# Patient Record
Sex: FEMALE | Race: WHITE | Employment: FULL TIME | URBAN - METROPOLITAN AREA
[De-identification: names, ages, dates, MRNs, and addresses within clinical notes are randomized per-mention and may not be internally consistent; named-entity substitution may affect disease eponyms.]

---

## 2022-03-11 ENCOUNTER — TRANSCRIBE ORDER (OUTPATIENT)
Dept: REGISTRATION | Age: 52
End: 2022-03-11

## 2022-03-11 ENCOUNTER — HOSPITAL ENCOUNTER (OUTPATIENT)
Dept: PREADMISSION TESTING | Age: 52
Discharge: HOME | End: 2022-03-11
Payer: COMMERCIAL

## 2022-03-11 ENCOUNTER — HOSPITAL ENCOUNTER (OUTPATIENT)
Dept: GENERAL RADIOLOGY | Age: 52
Discharge: HOME | End: 2022-03-11
Payer: COMMERCIAL

## 2022-03-11 DIAGNOSIS — Z01.812 BLOOD TESTS PRIOR TO TREATMENT OR PROCEDURE: Primary | ICD-10-CM

## 2022-03-11 DIAGNOSIS — Z01.812 BLOOD TESTS PRIOR TO TREATMENT OR PROCEDURE: ICD-10-CM

## 2022-03-11 DIAGNOSIS — M16.11 PRIMARY OSTEOARTHRITIS OF RIGHT HIP: ICD-10-CM

## 2022-03-11 DIAGNOSIS — M16.11 PRIMARY OSTEOARTHRITIS OF RIGHT HIP: Primary | ICD-10-CM

## 2022-03-11 LAB
ANION GAP SERPL CALC-SCNC: 4 MMOL/L (ref 3–18)
APPEARANCE UR: CLEAR
APTT PPP: 26.9 SEC (ref 23–36.4)
ATRIAL RATE: 63 BPM
BASOPHILS # BLD: 0.1 K/UL (ref 0–0.1)
BASOPHILS NFR BLD: 1 % (ref 0–2)
BILIRUB UR QL: NEGATIVE
BUN SERPL-MCNC: 16 MG/DL (ref 7–18)
BUN/CREAT SERPL: 21 (ref 12–20)
CALCIUM SERPL-MCNC: 9.4 MG/DL (ref 8.5–10.1)
CALCULATED P AXIS, ECG09: 78 DEGREES
CALCULATED R AXIS, ECG10: 61 DEGREES
CALCULATED T AXIS, ECG11: 60 DEGREES
CHLORIDE SERPL-SCNC: 105 MMOL/L (ref 100–111)
CO2 SERPL-SCNC: 30 MMOL/L (ref 21–32)
COLOR UR: YELLOW
CREAT SERPL-MCNC: 0.75 MG/DL (ref 0.6–1.3)
DIAGNOSIS, 93000: NORMAL
DIFFERENTIAL METHOD BLD: ABNORMAL
EOSINOPHIL # BLD: 0.3 K/UL (ref 0–0.4)
EOSINOPHIL NFR BLD: 3 % (ref 0–5)
ERYTHROCYTE [DISTWIDTH] IN BLOOD BY AUTOMATED COUNT: 12.7 % (ref 11.6–14.5)
GLUCOSE SERPL-MCNC: 94 MG/DL (ref 74–99)
GLUCOSE UR STRIP.AUTO-MCNC: NEGATIVE MG/DL
HBA1C MFR BLD: 6 % (ref 4.2–5.6)
HCT VFR BLD AUTO: 43.6 % (ref 35–45)
HGB BLD-MCNC: 14.5 G/DL (ref 12–16)
HGB UR QL STRIP: NEGATIVE
IMM GRANULOCYTES # BLD AUTO: 0 K/UL (ref 0–0.04)
IMM GRANULOCYTES NFR BLD AUTO: 0 % (ref 0–0.5)
INR PPP: 1.1 (ref 0.8–1.2)
KETONES UR QL STRIP.AUTO: NEGATIVE MG/DL
LEUKOCYTE ESTERASE UR QL STRIP.AUTO: NEGATIVE
LYMPHOCYTES # BLD: 3.2 K/UL (ref 0.9–3.6)
LYMPHOCYTES NFR BLD: 32 % (ref 21–52)
MCH RBC QN AUTO: 30 PG (ref 24–34)
MCHC RBC AUTO-ENTMCNC: 33.3 G/DL (ref 31–37)
MCV RBC AUTO: 90.1 FL (ref 78–100)
MONOCYTES # BLD: 0.7 K/UL (ref 0.05–1.2)
MONOCYTES NFR BLD: 7 % (ref 3–10)
NEUTS SEG # BLD: 5.8 K/UL (ref 1.8–8)
NEUTS SEG NFR BLD: 58 % (ref 40–73)
NITRITE UR QL STRIP.AUTO: NEGATIVE
NRBC # BLD: 0 K/UL (ref 0–0.01)
NRBC BLD-RTO: 0 PER 100 WBC
P-R INTERVAL, ECG05: 168 MS
PH UR STRIP: 5.5 [PH] (ref 5–8)
PLATELET # BLD AUTO: 341 K/UL (ref 135–420)
PMV BLD AUTO: 8.5 FL (ref 9.2–11.8)
POTASSIUM SERPL-SCNC: 4.3 MMOL/L (ref 3.5–5.5)
PROT UR STRIP-MCNC: NEGATIVE MG/DL
PROTHROMBIN TIME: 13.2 SEC (ref 11.5–15.2)
Q-T INTERVAL, ECG07: 368 MS
QRS DURATION, ECG06: 84 MS
QTC CALCULATION (BEZET), ECG08: 376 MS
RBC # BLD AUTO: 4.84 M/UL (ref 4.2–5.3)
SODIUM SERPL-SCNC: 139 MMOL/L (ref 136–145)
SP GR UR REFRACTOMETRY: 1.01 (ref 1–1.03)
UROBILINOGEN UR QL STRIP.AUTO: 0.2 EU/DL (ref 0.2–1)
VENTRICULAR RATE, ECG03: 63 BPM
WBC # BLD AUTO: 10 K/UL (ref 4.6–13.2)

## 2022-03-11 PROCEDURE — 87086 URINE CULTURE/COLONY COUNT: CPT

## 2022-03-11 PROCEDURE — 93005 ELECTROCARDIOGRAM TRACING: CPT

## 2022-03-11 PROCEDURE — 85025 COMPLETE CBC W/AUTO DIFF WBC: CPT

## 2022-03-11 PROCEDURE — 80048 BASIC METABOLIC PNL TOTAL CA: CPT

## 2022-03-11 PROCEDURE — 85730 THROMBOPLASTIN TIME PARTIAL: CPT

## 2022-03-11 PROCEDURE — 85610 PROTHROMBIN TIME: CPT

## 2022-03-11 PROCEDURE — 81003 URINALYSIS AUTO W/O SCOPE: CPT

## 2022-03-11 PROCEDURE — 83036 HEMOGLOBIN GLYCOSYLATED A1C: CPT

## 2022-03-11 PROCEDURE — 71046 X-RAY EXAM CHEST 2 VIEWS: CPT

## 2022-03-11 PROCEDURE — 36415 COLL VENOUS BLD VENIPUNCTURE: CPT

## 2022-03-12 LAB
BACTERIA SPEC CULT: NORMAL
CC UR VC: NORMAL
SERVICE CMNT-IMP: NORMAL
SERVICE CMNT-IMP: NORMAL

## 2022-03-18 ENCOUNTER — HOSPITAL ENCOUNTER (OUTPATIENT)
Dept: PREADMISSION TESTING | Age: 52
Discharge: HOME | End: 2022-03-18

## 2022-03-18 VITALS — HEIGHT: 64 IN | WEIGHT: 240 LBS | BODY MASS INDEX: 40.97 KG/M2

## 2022-03-18 RX ORDER — CEFAZOLIN SODIUM/WATER 2 G/20 ML
2 SYRINGE (ML) INTRAVENOUS ONCE
Status: CANCELLED | OUTPATIENT
Start: 2022-03-18 | End: 2022-03-18

## 2022-03-18 RX ORDER — OXYCODONE HYDROCHLORIDE 10 MG/1
10 TABLET ORAL
COMMUNITY
End: 2022-03-31

## 2022-03-18 RX ORDER — SODIUM CHLORIDE, SODIUM LACTATE, POTASSIUM CHLORIDE, CALCIUM CHLORIDE 600; 310; 30; 20 MG/100ML; MG/100ML; MG/100ML; MG/100ML
125 INJECTION, SOLUTION INTRAVENOUS CONTINUOUS
Status: CANCELLED | OUTPATIENT
Start: 2022-03-18

## 2022-03-18 RX ORDER — DOCUSATE SODIUM 100 MG/1
100 CAPSULE, LIQUID FILLED ORAL 2 TIMES DAILY
COMMUNITY
End: 2022-03-31

## 2022-03-18 RX ORDER — CHOLECALCIFEROL TAB 125 MCG (5000 UNIT) 125 MCG
5000 TAB ORAL DAILY
COMMUNITY

## 2022-03-18 RX ORDER — TRANEXAMIC ACID 10 MG/ML
1 INJECTION, SOLUTION INTRAVENOUS ONCE
Status: CANCELLED | OUTPATIENT
Start: 2022-03-18 | End: 2022-03-18

## 2022-03-18 RX ORDER — IBUPROFEN 200 MG
800 TABLET ORAL
COMMUNITY

## 2022-03-18 RX ORDER — METFORMIN HYDROCHLORIDE 500 MG/1
1500 TABLET ORAL 2 TIMES DAILY WITH MEALS
COMMUNITY
End: 2022-03-18

## 2022-03-18 RX ORDER — BUPROPION HYDROCHLORIDE 300 MG/1
300 TABLET ORAL DAILY
COMMUNITY
End: 2022-03-31

## 2022-03-18 RX ORDER — ROSUVASTATIN CALCIUM 20 MG/1
20 TABLET, COATED ORAL
COMMUNITY

## 2022-03-18 NOTE — PERIOP NOTES
PAT - SURGICAL PRE-ADMISSION INSTRUCTIONS    NAME:  Shannon Marcelo DATE:  3/18/2022    SURGERY DATE:  3/30/2022                                  SURGERY ARRIVAL TIME:   TBA    1. Do NOT eat or drink anything, including candy or gum, after MIDNIGHT on 3/30/2022 , unless you have specific instructions from your Surgeon or Anesthesia Provider to do so. 2. No smoking 24 hours before surgery. 3. No alcohol 24 hours prior to the day of surgery. 4. No recreational drugs for one week prior to the day of surgery. 5. Leave all valuables, including money/purse, at home. 6. Remove all jewelry, nail polish, makeup (including mascara); no lotions, powders, deodorant, or perfume/cologne/after shave. 7. Glasses/Contact lenses and Dentures may be worn to the hospital.  They will be removed prior to surgery. 8. Call your doctor if symptoms of a cold or illness develop within 24 ours prior to surgery. 9. AN ADULT MUST DRIVE YOU HOME AFTER OUTPATIENT SURGERY. 10. If you are having an OUTPATIENT procedure, please make arrangements for a responsible adult to be with you for 24 hours after your surgery. 11. If you are admitted to the hospital, you will be assigned to a bed after surgery is complete. Normally a family member will not be able to see you until you are in your assigned bed. 12. Visitation Restrictions Explained. Special Instructions:  Covid Test not needed  Vaccination card on media, Quarantine requirements discussed  No Advanced Directive or DNR. Patient to come to obtain xray right hip prior to surgery and bring CD to preop appointment  NONE.     Patient Prep:    use skin prep cloths with CHG     These surgical instructions were reviewed with patient during the PAT phone call

## 2022-03-22 ENCOUNTER — TRANSCRIBE ORDER (OUTPATIENT)
Dept: REGISTRATION | Age: 52
End: 2022-03-22

## 2022-03-22 ENCOUNTER — HOSPITAL ENCOUNTER (OUTPATIENT)
Dept: GENERAL RADIOLOGY | Age: 52
Discharge: HOME | End: 2022-03-22
Payer: COMMERCIAL

## 2022-03-22 DIAGNOSIS — Z01.818 OTHER SPECIFIED PRE-OPERATIVE EXAMINATION: Primary | ICD-10-CM

## 2022-03-22 DIAGNOSIS — Z01.818 OTHER SPECIFIED PRE-OPERATIVE EXAMINATION: ICD-10-CM

## 2022-03-22 PROCEDURE — 73502 X-RAY EXAM HIP UNI 2-3 VIEWS: CPT

## 2022-03-28 ENCOUNTER — HOSPITAL ENCOUNTER (OUTPATIENT)
Dept: PHYSICAL THERAPY | Age: 52
Discharge: HOME | End: 2022-03-28
Payer: COMMERCIAL

## 2022-03-28 PROCEDURE — 97530 THERAPEUTIC ACTIVITIES: CPT

## 2022-03-28 PROCEDURE — 97162 PT EVAL MOD COMPLEX 30 MIN: CPT

## 2022-03-28 PROCEDURE — 97116 GAIT TRAINING THERAPY: CPT

## 2022-03-28 NOTE — PROGRESS NOTES
[facility]  [address] [place], [address]  Ph [phone]  Fx [phone]    Plan of Care/ Statement of Necessity for Physical Therapy Services    Patient name: Teddy Arce Start of Care: 3/28/2022   Referral source: Toya Tafoya MD : 1970    Medical Diagnosis: Right hip pain [M25.551]   Onset Date:chronic, progressive    Treatment Diagnosis: right hip pain                                              ICD-10: M25.551   Prior Hospitalization: see medical history Provider#: [id number]   Medications: Verified on Patient summary List    Comorbidities: right knee meniscus debridement; depression, arthritis, hysterectomy x3 with last being complete hysterectomy    Prior Level of Function: functionally independent, uses [facility] for just shy of a year, active lifestyle      The Plan of Care and following information is based on the information from the initial evaluation. Assessment/ key information: 55 yo female who [facility] to [facility] with c/o right hip pain. Patient presents with right hip OA with no specific mechanism of injury - she reports chronic, progressive onset of symptoms. Patient reports  4/10 hip pain at best with laying on left side with bolster pillow between legs and right LE bent and 8/10 hip pain at worst with prolonged standing (<5mins), prolonged walking (<50yards). Pt reports they are unable to sleep through the night secondary to hip pain.  Patient demonstrates decreased ROM, decreased strength, impaired posture, impaired gait mechancis, pain and decreased functional mobility tolerance.   Patient will continue to benefit from skilled PT services to modify and progress therapeutic interventions, address functional mobility deficits, address ROM deficits, address strength deficits, analyze and address soft tissue restrictions, analyze and cue movement patterns, analyze and modify body mechanics/ergonomics, assess and modify postural abnormalities, address imbalance/dizziness and instruct in home and community integration to attain remaining goals. Evaluation Complexity History MEDIUM  Complexity : 1-2 comorbidities / personal factors will impact the outcome/ POC ; Examination MEDIUM Complexity : 3 Standardized tests and measures addressing body structure, function, activity limitation and / or participation in recreation  ;Presentation MEDIUM Complexity : Evolving with changing characteristics  ; Clinical Decision Making MEDIUM Complexity : FOTO score of 26-74 : FOTO score = an established functional score where 100 = no disability  Overall Complexity Rating: MEDIUM    Problem List: pain affecting function, decrease ROM, decrease strength, edema affecting function, impaired gait/ balance, decrease ADL/ functional abilitiies, decrease activity tolerance, decrease flexibility/ joint mobility and decrease transfer abilities   Treatment Plan may include any combination of the following: Therapeutic exercise, Therapeutic activities, Neuromuscular re-education, Physical agent/modality, Gait/balance training, Manual therapy, Patient education, Self Care training, Functional mobility training, Home safety training and Stair training  Vasopnuematic compression justification:  Per bilateral girth measures taken and listed above the edema is considered significant and having an impact on the patient's strength, balance, gait, transfers, self care and ADLs  Patient / Family readiness to learn indicated by: asking questions, trying to perform skills and interest  Persons(s) to be included in education: patient (P)  Barriers to Learning/Limitations: None  Measures taken if barriers to learning: NA  Patient Goal (s): I would like to be able to play tennis in my neighborhood and be able to get into and out of the bathtub without pain  Patient Self Reported Health Status: good  Rehabilitation Potential: good    Short Term Goals:  To be accomplished in 2 weeks: 1. Patient will be independent and compliant with HEP to progress toward goals and restore functional mobility. Eval Status: will issue HEP on first visit post op     2. Patient will improve FOTO score by 15 points to improve functional tolerance for exercise. Eval Status: FOTO 26  FOTO score = an established functional score where 100 = no disability     3. Patient will improve pain in right hip to 5/10 at worst to improve standing and ambulation tolerance and restore prior level of function. Eval Status: 8/10 at worst     4. Pt will have painfree right hip AROM WFL to aid in functional mechanics for ambulation/ADLs. Eval Status:   Hip Left Right   Flexion 90 deg 63 deg P! Extension 0 deg 0 deg   ABD 25 deg 5 deg P! ER 20 deg 5 deg P! IR 15 deg 0 deg P!         Long Term Goals: To be accomplished in 6 weeks:  1. Patient will improve FOTO score by 25 points to improve functional tolerance for walking for exercise and return to playing tennis. Eval Status: FOTO 26  FOTO score = an established functional score where 100 = no disability     2. Pt will ambulate with no AD, step through gait pattern with equal stance time and stride length bilaterally and no evidence of trendelenberg in order to establish normal gait mechanics for walking and eventual return to participation in tennis. Eval Status: step-to gait with significant reliance on UE support with AD, right trendelenberg with decreased stephanie, decreased stance time on right LE, stride length about 1/2 on right as compared to left LE; uses SPC in left UE primarily; amb     3. Pt will have 5/5 bilateral LE strength to return to goals of playing tennis and getting into/out of backtub without difficulty or pain.   Eval Status:   Hip Left (1-5) Right (1-5)   Hip Flexion 5 4   Hip Extension 5 4   Hip ABD 5 3+   Hip ADD 5 4   Hip ER 4+ 3+   Hip IR 4+ 3+      Knee Left (1-5) Right (1-5)   Knee Flexion 4+ 4   Knee Extension 4+ 4   Ankle PF 4+ 4+ Ankle DF 4+ 4+   Other             4. Patient will improve pain in right hip to 0/10 at worst to improve prolonged standing and ambulation tolerance and restore prior level of function. Eval Status: 8/10 at worst        Frequency / Duration: Patient to be seen 2 times per week for 6 weeks. Patient/ Caregiver education and instruction: Diagnosis, prognosis, self care, activity modification, brace/ splint application and exercises   [x]  Plan of care has been reviewed with PTA    Certification Period: THU Martinez, PT 3/28/2022 4:03 PM    ________________________________________________________________________    I certify that the above Therapy Services are being furnished while the patient is under my care. I agree with the treatment plan and certify that this therapy is necessary.     Physician's Signature:_____________________Date:____________TIME:________                                      Christina De La Cruz MD      ** Signature, Date and Time must be completed for valid certification **  Please sign and return to [facility]  [address], [address]  Ph [phone]  Fx [phone]

## 2022-03-28 NOTE — PROGRESS NOTES
PT DAILY TREATMENT NOTE/Hip/Knee/Ankle EVAL 10-18    Patient Name: Todd Calvert  Date:3/28/2022  : 1970  [x]  Patient  Verified  Payor: Gonzalo Lilly / Plan: Holden Leahy / Product Type: HMO /    In time:1445  Out time:1530  Total Treatment Time (min): 45  Visit #: 1 of 12    Medicare/BCBS Only   Total Timed Codes (min):  30 1:1 Treatment Time:  45       Treatment Area: Right hip pain [M25.551]      SUBJECTIVE  Pain Level (0-10 scale): 8/10  [x]constant []intermittent []improving []worsening []no change since onset    Any medication changes, allergies to medications, adverse drug reactions, diagnosis change, or new procedure performed?: [x] No    [] Yes (see summary sheet for update)  Subjective functional status/changes:     PLOF: functionally independent, uses [facility] for just shy of a year, active lifestyle  Limitations to PLOF: pain  Mechanism of Injury: no mechanism of injury - chronic progressive onset of arthritic changes  Current symptoms/Complaints: 4/10 at best with laying on left side with bolster pillow between legs and right LE bent; 8/10 at worst with prolonged standing (<5mins), prolonged walking (<50yards); pt reports they are unable to sleep through the night secondary to hip pain  Previous Treatment/Compliance: taking percocet, uses hot tub/heat occasionally, doing minimal stretching  PMHx/Surgical Hx: right knee meniscus debridement; depression, arthritis, hysterectomy x3 with last being complete hysterectomy   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  Living Situation: one story home, 3 KARI with HR - step to gait  Pt Goals: \"I would like to be able to play tennis in my neighborhood and be able to get into and out of the bathtub without pain\"  Barriers: [x]pain []financial []time []transportation []other  Motivation: good  Substance use: []Alcohol []Tobacco []other:   Cognition: A & O x 3        OBJECTIVE    15 min [x]Eval                  []Re-Eval     15 min Therapeutic Activity:  []  See flow sheet :   Rationale: increase ROM and increase strength  to improve the patients ability to complete transfers and ADLs without external assist      15 min Gait Trainin feet with SPC and with RW on level surfaces with supervision   Rationale:  Gait training with RW and fitting with RW and SPC          With   [x] TE   [x] TA   [] neuro   [] other: Patient Education: [] Review HEP    [] Progressed/Changed HEP based on:   [] positioning   [x] body mechanics   [x] transfers   [] heat/ice application    [x] other: gait       General Evaluation    Gait: step-to gait with significant reliance on UE support with AD, right trendelenberg with decreased stephanie, decreased stance time on right LE, stride length about 1/2 on right as compared to left LE; uses SPC in left UE primarily; amb  Stairs: step-to pattern with left LE leading with ascending stairs and right LE leading with descending - pt relies heavily on UE support on handrails  Posture: anterior pelvic tilt, no abnormal pelvic rotation  Palpation/Sensation: tenderness over right hip flexor/anterior hip    ROM:                                         AROM                            Hip Left Right   Flexion 90 deg 63 deg P! Extension 0 deg 0 deg   ABD 25 deg 5 deg P! ER 20 deg 5 deg P! IR 15 deg 0 deg P! Strength (MMT):                                            Hip Left (1-5) Right (1-5)   Hip Flexion 5 4   Hip Extension 5 4   Hip ABD 5 3+   Hip ADD 5 4   Hip ER 4+ 3+   Hip IR 4+ 3+     Knee Left (1-5) Right (1-5)   Knee Flexion 4+ 4   Knee Extension 4+ 4   Ankle PF 4+ 4+   Ankle DF 4+ 4+   Other       Special Tests:    Hip Left Right   Zhao Test + +   Obers + +   Scours - +       Other Tests / Comments:  Significant crepitus on right hip and in left knee with PROM       Pain Level (0-10 scale) post treatment: 8/10    ASSESSMENT/Changes in Function: 55 yo female who [facility] to [facility] with c/o right hip pain.  Patient presents with right hip OA with no specific mechanism of injury - she reports chronic, progressive onset of symptoms. Patient reports  4/10 hip pain at best with laying on left side with bolster pillow between legs and right LE bent and 8/10 hip pain at worst with prolonged standing (<5mins), prolonged walking (<50yards). Pt reports they are unable to sleep through the night secondary to hip pain. Patient demonstrates decreased ROM, decreased strength, impaired posture, impaired gait mechancis, pain and decreased functional mobility tolerance. Patient will continue to benefit from skilled PT services to modify and progress therapeutic interventions, address functional mobility deficits, address ROM deficits, address strength deficits, analyze and address soft tissue restrictions, analyze and cue movement patterns, analyze and modify body mechanics/ergonomics, assess and modify postural abnormalities, address imbalance/dizziness and instruct in home and community integration to attain remaining goals. [x]  See Plan of Care  []  See progress note/recertification  []  See Discharge Summary         Progress towards goals / Updated goals:  Short Term Goals: To be accomplished in 2 weeks:  1. Patient will be independent and compliant with HEP to progress toward goals and restore functional mobility. Eval Status: will issue HEP on first visit post op    2. Patient will improve FOTO score by 15 points to improve functional tolerance for exercise. Eval Status: FOTO 26  FOTO score = an established functional score where 100 = no disability    3. Patient will improve pain in right hip to 5/10 at worst to improve standing and ambulation tolerance and restore prior level of function. Eval Status: 8/10 at worst    4. Pt will have painfree right hip AROM WFL to aid in functional mechanics for ambulation/ADLs. Eval Status:   Hip Left Right   Flexion 90 deg 63 deg P! Extension 0 deg 0 deg   ABD 25 deg 5 deg P!    ER 20 deg 5 deg P! IR 15 deg 0 deg P! Long Term Goals: To be accomplished in 6 weeks:  1. Patient will improve FOTO score by 25 points to improve functional tolerance for walking for exercise and return to playing tennis. Eval Status: FOTO 26  FOTO score = an established functional score where 100 = no disability    2. Pt will ambulate with no AD, step through gait pattern with equal stance time and stride length bilaterally and no evidence of trendelenberg in order to establish normal gait mechanics for walking and eventual return to participation in tennis. Eval Status: step-to gait with significant reliance on UE support with AD, right trendelenberg with decreased stephanie, decreased stance time on right LE, stride length about 1/2 on right as compared to left LE; uses SPC in left UE primarily; amb    3. Pt will have 5/5 bilateral LE strength to return to goals of playing tennis and getting into/out of backtub without difficulty or pain. Eval Status:   Hip Left (1-5) Right (1-5)   Hip Flexion 5 4   Hip Extension 5 4   Hip ABD 5 3+   Hip ADD 5 4   Hip ER 4+ 3+   Hip IR 4+ 3+     Knee Left (1-5) Right (1-5)   Knee Flexion 4+ 4   Knee Extension 4+ 4   Ankle PF 4+ 4+   Ankle DF 4+ 4+   Other         4. Patient will improve pain in right hip to 0/10 at worst to improve prolonged standing and ambulation tolerance and restore prior level of function.   Eval Status: 8/10 at worst    PLAN  [x]  Upgrade activities as tolerated     [x]  Continue plan of care  [x]  Update interventions per flow sheet       []  Discharge due to:_  []  Other:_      Judi Skinner, PT 3/28/2022  2:55 PM

## 2022-03-29 ENCOUNTER — ANESTHESIA EVENT (OUTPATIENT)
Dept: SURGERY | Age: 52
End: 2022-03-29
Payer: COMMERCIAL

## 2022-03-30 ENCOUNTER — APPOINTMENT (OUTPATIENT)
Dept: GENERAL RADIOLOGY | Age: 52
End: 2022-03-30
Attending: ORTHOPAEDIC SURGERY
Payer: COMMERCIAL

## 2022-03-30 ENCOUNTER — HOSPITAL ENCOUNTER (OUTPATIENT)
Age: 52
Setting detail: OBSERVATION
Discharge: HOME | End: 2022-03-31
Attending: ORTHOPAEDIC SURGERY | Admitting: ORTHOPAEDIC SURGERY
Payer: COMMERCIAL

## 2022-03-30 ENCOUNTER — ANESTHESIA (OUTPATIENT)
Dept: SURGERY | Age: 52
End: 2022-03-30
Payer: COMMERCIAL

## 2022-03-30 PROBLEM — M16.11 UNILATERAL PRIMARY OSTEOARTHRITIS, RIGHT HIP: Status: ACTIVE | Noted: 2022-03-30

## 2022-03-30 LAB
ABO + RH BLD: NORMAL
ALBUMIN SERPL-MCNC: 3.2 G/DL (ref 3.4–5)
ALBUMIN/GLOB SERPL: 1 {RATIO} (ref 0.8–1.7)
ALP SERPL-CCNC: 43 U/L (ref 45–117)
ALT SERPL-CCNC: 27 U/L (ref 13–56)
ANION GAP SERPL CALC-SCNC: 3 MMOL/L (ref 3–18)
AST SERPL-CCNC: 23 U/L (ref 10–38)
BILIRUB SERPL-MCNC: 0.4 MG/DL (ref 0.2–1)
BLOOD GROUP ANTIBODIES SERPL: NORMAL
BUN SERPL-MCNC: 13 MG/DL (ref 7–18)
BUN/CREAT SERPL: 19 (ref 12–20)
CALCIUM SERPL-MCNC: 8.9 MG/DL (ref 8.5–10.1)
CHLORIDE SERPL-SCNC: 108 MMOL/L (ref 100–111)
CO2 SERPL-SCNC: 28 MMOL/L (ref 21–32)
CREAT SERPL-MCNC: 0.69 MG/DL (ref 0.6–1.3)
GLOBULIN SER CALC-MCNC: 3.2 G/DL (ref 2–4)
GLUCOSE BLD STRIP.AUTO-MCNC: 103 MG/DL (ref 70–110)
GLUCOSE BLD STRIP.AUTO-MCNC: 157 MG/DL (ref 70–110)
GLUCOSE SERPL-MCNC: 167 MG/DL (ref 74–99)
HCT VFR BLD AUTO: 37.9 % (ref 35–45)
HGB BLD-MCNC: 12.4 G/DL (ref 12–16)
POTASSIUM SERPL-SCNC: 4.1 MMOL/L (ref 3.5–5.5)
PROT SERPL-MCNC: 6.4 G/DL (ref 6.4–8.2)
SODIUM SERPL-SCNC: 139 MMOL/L (ref 136–145)
SPECIMEN EXP DATE BLD: NORMAL

## 2022-03-30 PROCEDURE — 77030013079 HC BLNKT BAIR HGGR 3M -A: Performed by: ANESTHESIOLOGY

## 2022-03-30 PROCEDURE — 77030020782 HC GWN BAIR PAWS FLX 3M -B: Performed by: ORTHOPAEDIC SURGERY

## 2022-03-30 PROCEDURE — 77030040241 HC ABD PLLW HIP MDII -B: Performed by: ORTHOPAEDIC SURGERY

## 2022-03-30 PROCEDURE — 77030038010: Performed by: ORTHOPAEDIC SURGERY

## 2022-03-30 PROCEDURE — 76010000131 HC OR TIME 2 TO 2.5 HR: Performed by: ORTHOPAEDIC SURGERY

## 2022-03-30 PROCEDURE — 74011250637 HC RX REV CODE- 250/637: Performed by: ORTHOPAEDIC SURGERY

## 2022-03-30 PROCEDURE — 74011000250 HC RX REV CODE- 250

## 2022-03-30 PROCEDURE — 74011250636 HC RX REV CODE- 250/636: Performed by: ORTHOPAEDIC SURGERY

## 2022-03-30 PROCEDURE — 76060000035 HC ANESTHESIA 2 TO 2.5 HR: Performed by: ORTHOPAEDIC SURGERY

## 2022-03-30 PROCEDURE — 74011250636 HC RX REV CODE- 250/636: Performed by: ANESTHESIOLOGY

## 2022-03-30 PROCEDURE — 77030018673: Performed by: ORTHOPAEDIC SURGERY

## 2022-03-30 PROCEDURE — 2709999900 HC NON-CHARGEABLE SUPPLY: Performed by: ORTHOPAEDIC SURGERY

## 2022-03-30 PROCEDURE — 73501 X-RAY EXAM HIP UNI 1 VIEW: CPT

## 2022-03-30 PROCEDURE — G0378 HOSPITAL OBSERVATION PER HR: HCPCS

## 2022-03-30 PROCEDURE — 74011250636 HC RX REV CODE- 250/636: Performed by: NURSE ANESTHETIST, CERTIFIED REGISTERED

## 2022-03-30 PROCEDURE — 77030027138 HC INCENT SPIROMETER -A: Performed by: ORTHOPAEDIC SURGERY

## 2022-03-30 PROCEDURE — 77030008477 HC STYL SATN SLP COVD -A: Performed by: ANESTHESIOLOGY

## 2022-03-30 PROCEDURE — 74011250637 HC RX REV CODE- 250/637: Performed by: ANESTHESIOLOGY

## 2022-03-30 PROCEDURE — 77030003029 HC SUT VCRL J&J -B: Performed by: ORTHOPAEDIC SURGERY

## 2022-03-30 PROCEDURE — C1776 JOINT DEVICE (IMPLANTABLE): HCPCS | Performed by: ORTHOPAEDIC SURGERY

## 2022-03-30 PROCEDURE — 76210000016 HC OR PH I REC 1 TO 1.5 HR: Performed by: ORTHOPAEDIC SURGERY

## 2022-03-30 PROCEDURE — 74011250636 HC RX REV CODE- 250/636

## 2022-03-30 PROCEDURE — 74011000250 HC RX REV CODE- 250: Performed by: ANESTHESIOLOGY

## 2022-03-30 PROCEDURE — 77030008683 HC TU ET CUF COVD -A: Performed by: ANESTHESIOLOGY

## 2022-03-30 PROCEDURE — 77030011264 HC ELECTRD BLD EXT COVD -A: Performed by: ORTHOPAEDIC SURGERY

## 2022-03-30 PROCEDURE — 77030031139 HC SUT VCRL2 J&J -A: Performed by: ORTHOPAEDIC SURGERY

## 2022-03-30 PROCEDURE — 77030013708 HC HNDPC SUC IRR PULS STRY –B: Performed by: ORTHOPAEDIC SURGERY

## 2022-03-30 PROCEDURE — 36415 COLL VENOUS BLD VENIPUNCTURE: CPT

## 2022-03-30 PROCEDURE — 86900 BLOOD TYPING SEROLOGIC ABO: CPT

## 2022-03-30 PROCEDURE — 77030018842 HC SOL IRR SOD CL 9% BAXT -A: Performed by: ORTHOPAEDIC SURGERY

## 2022-03-30 PROCEDURE — 82962 GLUCOSE BLOOD TEST: CPT

## 2022-03-30 PROCEDURE — 77030006643: Performed by: ANESTHESIOLOGY

## 2022-03-30 PROCEDURE — 74011000250 HC RX REV CODE- 250: Performed by: ORTHOPAEDIC SURGERY

## 2022-03-30 PROCEDURE — 85018 HEMOGLOBIN: CPT

## 2022-03-30 PROCEDURE — 77030003020 HC SUT TICRN COVD -A: Performed by: ORTHOPAEDIC SURGERY

## 2022-03-30 PROCEDURE — 80053 COMPREHEN METABOLIC PANEL: CPT

## 2022-03-30 PROCEDURE — 77030018074 HC RTVR SUT ARTH4 S&N -B: Performed by: ORTHOPAEDIC SURGERY

## 2022-03-30 PROCEDURE — 77030040361 HC SLV COMPR DVT MDII -B: Performed by: ORTHOPAEDIC SURGERY

## 2022-03-30 PROCEDURE — 77030020407 HC IV BLD WRMR ST 3M -A: Performed by: ANESTHESIOLOGY

## 2022-03-30 PROCEDURE — 77030008462 HC STPLR SKN PROX J&J -A: Performed by: ORTHOPAEDIC SURGERY

## 2022-03-30 PROCEDURE — 74011000250 HC RX REV CODE- 250: Performed by: NURSE ANESTHETIST, CERTIFIED REGISTERED

## 2022-03-30 DEVICE — 132 DEGREE NECK ANGLE HIP STEM
Type: IMPLANTABLE DEVICE | Site: HIP | Status: FUNCTIONAL
Brand: ACCOLADE

## 2022-03-30 DEVICE — LINER- CEMENTLESS
Type: IMPLANTABLE DEVICE | Site: HIP | Status: FUNCTIONAL
Brand: MDM

## 2022-03-30 DEVICE — TRIDENT II TRITANIUM CLUSTER 50D
Type: IMPLANTABLE DEVICE | Site: HIP | Status: FUNCTIONAL
Brand: TRIDENT II

## 2022-03-30 DEVICE — IMPLANTABLE DEVICE: Type: IMPLANTABLE DEVICE | Site: HIP | Status: FUNCTIONAL

## 2022-03-30 DEVICE — 6.5MM LOW PROFILE HEX SCREW 25MM
Type: IMPLANTABLE DEVICE | Site: HIP | Status: FUNCTIONAL
Brand: TRIDENT II

## 2022-03-30 DEVICE — X3 INSERT FOR MDM LINER
Type: IMPLANTABLE DEVICE | Site: HIP | Status: FUNCTIONAL
Brand: X3

## 2022-03-30 DEVICE — LFIT V40 FEMORAL HEAD
Type: IMPLANTABLE DEVICE | Site: HIP | Status: FUNCTIONAL
Brand: V40 HEAD

## 2022-03-30 RX ORDER — SODIUM CHLORIDE 0.9 % (FLUSH) 0.9 %
5-40 SYRINGE (ML) INJECTION AS NEEDED
Status: DISCONTINUED | OUTPATIENT
Start: 2022-03-30 | End: 2022-03-31 | Stop reason: HOSPADM

## 2022-03-30 RX ORDER — DOCUSATE SODIUM 100 MG/1
100 CAPSULE, LIQUID FILLED ORAL 2 TIMES DAILY
Status: DISCONTINUED | OUTPATIENT
Start: 2022-03-30 | End: 2022-03-31 | Stop reason: HOSPADM

## 2022-03-30 RX ORDER — SODIUM CHLORIDE, SODIUM LACTATE, POTASSIUM CHLORIDE, CALCIUM CHLORIDE 600; 310; 30; 20 MG/100ML; MG/100ML; MG/100ML; MG/100ML
1000 INJECTION, SOLUTION INTRAVENOUS CONTINUOUS
Status: DISCONTINUED | OUTPATIENT
Start: 2022-03-30 | End: 2022-03-30 | Stop reason: HOSPADM

## 2022-03-30 RX ORDER — SCOLOPAMINE TRANSDERMAL SYSTEM 1 MG/1
1 PATCH, EXTENDED RELEASE TRANSDERMAL ONCE
Status: DISCONTINUED | OUTPATIENT
Start: 2022-03-30 | End: 2022-03-31 | Stop reason: HOSPADM

## 2022-03-30 RX ORDER — PHENYLEPHRINE HCL IN 0.9% NACL 1 MG/10 ML
SYRINGE (ML) INTRAVENOUS AS NEEDED
Status: DISCONTINUED | OUTPATIENT
Start: 2022-03-30 | End: 2022-03-30 | Stop reason: HOSPADM

## 2022-03-30 RX ORDER — MAGNESIUM SULFATE 100 %
4 CRYSTALS MISCELLANEOUS AS NEEDED
Status: DISCONTINUED | OUTPATIENT
Start: 2022-03-30 | End: 2022-03-30 | Stop reason: HOSPADM

## 2022-03-30 RX ORDER — TRANEXAMIC ACID 10 MG/ML
INJECTION, SOLUTION INTRAVENOUS AS NEEDED
Status: DISCONTINUED | OUTPATIENT
Start: 2022-03-30 | End: 2022-03-30 | Stop reason: HOSPADM

## 2022-03-30 RX ORDER — SODIUM CHLORIDE 0.9 % (FLUSH) 0.9 %
5-40 SYRINGE (ML) INJECTION EVERY 8 HOURS
Status: DISCONTINUED | OUTPATIENT
Start: 2022-03-30 | End: 2022-03-30 | Stop reason: HOSPADM

## 2022-03-30 RX ORDER — ACETAMINOPHEN 500 MG
1000 TABLET ORAL ONCE
Status: COMPLETED | OUTPATIENT
Start: 2022-03-30 | End: 2022-03-30

## 2022-03-30 RX ORDER — INSULIN LISPRO 100 [IU]/ML
INJECTION, SOLUTION INTRAVENOUS; SUBCUTANEOUS ONCE
Status: DISCONTINUED | OUTPATIENT
Start: 2022-03-30 | End: 2022-03-30 | Stop reason: HOSPADM

## 2022-03-30 RX ORDER — FENTANYL CITRATE 50 UG/ML
INJECTION, SOLUTION INTRAMUSCULAR; INTRAVENOUS AS NEEDED
Status: DISCONTINUED | OUTPATIENT
Start: 2022-03-30 | End: 2022-03-30 | Stop reason: HOSPADM

## 2022-03-30 RX ORDER — OXYCODONE AND ACETAMINOPHEN 5; 325 MG/1; MG/1
2 TABLET ORAL
Status: DISCONTINUED | OUTPATIENT
Start: 2022-03-30 | End: 2022-03-31 | Stop reason: HOSPADM

## 2022-03-30 RX ORDER — DEXAMETHASONE SODIUM PHOSPHATE 4 MG/ML
INJECTION, SOLUTION INTRA-ARTICULAR; INTRALESIONAL; INTRAMUSCULAR; INTRAVENOUS; SOFT TISSUE AS NEEDED
Status: DISCONTINUED | OUTPATIENT
Start: 2022-03-30 | End: 2022-03-30 | Stop reason: HOSPADM

## 2022-03-30 RX ORDER — DEXTROSE MONOHYDRATE 100 MG/ML
0-250 INJECTION, SOLUTION INTRAVENOUS AS NEEDED
Status: DISCONTINUED | OUTPATIENT
Start: 2022-03-30 | End: 2022-03-30 | Stop reason: HOSPADM

## 2022-03-30 RX ORDER — BUPROPION HYDROCHLORIDE 150 MG/1
300 TABLET ORAL DAILY
Status: DISCONTINUED | OUTPATIENT
Start: 2022-03-31 | End: 2022-03-31 | Stop reason: HOSPADM

## 2022-03-30 RX ORDER — DIPHENHYDRAMINE HCL 25 MG
25 CAPSULE ORAL
Status: DISCONTINUED | OUTPATIENT
Start: 2022-03-30 | End: 2022-03-31 | Stop reason: HOSPADM

## 2022-03-30 RX ORDER — ROSUVASTATIN CALCIUM 10 MG/1
20 TABLET, COATED ORAL
Status: DISCONTINUED | OUTPATIENT
Start: 2022-03-30 | End: 2022-03-31 | Stop reason: HOSPADM

## 2022-03-30 RX ORDER — HYDROMORPHONE HYDROCHLORIDE 1 MG/ML
0.5 INJECTION, SOLUTION INTRAMUSCULAR; INTRAVENOUS; SUBCUTANEOUS
Status: DISCONTINUED | OUTPATIENT
Start: 2022-03-30 | End: 2022-03-30 | Stop reason: HOSPADM

## 2022-03-30 RX ORDER — GLYCOPYRROLATE 0.2 MG/ML
INJECTION INTRAMUSCULAR; INTRAVENOUS AS NEEDED
Status: DISCONTINUED | OUTPATIENT
Start: 2022-03-30 | End: 2022-03-30 | Stop reason: HOSPADM

## 2022-03-30 RX ORDER — OXYCODONE AND ACETAMINOPHEN 10; 325 MG/1; MG/1
2 TABLET ORAL
Status: DISCONTINUED | OUTPATIENT
Start: 2022-03-30 | End: 2022-03-31 | Stop reason: HOSPADM

## 2022-03-30 RX ORDER — CEFAZOLIN SODIUM/WATER 2 G/20 ML
2 SYRINGE (ML) INTRAVENOUS EVERY 8 HOURS
Status: COMPLETED | OUTPATIENT
Start: 2022-03-30 | End: 2022-03-31

## 2022-03-30 RX ORDER — HYDROMORPHONE HYDROCHLORIDE 1 MG/ML
1 INJECTION, SOLUTION INTRAMUSCULAR; INTRAVENOUS; SUBCUTANEOUS
Status: DISCONTINUED | OUTPATIENT
Start: 2022-03-30 | End: 2022-03-31 | Stop reason: HOSPADM

## 2022-03-30 RX ORDER — FENTANYL CITRATE 50 UG/ML
25 INJECTION, SOLUTION INTRAMUSCULAR; INTRAVENOUS AS NEEDED
Status: DISCONTINUED | OUTPATIENT
Start: 2022-03-30 | End: 2022-03-30 | Stop reason: HOSPADM

## 2022-03-30 RX ORDER — ONDANSETRON 2 MG/ML
INJECTION INTRAMUSCULAR; INTRAVENOUS AS NEEDED
Status: DISCONTINUED | OUTPATIENT
Start: 2022-03-30 | End: 2022-03-30 | Stop reason: HOSPADM

## 2022-03-30 RX ORDER — ASPIRIN 325 MG
325 TABLET, DELAYED RELEASE (ENTERIC COATED) ORAL 2 TIMES DAILY
Status: DISCONTINUED | OUTPATIENT
Start: 2022-03-30 | End: 2022-03-31 | Stop reason: HOSPADM

## 2022-03-30 RX ORDER — CEFAZOLIN SODIUM/WATER 2 G/20 ML
2 SYRINGE (ML) INTRAVENOUS ONCE
Status: COMPLETED | OUTPATIENT
Start: 2022-03-30 | End: 2022-03-30

## 2022-03-30 RX ORDER — NALOXONE HYDROCHLORIDE 0.4 MG/ML
0.4 INJECTION, SOLUTION INTRAMUSCULAR; INTRAVENOUS; SUBCUTANEOUS AS NEEDED
Status: DISCONTINUED | OUTPATIENT
Start: 2022-03-30 | End: 2022-03-31 | Stop reason: HOSPADM

## 2022-03-30 RX ORDER — HYDROMORPHONE HYDROCHLORIDE 2 MG/ML
INJECTION, SOLUTION INTRAMUSCULAR; INTRAVENOUS; SUBCUTANEOUS AS NEEDED
Status: DISCONTINUED | OUTPATIENT
Start: 2022-03-30 | End: 2022-03-30 | Stop reason: HOSPADM

## 2022-03-30 RX ORDER — INSULIN LISPRO 100 [IU]/ML
INJECTION, SOLUTION INTRAVENOUS; SUBCUTANEOUS
Status: DISCONTINUED | OUTPATIENT
Start: 2022-03-31 | End: 2022-03-31 | Stop reason: HOSPADM

## 2022-03-30 RX ORDER — ACETAMINOPHEN 325 MG/1
650 TABLET ORAL
Status: DISCONTINUED | OUTPATIENT
Start: 2022-03-30 | End: 2022-03-31 | Stop reason: HOSPADM

## 2022-03-30 RX ORDER — SODIUM CHLORIDE 0.9 % (FLUSH) 0.9 %
5-40 SYRINGE (ML) INJECTION EVERY 8 HOURS
Status: DISCONTINUED | OUTPATIENT
Start: 2022-03-30 | End: 2022-03-31 | Stop reason: HOSPADM

## 2022-03-30 RX ORDER — KETAMINE HYDROCHLORIDE 10 MG/ML
INJECTION, SOLUTION INTRAMUSCULAR; INTRAVENOUS AS NEEDED
Status: DISCONTINUED | OUTPATIENT
Start: 2022-03-30 | End: 2022-03-30 | Stop reason: HOSPADM

## 2022-03-30 RX ORDER — SODIUM CHLORIDE 0.9 % (FLUSH) 0.9 %
5-40 SYRINGE (ML) INJECTION AS NEEDED
Status: DISCONTINUED | OUTPATIENT
Start: 2022-03-30 | End: 2022-03-30 | Stop reason: HOSPADM

## 2022-03-30 RX ORDER — FLUMAZENIL 0.1 MG/ML
0.2 INJECTION INTRAVENOUS
Status: DISCONTINUED | OUTPATIENT
Start: 2022-03-30 | End: 2022-03-30 | Stop reason: HOSPADM

## 2022-03-30 RX ORDER — EPHEDRINE SULFATE/0.9% NACL/PF 50 MG/5 ML
SYRINGE (ML) INTRAVENOUS AS NEEDED
Status: DISCONTINUED | OUTPATIENT
Start: 2022-03-30 | End: 2022-03-30 | Stop reason: HOSPADM

## 2022-03-30 RX ORDER — CHOLECALCIFEROL TAB 125 MCG (5000 UNIT) 125 MCG
5000 TAB ORAL DAILY
Status: DISCONTINUED | OUTPATIENT
Start: 2022-03-31 | End: 2022-03-31 | Stop reason: HOSPADM

## 2022-03-30 RX ORDER — SODIUM CHLORIDE, SODIUM LACTATE, POTASSIUM CHLORIDE, CALCIUM CHLORIDE 600; 310; 30; 20 MG/100ML; MG/100ML; MG/100ML; MG/100ML
125 INJECTION, SOLUTION INTRAVENOUS CONTINUOUS
Status: DISCONTINUED | OUTPATIENT
Start: 2022-03-30 | End: 2022-03-31 | Stop reason: HOSPADM

## 2022-03-30 RX ORDER — PROPOFOL 10 MG/ML
INJECTION, EMULSION INTRAVENOUS AS NEEDED
Status: DISCONTINUED | OUTPATIENT
Start: 2022-03-30 | End: 2022-03-30 | Stop reason: HOSPADM

## 2022-03-30 RX ORDER — ONDANSETRON 2 MG/ML
4 INJECTION INTRAMUSCULAR; INTRAVENOUS
Status: DISCONTINUED | OUTPATIENT
Start: 2022-03-30 | End: 2022-03-31 | Stop reason: HOSPADM

## 2022-03-30 RX ORDER — LIDOCAINE HYDROCHLORIDE 20 MG/ML
INJECTION, SOLUTION EPIDURAL; INFILTRATION; INTRACAUDAL; PERINEURAL AS NEEDED
Status: DISCONTINUED | OUTPATIENT
Start: 2022-03-30 | End: 2022-03-30 | Stop reason: HOSPADM

## 2022-03-30 RX ORDER — MIDAZOLAM HYDROCHLORIDE 1 MG/ML
INJECTION, SOLUTION INTRAMUSCULAR; INTRAVENOUS AS NEEDED
Status: DISCONTINUED | OUTPATIENT
Start: 2022-03-30 | End: 2022-03-30 | Stop reason: HOSPADM

## 2022-03-30 RX ORDER — NALOXONE HYDROCHLORIDE 0.4 MG/ML
0.1 INJECTION, SOLUTION INTRAMUSCULAR; INTRAVENOUS; SUBCUTANEOUS AS NEEDED
Status: DISCONTINUED | OUTPATIENT
Start: 2022-03-30 | End: 2022-03-30 | Stop reason: HOSPADM

## 2022-03-30 RX ORDER — ROCURONIUM BROMIDE 10 MG/ML
INJECTION, SOLUTION INTRAVENOUS AS NEEDED
Status: DISCONTINUED | OUTPATIENT
Start: 2022-03-30 | End: 2022-03-30 | Stop reason: HOSPADM

## 2022-03-30 RX ORDER — VANCOMYCIN HYDROCHLORIDE 1 G/20ML
INJECTION, POWDER, LYOPHILIZED, FOR SOLUTION INTRAVENOUS AS NEEDED
Status: DISCONTINUED | OUTPATIENT
Start: 2022-03-30 | End: 2022-03-30 | Stop reason: HOSPADM

## 2022-03-30 RX ADMIN — OXYCODONE AND ACETAMINOPHEN 2 TABLET: 10; 325 TABLET ORAL at 20:32

## 2022-03-30 RX ADMIN — FENTANYL CITRATE 25 MCG: 50 INJECTION, SOLUTION INTRAMUSCULAR; INTRAVENOUS at 17:56

## 2022-03-30 RX ADMIN — Medication 2 G: at 15:22

## 2022-03-30 RX ADMIN — SUGAMMADEX 200 MG: 100 INJECTION, SOLUTION INTRAVENOUS at 17:25

## 2022-03-30 RX ADMIN — SODIUM CHLORIDE, SODIUM LACTATE, POTASSIUM CHLORIDE, AND CALCIUM CHLORIDE: 600; 310; 30; 20 INJECTION, SOLUTION INTRAVENOUS at 15:45

## 2022-03-30 RX ADMIN — MIDAZOLAM 2 MG: 1 INJECTION INTRAMUSCULAR; INTRAVENOUS at 15:07

## 2022-03-30 RX ADMIN — ROCURONIUM BROMIDE 10 MG: 10 INJECTION, SOLUTION INTRAVENOUS at 15:14

## 2022-03-30 RX ADMIN — SODIUM CHLORIDE, SODIUM LACTATE, POTASSIUM CHLORIDE, AND CALCIUM CHLORIDE 1000 ML: 600; 310; 30; 20 INJECTION, SOLUTION INTRAVENOUS at 11:10

## 2022-03-30 RX ADMIN — ROCURONIUM BROMIDE 20 MG: 10 INJECTION, SOLUTION INTRAVENOUS at 16:01

## 2022-03-30 RX ADMIN — Medication 10 MG: at 16:36

## 2022-03-30 RX ADMIN — ONDANSETRON 4 MG: 2 INJECTION INTRAMUSCULAR; INTRAVENOUS at 20:31

## 2022-03-30 RX ADMIN — Medication 100 MCG: at 15:31

## 2022-03-30 RX ADMIN — HYDROMORPHONE HYDROCHLORIDE 0.5 MG: 1 INJECTION, SOLUTION INTRAMUSCULAR; INTRAVENOUS; SUBCUTANEOUS at 18:25

## 2022-03-30 RX ADMIN — KETAMINE HYDROCHLORIDE 30 MG: 10 INJECTION, SOLUTION INTRAMUSCULAR; INTRAVENOUS at 15:36

## 2022-03-30 RX ADMIN — CEFAZOLIN 2 G: 10 INJECTION, POWDER, FOR SOLUTION INTRAVENOUS at 22:34

## 2022-03-30 RX ADMIN — HYDROMORPHONE HYDROCHLORIDE 1 MG: 1 INJECTION, SOLUTION INTRAMUSCULAR; INTRAVENOUS; SUBCUTANEOUS at 21:41

## 2022-03-30 RX ADMIN — ROCURONIUM BROMIDE 40 MG: 10 INJECTION, SOLUTION INTRAVENOUS at 15:15

## 2022-03-30 RX ADMIN — Medication 100 MCG: at 17:08

## 2022-03-30 RX ADMIN — SODIUM CHLORIDE, SODIUM LACTATE, POTASSIUM CHLORIDE, AND CALCIUM CHLORIDE 125 ML/HR: 600; 310; 30; 20 INJECTION, SOLUTION INTRAVENOUS at 18:28

## 2022-03-30 RX ADMIN — FENTANYL CITRATE 100 MCG: 50 INJECTION, SOLUTION INTRAMUSCULAR; INTRAVENOUS at 15:12

## 2022-03-30 RX ADMIN — ROSUVASTATIN CALCIUM 20 MG: 10 TABLET, COATED ORAL at 21:41

## 2022-03-30 RX ADMIN — HYDROMORPHONE HYDROCHLORIDE 0.5 MG: 1 INJECTION, SOLUTION INTRAMUSCULAR; INTRAVENOUS; SUBCUTANEOUS at 18:34

## 2022-03-30 RX ADMIN — DEXAMETHASONE SODIUM PHOSPHATE 8 MG: 4 INJECTION, SOLUTION INTRAMUSCULAR; INTRAVENOUS at 15:44

## 2022-03-30 RX ADMIN — ONDANSETRON HYDROCHLORIDE 4 MG: 2 INJECTION INTRAMUSCULAR; INTRAVENOUS at 17:18

## 2022-03-30 RX ADMIN — ACETAMINOPHEN 1000 MG: 500 TABLET ORAL at 15:04

## 2022-03-30 RX ADMIN — GLYCOPYRROLATE 0.2 MG: 0.2 INJECTION INTRAMUSCULAR; INTRAVENOUS at 15:07

## 2022-03-30 RX ADMIN — KETAMINE HYDROCHLORIDE 20 MG: 10 INJECTION, SOLUTION INTRAMUSCULAR; INTRAVENOUS at 15:14

## 2022-03-30 RX ADMIN — FENTANYL CITRATE 25 MCG: 50 INJECTION, SOLUTION INTRAMUSCULAR; INTRAVENOUS at 18:19

## 2022-03-30 RX ADMIN — SODIUM CHLORIDE, PRESERVATIVE FREE 10 ML: 5 INJECTION INTRAVENOUS at 21:41

## 2022-03-30 RX ADMIN — PROPOFOL 50 MG: 10 INJECTION, EMULSION INTRAVENOUS at 17:23

## 2022-03-30 RX ADMIN — Medication 10 MG: at 16:07

## 2022-03-30 RX ADMIN — SODIUM CHLORIDE, SODIUM LACTATE, POTASSIUM CHLORIDE, AND CALCIUM CHLORIDE 125 ML/HR: 600; 310; 30; 20 INJECTION, SOLUTION INTRAVENOUS at 11:10

## 2022-03-30 RX ADMIN — PROPOFOL 200 MG: 10 INJECTION, EMULSION INTRAVENOUS at 15:15

## 2022-03-30 RX ADMIN — LIDOCAINE HYDROCHLORIDE 80 MG: 20 INJECTION, SOLUTION INTRAVENOUS at 15:15

## 2022-03-30 RX ADMIN — FENTANYL CITRATE 25 MCG: 50 INJECTION, SOLUTION INTRAMUSCULAR; INTRAVENOUS at 18:08

## 2022-03-30 RX ADMIN — DOCUSATE SODIUM 100 MG: 100 CAPSULE ORAL at 20:31

## 2022-03-30 RX ADMIN — HYDROMORPHONE HYDROCHLORIDE 1 MG: 2 INJECTION, SOLUTION INTRAMUSCULAR; INTRAVENOUS; SUBCUTANEOUS at 15:39

## 2022-03-30 RX ADMIN — ASPIRIN 325 MG: 325 TABLET, COATED ORAL at 20:31

## 2022-03-30 RX ADMIN — TRANEXAMIC ACID 1 G: 10 INJECTION, SOLUTION INTRAVENOUS at 15:27

## 2022-03-30 RX ADMIN — HYDROMORPHONE HYDROCHLORIDE 0.5 MG: 1 INJECTION, SOLUTION INTRAMUSCULAR; INTRAVENOUS; SUBCUTANEOUS at 18:45

## 2022-03-30 NOTE — ROUTINE PROCESS
TRANSFER - IN REPORT:    Verbal report received from Erick Favre RN (name) on Liz Montiel  being received from PACU (unit) for routine post - op      Report consisted of patients Situation, Background, Assessment and   Recommendations(SBAR). Information from the following report(s) SBAR, Kardex, OR Summary, Procedure Summary, Intake/Output, MAR, Recent Results and Quality Measures was reviewed with the receiving nurse. Opportunity for questions and clarification was provided. Assessment completed upon patients arrival to unit and care assumed.

## 2022-03-30 NOTE — OP NOTES
Operative Report    Patient: Angelica Craig  MRN: [id number]  SSN: @WCY@    YOB: 1970   Age: 54 y.o. Sex: female        Indications: Severe osteoarthritis right hip with symptoms limiting function. Date of Surgery: 3/30/2022      Preoperative Diagnosis:  SEVERE DEBILITATING RIGHT HIP PAIN    Postoperative Diagnosis: SEVERE DEBILITATING RIGHT HIP PAIN    Surgeon(s) and Role:     Felisa Parkinson MD - Primary      Anesthesia:  General    Procedures: Procedure(s):  RIGHT TOTAL HIP ARTHROPLASTY W/C-ARM AND PORTABLE X-RAY MACHINE    Procedure in Detail:  The patient was brought to the operating room and placed on the operating table in a supine position. Following the successful induction of anesthesia the patient was placed in the right lateral decubitus position. The right hip was scrubbed, prepped, and draped in the usual sterile fashion. A standard posterolateral style hip incision was carried down to the deep fascia. The short rotators were elevated with the capsule in a single sleeve and the hip was totally dislocated. Findings included severe end-stage osteoarthritic changes to the femoral head, acetabulum with fraying of the labrum. The femoral neck was osteotimized 1 cm above the trochanter in an oblique fashion, and marginal osteophytes on the femoral neck were removed with the aid of a rongeur and osteotome. Attention was then turned to the acetabulum, which was peripherally freed of soft tissue and then reamed up to 50 mm diameter. No cystic changes were noted within the cleaned acetabular subchondral bone bed. A 50 mm MDM cup was then introduced in 40 degrees of abduction and 15 degrees of anteversion. This was firmly impacted and the 38 mm MDM liner was inserted. Attention was then turned to the femur. The femur was opened with an awl, and then broached distally to a size 3 Accolade 2 stem.   Trial reduction of the 22.2 mm inner/ 38 mm outer  head with a +0 neck length was undertaken. The hip was stable in all planes and leg lengths were equal.  The patient's components were in good position by fluoroscopy. With this stability, the trial components were removed and the size 3 implantable stem was impacted into position. A 38 mm Restoration MDM X3 insert over a 22.2 mm metal inner head was impacted onto the trunion. The hip was reduced and taken through a range of motion and found to be stable. The wound was copiously irrigated, closed in layers. The posterior capsule was repaired with #2 Fiberwire through drill holes in the proximal femur. The fascia socrates was closed with #1 vicryl figure of eight sutures and the dermal layer with 2-0 vicryl buried interrupted sutures. The skin was reapposed with staples. A compression dressing was applied. Instrument, sponge, and needle counts were correct prior to wound closure and at the conclusion of the case. Findings: severe right hip degenerative joint disease    Estimated Blood Loss:  100 cc    Specimens: None    Implant:   Implant Name Type Inv.  Item Serial No.  Lot No. LRB No. Used Action   SHELL ACET CLUS H 50D TRTANIUM -- TRIDENT II - [id number]  SHELL ACET CLUS H 50D TRTANIUM -- TRIDENT II  [employer] [id number] Right 1 Implanted   SCREW ACET HEX LOW PROFILE 6.5X25MM TRIDENT II - [id number]  SCREW ACET HEX LOW PROFILE 6.5X25MM TRIDENT II  [employer] [facility]_ [id number] Right 1 Implanted   LINER MDM COCR 38MM D --  - [id number]  LINER MDM COCR 38MM D --   [employer]_ [id number] Right 1 Implanted   STEM FEM SZ 3 132D [id number] -- ACCOLADE II V40 - [id number]  STEM FEM SZ 3 132D [id number] -- ACCOLADE II V40  [employer] [id number] Right 1 Implanted   INSERT MDM X3 22.2MM 38D -- RESTORATION - [id number]  INSERT MDM X3 22.2MM 38D -- RESTORATION  [employer] [id number] Right 1 Implanted   HEAD FEM LFIT V40 22.2MM STD --  - [id number]  HEAD FEM LFIT V40 22.2MM STD -- [employer] [facility]_ [id number] Right 1 Implanted         Closure: Primary    Complications: None

## 2022-03-30 NOTE — ANESTHESIA PREPROCEDURE EVALUATION
Relevant Problems   No relevant active problems       Anesthetic History     PONV          Review of Systems / Medical History  Patient summary reviewed, nursing notes reviewed and pertinent labs reviewed    Pulmonary        Sleep apnea: CPAP           Neuro/Psych         Psychiatric history     Cardiovascular                  Exercise tolerance: >4 METS     GI/Hepatic/Renal  Within defined limits           Pertinent negatives: No GERD, liver disease and renal disease   Endo/Other    Diabetes    Arthritis     Other Findings            Physical Exam    Airway  Mallampati: III  TM Distance: < 4 cm  Neck ROM: normal range of motion   Mouth opening: Normal     Cardiovascular               Dental  No notable dental hx       Pulmonary                 Abdominal  GI exam deferred       Other Findings            Anesthetic Plan    ASA: 3  Anesthesia type: general          Induction: Intravenous  Anesthetic plan and risks discussed with: Patient

## 2022-03-30 NOTE — ANESTHESIA POSTPROCEDURE EVALUATION
Procedure(s):  RIGHT HIP ARTHROPLASTY W/C-ARM. general    Anesthesia Post Evaluation      Multimodal analgesia: multimodal analgesia used between 6 hours prior to anesthesia start to PACU discharge  Patient location during evaluation: PACU  Patient participation: complete - patient participated  Level of consciousness: awake and alert  Pain score: 4  Airway patency: patent  Anesthetic complications: no  Cardiovascular status: acceptable  Respiratory status: acceptable  Hydration status: acceptable  Post anesthesia nausea and vomiting:  none  Final Post Anesthesia Temperature Assessment:  Normothermia (36.0-37.5 degrees C)      INITIAL Post-op Vital signs:   Vitals Value Taken Time   /61 03/30/22 1900   Temp 36.6 °C (97.8 °F) 03/30/22 1805   Pulse 72 03/30/22 1903   Resp 17 03/30/22 1903   SpO2 98 % 03/30/22 1903   Vitals shown include unvalidated device data.

## 2022-03-30 NOTE — H&P
Orthopedic Generic Pre-Op History and Physical    Subjective:     Patient is a 54 y.o.  female presented with a history of right hip AVN. Onset of symptoms was several years ago with gradually worsening course since that time. She is being admitted for R BRITTAYN. . The indications for the procedure include severe pain, mobility impairment, and decreased quality of life. There are no problems to display for this patient. Past Medical History:   Diagnosis Date    Arthritis     Chronic pain     Diabetes ([address]     Nausea & vomiting     Psychiatric disorder     Sleep apnea     cpap      Past Surgical History:   Procedure Laterality Date    HX HYSTERECTOMY  2008    HX ORTHOPAEDIC  2003    right knee torm menicus      Prior to Admission medications    Medication Sig Start Date End Date Taking? Authorizing Provider   buPROPion XL (Wellbutrin XL) 300 mg XL tablet Take 300 mg by mouth daily. Yes Provider, Historical   rosuvastatin (Crestor) 20 mg tablet Take 20 mg by mouth nightly. Yes Provider, Historical   docusate sodium (Colace) 100 mg capsule Take 100 mg by mouth two (2) times a day. Yes Provider, Historical   oxyCODONE IR (ROXICODONE) 10 mg tab immediate release tablet Take 10 mg by mouth. Yes Provider, Historical   cholecalciferol (VITAMIN D3) (5000 Units/125 mcg) tab tablet Take 5,000 Units by mouth daily. Provider, Historical   ibuprofen (Motrin IB) 200 mg tablet Take 800 mg by mouth every eight (8) hours as needed for Pain. Provider, Historical     Allergies   Allergen Reactions    [place] Itching    Metformin Nausea and Vomiting    Sulfa (Sulfonamide Antibiotics) Rash      Social History     Tobacco Use    Smoking status: Never Smoker    Smokeless tobacco: Never Used   Substance Use Topics    Alcohol use: Yes     Comment: social      History reviewed. No pertinent family history.       Review of Systems    Objective:     Patient Vitals for the past 8 hrs:   BP Temp Pulse Resp SpO2 Height Weight   03/30/22 1022 (!) 145/91 98.3 °F (36.8 °C) 84 18 98 % 5' 4\" (1.626 m) 107.1 kg (236 lb 1.6 oz)       Physical Exam    Imaging Review  Hip xrays were reviewed at patient's preop appt. Assessment:     Active Problems:    * No active hospital problems. *      Plan:     The various methods of treatment have been discussed with the patient and family. After consideration of risks, benefits and other options for treatment, the patient has consented to surgical interventions. Questions were answered and Pre-op teaching was done by preop nurse.

## 2022-03-31 VITALS
RESPIRATION RATE: 16 BRPM | HEART RATE: 84 BPM | HEIGHT: 64 IN | SYSTOLIC BLOOD PRESSURE: 119 MMHG | WEIGHT: 236.1 LBS | OXYGEN SATURATION: 96 % | TEMPERATURE: 98.8 F | DIASTOLIC BLOOD PRESSURE: 55 MMHG | BODY MASS INDEX: 40.31 KG/M2

## 2022-03-31 PROBLEM — M16.11 UNILATERAL PRIMARY OSTEOARTHRITIS, RIGHT HIP: Status: RESOLVED | Noted: 2022-03-30 | Resolved: 2022-03-31

## 2022-03-31 LAB — GLUCOSE BLD STRIP.AUTO-MCNC: 153 MG/DL (ref 70–110)

## 2022-03-31 PROCEDURE — G0378 HOSPITAL OBSERVATION PER HR: HCPCS

## 2022-03-31 PROCEDURE — 97530 THERAPEUTIC ACTIVITIES: CPT

## 2022-03-31 PROCEDURE — 97535 SELF CARE MNGMENT TRAINING: CPT

## 2022-03-31 PROCEDURE — 74011250636 HC RX REV CODE- 250/636: Performed by: ORTHOPAEDIC SURGERY

## 2022-03-31 PROCEDURE — 97116 GAIT TRAINING THERAPY: CPT

## 2022-03-31 PROCEDURE — 74011000250 HC RX REV CODE- 250: Performed by: ORTHOPAEDIC SURGERY

## 2022-03-31 PROCEDURE — 97166 OT EVAL MOD COMPLEX 45 MIN: CPT

## 2022-03-31 PROCEDURE — 74011636637 HC RX REV CODE- 636/637: Performed by: ORTHOPAEDIC SURGERY

## 2022-03-31 PROCEDURE — 74011250637 HC RX REV CODE- 250/637: Performed by: ORTHOPAEDIC SURGERY

## 2022-03-31 PROCEDURE — 97161 PT EVAL LOW COMPLEX 20 MIN: CPT

## 2022-03-31 PROCEDURE — 77030027138 HC INCENT SPIROMETER -A

## 2022-03-31 PROCEDURE — 82962 GLUCOSE BLOOD TEST: CPT

## 2022-03-31 RX ORDER — BUPROPION HYDROCHLORIDE 300 MG/1
300 TABLET ORAL DAILY
Qty: 30 TABLET | Refills: 0 | Status: SHIPPED | OUTPATIENT
Start: 2022-04-01

## 2022-03-31 RX ORDER — DOCUSATE SODIUM 100 MG/1
100 CAPSULE, LIQUID FILLED ORAL 2 TIMES DAILY
Qty: 60 CAPSULE | Refills: 2 | Status: SHIPPED | OUTPATIENT
Start: 2022-03-31 | End: 2022-06-29

## 2022-03-31 RX ADMIN — CEFAZOLIN 2 G: 10 INJECTION, POWDER, FOR SOLUTION INTRAVENOUS at 06:15

## 2022-03-31 RX ADMIN — ONDANSETRON 4 MG: 2 INJECTION INTRAMUSCULAR; INTRAVENOUS at 08:49

## 2022-03-31 RX ADMIN — Medication 2 UNITS: at 12:06

## 2022-03-31 RX ADMIN — ASPIRIN 325 MG: 325 TABLET, COATED ORAL at 08:54

## 2022-03-31 RX ADMIN — ONDANSETRON 4 MG: 2 INJECTION INTRAMUSCULAR; INTRAVENOUS at 04:16

## 2022-03-31 RX ADMIN — DOCUSATE SODIUM 100 MG: 100 CAPSULE ORAL at 08:53

## 2022-03-31 RX ADMIN — BUPROPION HYDROCHLORIDE 300 MG: 150 TABLET, EXTENDED RELEASE ORAL at 08:54

## 2022-03-31 RX ADMIN — HYDROMORPHONE HYDROCHLORIDE 1 MG: 1 INJECTION, SOLUTION INTRAMUSCULAR; INTRAVENOUS; SUBCUTANEOUS at 04:29

## 2022-03-31 RX ADMIN — ONDANSETRON 4 MG: 2 INJECTION INTRAMUSCULAR; INTRAVENOUS at 00:21

## 2022-03-31 NOTE — ROUTINE PROCESS
1420-This writer has reviewed discharge instructions with patient at this time. Patient has verbalized understanding. Patient was provided with care notes to include side effects of RX's. Arm bands removed and shredded. AVS reviewed with Harriet Pearson.

## 2022-03-31 NOTE — PROGRESS NOTES
Transition of Care (SHARYN) Plan:          Pt admitted for an elective Right  Hip surgical procedure. Pt is independent and has family support, pt does have a home rolling walker, pt prearranged with [facility] outpatient services. Please encourage ambulation. No transition of care needs identified at this time. Anticipate pt will be medically stable for discharge within the next 24-48 hours with physician follow up. CM available to assist as needed. SHARYN Transportation:   How is patient being transported at discharge? Family/Friend      When? Once cleared by physician     Is transport scheduled? N/A      Follow-up appointment and transportation:   PCP/Specialist?  See AVS for Appointment         Who is transporting to the follow-up appointment? Self/Family/Friend      Is transport for follow up appointment scheduled? N/A    Communication plan (with patient/family): Who is being called? Patient or Next of Kin? Responsible party? Patient      What number(s) is to be used? See Facesheet      What service provider is calling for [facility] services? When are they calling? Readmission Risk? (Green/Low; Yellow/Moderate; Red/High):  Barber[employer]Pltrish here to complete [employer] including selection of the Healthcare Decision Maker Relationship (ie \"Primary\")  Care Management Interventions  PCP Verified by CM: Yes  Palliative Care Criteria Met (RRAT>21 & CHF Dx)?: No  Mode of Transport at Discharge: Other (see comment) (mother)  Transition of Care Consult (CM Consult):  Other (outpatient physical therapy)  Physical Therapy Consult: Yes  Occupational Therapy Consult: Yes  Support Systems: Parent(s)  Confirm Follow Up Transport: Family  The Plan for Transition of Care is Related to the Following Treatment Goals : return home with outpatient PT  Discharge Location  Patient Expects to be Discharged to[de-identified] Home with outpatient services

## 2022-03-31 NOTE — PROGRESS NOTES
Problem: Mobility Impaired (Adult and Pediatric)  Goal: *Acute Goals and Plan of Care (Insert Text)  Description: Goals to be addressed in 1-3 days:  1. Supine to sit and sit to supine SBA with HR for meals. 2. Sit to stand and stand to sit SBA/CGA with RW in prep for ambulation. 3. Ambulate 100ft SBA/CGA with RW, WBAT, for home/community mobility. 4. Ascend/descend a 3 stair steps CGA/Ramesh with HR for home entry. Outcome: Progressing Towards Goal  Note: [x]  Patient has met MD cl mitchell for d/c home  [x]  Recommend HH with 24 hour adult care   []  Benefit from additional acute PT session to address:      PHYSICAL THERAPY EVALUATION    Patient: Spenser Torres (9 y.o. female)  Date: 3/31/2022  Primary Diagnosis: Unilateral primary osteoarthritis, right hip [M16.11]  Procedure(s) (LRB):  RIGHT HIP ARTHROPLASTY W/C-ARM (Right) 1 Day Post-Op   Precautions:  Fall,WBAT (posterior hip precautions)    ASSESSMENT :  Based on the objective data described below, the patient presents with lower extremity weakness, decreased gait quality and endurance, impaired bed mobility and transfers, and overall limitations in functional mobility s/p R THR. Pt performed supine to sit with CGA and extra time, sit to stand with CGA. Patient ambulated 100 feet with RW, GB applied, CGA. Pt negotiated 3 stairs with handrails and CGA. Pt educated on icing, elevation, positioning, HEP, home safety, posterior hip precautions, weight bearing as tolerated and activity recommendations. Home health physical therapy is recommended upon discharge from hospital.    Patient will benefit from skilled intervention to address the above impairments.   Patient's rehabilitation potential is considered to be Good  Factors which may influence rehabilitation potential include:   []         None noted  []         Mental ability/status  []         Medical condition  []         Home/family situation and support systems  []         Safety awareness  []         Pain tolerance/management  []         Other:      PLAN :  Recommendations and Planned Interventions:   [x]           Bed Mobility Training             []    Neuromuscular Re-Education  [x]           Transfer Training                   []    Orthotic/Prosthetic Training  [x]           Gait Training                          [x]    Modalities  [x]           Therapeutic Exercises           [x]    Edema Management/Control  [x]           Therapeutic Activities            [x]    Family Training/Education  [x]           Patient Education  []           Other (comment):    Frequency/Duration: Patient will be followed by physical therapy twice daily until patient is discharged from hospital.  Discharge Recommendations: Outpatient  Further Equipment Recommendations for Discharge: N/A     SUBJECTIVE:   Patient stated I am doing ok.     OBJECTIVE DATA SUMMARY:     Past Medical History:   Diagnosis Date    Arthritis     Chronic pain     Di[address] [address]     Nausea & vomiting     Psychiatric disorder     Sleep apnea     cpap     Past Surgical History:   Procedure Laterality Date    HX HYSTERECTOMY  2008    HX ORTHOPAEDIC  2003    right knee torm menicus     Barriers to Learning/Limitations: None  Compensate with: Visual Cues, Verbal Cues, and Tactile Cues  PLOF: Independent amb without AD  Home Situation:  Home Situation  Home Environment: Private residence  # Steps to Enter: 3  Rails to Enter: Yes  Hand Rails : Bilateral  One/Two Story Residence: One story  Living Alone: No  Support Systems: Parent(s),Child(criss)  Patient Expects to be Discharged to[de-identified] Home  Current DME Used/Available at Home: Walker, rolling  Tub or Shower Type: Tub/Shower combination  Critical Behavior:  Neurologic State: Alert  Orientation Level: Oriented X4  Cognition: Follows commands; Appropriate for age attention/concentration  Safety/Judgement: Awareness of environment; Insight into deficits  Psychosocial  Patient Behaviors: Calm;Cooperative  Purposeful Interaction: Yes  Pt Identified Daily Priority: Clinical issues (comment)  Skin Condition/Temp: Dry;Warm   Skin Integrity: Incision (comment) ( right hip)  Skin Integumentary  Skin Color: Appropriate for ethnicity  Skin Condition/Temp: Dry;Warm  Skin Integrity: Incision (comment) ( right hip)  Turgor: Non-tenting   Strength:    Strength: Generally decreased, functional  Tone & Sensation:   Tone: Normal  Sensation: Intact  Range Of Motion:  AROM: Generally decreased, functional  Functional Mobility:  Bed Mobility:   Supine to Sit: Contact guard assistance; Additional time   Scooting: Stand-by assistance  Transfers:  Sit to Stand: Contact guard assistance  Stand to Sit: Contact guard assistance  Balance:   Sitting: Intact  Standing: Intact; With support  Ambulation/Gait Training:  Distance (ft): 100 Feet (ft)  Assistive Device: Gait belt;Walker, rolling  Ambulation - Level of Assistance: Contact guard assistance;Stand-by assistance  Gait Description (WDL): Exceptions to WDL  Gait Abnormalities: Decreased step clearance; Antalgic; Step to gait  Right Side Weight Bearing: As tolerated   Base of Support: Shift to left  Stance: Right decreased  Speed/Sheri: Slow  Step Length: Right shortened;Left shortened  Stairs:  Number of Stairs Trained: 5  Stairs - Level of Assistance: Contact guard assistance  Rail Use: Both  Pain:  Pain level pre-treatment: 8/10   Pain level post-treatment: 8/10   Pain Intervention(s) : Medication (see MAR); Rest, Ice, Repositioning  Response to intervention: Nurse notified, See doc flow    Activity Tolerance:   Good  Please refer to the flowsheet for vital signs taken during this treatment.   After treatment:   [x]         Patient left in no apparent distress sitting up in chair  []         Patient left in no apparent distress in bed  [x]         Call bell left within reach  [x]         Nursing notified  []         Caregiver present  []         Bed alarm activated  [] SCDs applied    COMMUNICATION/EDUCATION:   [x]         Role of Physical Therapy in the acute care setting. [x]         Fall prevention education was provided and the patient/caregiver indicated understanding. [x]         Patient/family have participated as able in goal setting and plan of care. []         Patient/family agree to work toward stated goals and plan of care. []         Patient understands intent and goals of therapy, but is neutral about his/her participation. []         Patient is unable to participate in goal setting/plan of care: ongoing with therapy staff.  []         Other:     Thank you for this referral.  Rakesh Crawford   Time Calculation: 43 mins      Eval Complexity: History: MEDIUM  Complexity : 1-2 comorbidities / personal factors will impact the outcome/ POC Exam:LOW Complexity : 1-2 Standardized tests and measures addressing body structure, function, activity limitation and / or participation in recreation  Presentation: LOW Complexity : Stable, uncomplicated  Clinical Decision Making:Low Complexity    Overall Complexity:LOW

## 2022-03-31 NOTE — NURSE NAVIGATOR
Enriqueta Sunshine rounded on posterior hip replacement. Discussed the following during rounding: Activity:  OOB for all meals. Promoting Circulation  Ankle pumps 10 times an hour at hospital & home. Follow the hip precautions as instructed by Physical therapy and look at the education book to be reminded of hip precautions. Pain Control:  Pain medications side effects discussed. Use ice, distraction, moving, & change position to help with pain. Rest between activity. Reminded narcotics and anesthesia cause constipation so need to take stool softener/mild laxative daily while on narcotics. Reviewed how to safely elevate the leg after exercises for 30 minutes and before bed to decrease swelling while keeping your toes pointed to the ceiling and not crossing your legs. Incentive Spirometry:    Use of incentive spirometer 10 x/hr  Diet:   Eat for healing. Drink enough fluids so urine is lemonade in color. .   Reminded medication can cause nausea if taken on an empty stomach so need to eat before taking them. Patient Safety:   Call light & belongings in reach. Call for help when want to walk or get OOB. OOB to the chair with standby assist.  Patient ordering breakfast.      Wilfrido More verbalized understand. Given the opportunity for asking questions.    Orthopedic Navigator

## 2022-03-31 NOTE — PROGRESS NOTES
Problem: Self Care Deficits Care Plan (Adult)  Goal: *Acute Goals and Plan of Care (Insert Text)  Description: Initial Occupational Therapy Goals (3/31/2022) Within 7 day(s):    1. Patient will perform grooming standing sinkside with supervision for increased independence with ADLs. 2. Patient will perform LB dressing with supervision & A/E PRN for increased independence with ADLs. 3. Patient will perform toilet transfer with supervision for increased independence with ADLs. 4. Patient will perform all aspects of toileting with supervision for increased independence with ADLs. 5. Patient will independently apply energy conservation techniques with 1 verbal cue(s)for increased independence with ADLs. 6. Patient will perform bathroom mobility with supervision for increased independence/safety with ADLs. Outcome: Progressing Towards Goal   OCCUPATIONAL THERAPY EVALUATION    Patient: Rich Bourne (78 y.o. female)  Date: 3/31/2022  Primary Diagnosis: Unilateral primary osteoarthritis, right hip [M16.11]  Procedure(s) (LRB):  RIGHT HIP ARTHROPLASTY W/C-ARM (Right) 1 Day Post-Op   Precautions: Fall,WBAT,Total hip (posterior hip precautions)  PLOF: pt mod I for ADLs/functional mobility    ASSESSMENT AND RECOMMENDATIONS:  Based on the objective data described below, the patient presents with RLE decreased ROM and strength affecting LE ADLs. Pt found supine in bed, reporting pain 5/10, agreeable to therapy. Pt seen with PT for additional set of skilled hands. Educated pt on posterior hip precautions with pt verbalizing understanding. Pt able to transition to seated EOB with CGA/additional time. Educated pt on adaptive strategies for lower body ADLs and clothing modifications for increased independence. Provided/educated on use of hip kit for increased independence. Pt supervision for upper body dressing.  Pt was able to thread B feet through underwear/pants with min A, and CGA when standing to pull up to waist. Pt able to don socks/shoes with SBA using A/E. Pt CGA/SBA for STS/bathroom mobility with vc for safe use of RW, pt able to maintain hip precautions during functional transfers and ADLs. Pt left ambulating in hallway with PT, provided opportunity for pt to voice questions on ADL performance when home, pt has no further concerns. Patient will benefit from skilled Occupational Therapy intervention to maximize safety/independence with ADLs at d/c.    Education: Reviewed home safety, body mechanics, importance of moving every hour to prevent joint stiffness, role of ice for edema/pain control, Rolling Walker management/safety, and adaptive dressing techniques with patient verbalizing  understanding at this time     Patient will benefit from skilled intervention to address the above impairments. Patient's rehabilitation potential is considered to be Good  Factors which may influence rehabilitation potential include:   []             None noted  []             Mental ability/status  []             Medical condition  []             Home/family situation and support systems  []             Safety awareness  [x]             Pain tolerance/management  []             Other:        PLAN :  Recommendations and Planned Interventions:   [x]               Self Care Training                  [x]      Therapeutic Activities  [x]               Functional Mobility Training   []      Cognitive Retraining  []               Therapeutic Exercises           [x]      Endurance Activities  [x]               Balance Training                    []      Neuromuscular Re-Education  []               Visual/Perceptual Training     [x]      Home Safety Training  [x]               Patient Education                   [x]      Family Training/Education  []               Other (comment):    Frequency/Duration: Patient will be followed by Occupational Therapy 1-2 times per day/4-7 days per week to address goals.   Discharge Recommendations: Outpatient per MD protocol  Further Equipment Recommendations for Discharge: N/A     SUBJECTIVE:   Patient stated Liyah Buck had a bad night.     OBJECTIVE DATA SUMMARY:     Past Medical History:   Diagnosis Date    Arthritis     Chronic pain     Diabetes ([address]     Nausea & vomiting     Psychiatric disorder     Sleep apnea     cpap     Past Surgical History:   Procedure Laterality Date    HX HYSTERECTOMY  2008    HX ORTHOPAEDIC  2003    right knee torm menicus     Barriers to Learning/Limitations: yes;  physical  Compensate with: visual, verbal, tactile, kinesthetic cues/model    Home Situation/Prior Level of Function:   Home Situation  Home Environment: Private residence  # Steps to Enter: 3  One/Two Story Residence: One story  Living Alone: No  Support Systems: Parent(s)  Patient Expects to be Discharged to[de-identified] Home with outpatient services  Current DME Used/Available at Home: Walker, rolling,Shower chair,Commode, bedside  Tub or Shower Type: Tub/Shower combination  []  Right hand dominant   []  Left hand dominant    Cognitive/Behavioral Status:  Neurologic State: Alert  Orientation Level: Oriented X4  Cognition: Follows commands; Appropriate for age attention/concentration  Safety/Judgement: Awareness of environment; Insight into deficits    Skin: R hip incision w/ Mepilex   Edema: compression hose in place & applied ice     Coordination: BUE  Coordination: Within functional limits  Fine Motor Skills-Upper: Left Intact; Right Intact    Gross Motor Skills-Upper: Left Intact; Right Intact    Balance:  Sitting: Intact  Standing: Intact; With support    Strength: BUE  Strength: Generally decreased, functional    Tone & Sensation:BUE  Tone: Normal  Sensation: Intact    Range of Motion: BUE  AROM: Generally decreased, functional    Functional Mobility and Transfers for ADLs:  Bed Mobility:  Supine to Sit: Contact guard assistance; Additional time (vc)  Scooting: Stand-by assistance  Transfers:  Sit to Stand: Contact guard assistance;Stand-by assistance (vc)    ADL Assessment:  Feeding: Independent  Oral Facial Hygiene/Grooming: Stand-by assistance  Bathing: Minimum assistance; Adaptive equipment  Upper Body Dressing: Supervision  Lower Body Dressing: Contact guard assistance; Adaptive equipment  Toileting: Stand by assistance    ADL Intervention:  Upper Body Dressing Assistance  Dressing Assistance: Supervision  Pullover Shirt: Supervision    Lower Body Dressing Assistance  Dressing Assistance: Contact guard assistance  Underpants: Contact guard assistance  Pants With Button/Zipper: Contact guard assistance  Socks: Stand-by assistance  Slip on Shoes with Back: Stand-by assistance  Leg Crossed Method Used: No  Position Performed: Seated edge of bed  Cues: Verbal cues provided;Visual cues provided  Adaptive Equipment Used: Long handled shoe horn;Reacher;Sock aid    Cognitive Retraining  Safety/Judgement: Awareness of environment; Insight into deficits    Pain:  Pain level pre-treatment: 5/10  Pain level post-treatment: 5/10  Pain Intervention(s): Medication administer by Nursing (see MAR); Rest, Ice, Repositioning   Response to intervention: Nurse notified, see doc flow     Activity Tolerance:   Fair. Patient able to stand ~5 minute(s). Patient able to complete ADLs with intermittent rest breaks. Patient limited by pain, strength, ROM. Patient unsteady. Please refer to the flowsheet for vital signs taken during this treatment. After treatment:   []  Patient left in no apparent distress sitting up in chair  []  Patient sitting on EOB  []  Patient left in no apparent distress in bed  [x]  Call bell left within reach  [x]  Nursing notified  []  Caregiver present  [x]  Ice applied  []  SCD's on while back in bed  [] Bed alarm activated    COMMUNICATION/EDUCATION:   Communication/Collaboration:  [x]       Role of Occupational Therapy in the acute care setting. [x]      Home safety education was provided and the patient/caregiver indicated understanding.   [x] Patient/family have participated as able in goal setting and plan of care. [x]      Patient/family agree to work toward stated goals and plan of care. []      Patient understands intent and goals of therapy, but is neutral about his/her participation. []      Patient is unable to participate in plan of care at this time. Thank you for this referral.  Celia Law, OTR/L  Time Calculation: 30 mins    Eval Complexity: History: MEDIUM Complexity : Expanded review of history including physical, cognitive and psychosocial  history ; Examination: MEDIUM Complexity : 3-5 performance deficits relating to physical, cognitive , or psychosocial skils that result in activity limitations and / or participation restrictions; Decision Making:MEDIUM Complexity : Patient may present with comorbidities that affect occupational performnce.  Miniml to moderate modification of tasks or assistance (eg, physical or verbal ) with assesment(s) is necessary to enable patient to complete evaluation

## 2022-03-31 NOTE — PROGRESS NOTES
2000 Admission assessment completed at this time. Patient is alert and oriented x 4. Lungs clear on 2 L nasal cannula. Last bowel movement on 3/29. TEDs and SCDs applied to bilateral lower legs. Patient has an ABD pad and tape dressing. Denies numbness and tingling. Patient currently rating pain 10/10. Patient has an 18 gauge IV to the left forearm infusing lactated ringers at 125 mL/hr. Ice packs applied. Abduction pillow applied. Call light within reach. 2031 PRN Zofran administered for nausea. 2032 PRN 2 tabs Percocet 10 administered for severe pain. 2141 PRN Dilaudid 1 mg IV administered for break through pain. 2250 Attempted to ambulate patent to bedside commode. Patient sat up and immediately vomited 200 mL of clear emesis. 0021 PRN Zofran administered for nausea. 0416 PRN 2 tabs Percocet 10 administered for severe pain. PRN Zofran administered for nausea. Patient upset she is getting PO medication and not IV. Explained to patient we need to try the pills first and then IV for break through pain. Patient upset because she has not eaten anything. This nurse has tried to get patient to eat crackers, ginger ale and her dinner tray all night due to previous episodes of nausea and vomiting. Patient has been refusing even though educated it will help with nausea/vomiting from pain medications and anesthesia. 8707 Patient refusing to take PO meds. In bed crying and eating crackers. PRN IV Dilaudid 1 mg administered for severe pain. Informed patient her doctor only wants her to have two doses of IV Dilaudid and that she needs to tolerate PO meds for discharge home. Patient educated on ordering breakfast and provided with a menu. 0569 Patient refusing blood sugar check. Bedside and verbal shift change report given to Jose Daniel Green (oncoming nurse) by Sheldon Ryan RN (offgoing nurse). Report included the following information: SBAR, Kardex, MAR, and recent results.

## 2022-03-31 NOTE — DISCHARGE INSTRUCTIONS
Patient Education        Hip Replacement Surgery (Posterior): What to Expect at Home  Your Recovery  Hip replacement surgery replaces the worn parts of your hip joint. When you leave the hospital, you will probably be walking with crutches or a walker. You may be able to climb a few stairs and get in and out of bed and chairs. But you will need someone to help you at home until you have more energy and can move around better. You will go home with a bandage and stitches, staples, skin glue, or tape strips. You can remove the bandage when your doctor tells you to. If you have stitches or staples, your doctor will remove them about 2 weeks after your surgery. Glue or tape strips will fall off on their own over time. You may still have some mild pain, and the area may be swollen for 3 to 4 months after surgery. Your doctor may give you medicine for the pain. You will continue the rehabilitation program (rehab) you started in the hospital. The better you do with your rehab exercises, the sooner you will get your strength and movement back. Most people are able to return to work 4 weeks to 4 months after surgery. This care sheet gives you a general idea about how long it will take for you to recover. But each person recovers at a different pace. Follow the steps below to get better as quickly as possible. How can you care for yourself at home? Activity    · Your doctor may not want your affected leg to cross the center of your body toward the other leg. If so, your therapist may suggest these ideas:  ? Do not cross your legs. ? Be very careful as you get in or out of bed or a car so your leg does not cross the imaginary line in the middle of your body.     · Go slowly when you climb stairs. Make sure the lights are on. Have someone watch you, if you can. When you climb stairs:  ? Step up first with your unaffected leg. Then bring the affected leg up to the same step. Bring your crutches or cane up.   ? To go down stairs, reverse the order. First, put your crutches or cane on the lower step. Then bring the affected leg down to that step. Finally, step down with the unaffected leg.     · You can ride in a car, but stop at least once every hour to get out and walk around.     · You may want to sleep on your back. Don't reach down too far to pull up blankets when you lie in bed.     · If your doctor recommends exercises, do them as directed. You can cut back on your exercises if your muscles start to ache, but don't stop doing them.     · Rest when you feel tired. You may take a nap, but don't stay in bed all day.     · Work with your physical therapist to learn the best way to exercise. You will probably have to use a walker, crutches, or a cane for at least 4 to 6 weeks.     · Your doctor may advise you to stay away from activities that put stress on the joint. This includes sports such as tennis, football, and jogging.     · Try not to sit for too long at one time. You will feel less stiff if you take a short walk about every hour. When you sit, use chairs with arms, and don't sit in low chairs.     · Do not bend over more than 90 degrees (like the angle in a letter \"L\").     · Sleep on your back with your legs slightly apart or on your side with a pillow between your knees for about 6 weeks or as your doctor tells you. Do not sleep on your stomach or affected leg.     · Ask your doctor when you can drive again.     · Most people are able to return to work 4 weeks to 4 months after surgery.     · Ask your doctor when it is okay for you to have sex. Diet    · By the time you leave the hospital, you will probably be eating your normal diet. Your doctor may recommend that you take iron and vitamin supplements.     · Drink plenty of fluids (unless your doctor tells you not to).   · Eat healthy foods, and watch your portion sizes. Try to stay at your ideal weight.  Too much weight puts more stress on your new hip joint.     · You may notice that your bowel movements are not regular right after your surgery. This is common. Try to avoid constipation and straining with bowel movements. You may want to take a fiber supplement every day. If you have not had a bowel movement after a couple of days, ask your doctor about taking a mild laxative. Medicines    · Your doctor will tell you if and when you can restart your medicines. You will also get instructions about taking any new medicines.     · If you take aspirin or some other blood thinner, ask your doctor if and when to start taking it again. Make sure that you understand exactly what your doctor wants you to do.     · Your doctor may give you a blood-thinning medicine to prevent blood clots. If you take a blood thinner, be sure you get instructions about how to take your medicine safely. Blood thinners can cause serious bleeding problems. This medicine could be in pill form or as a shot (injection). If a shot is necessary, your doctor will tell you how to do this.     · Be safe with medicines. Take pain medicines exactly as directed. ? If the doctor gave you a prescription medicine for pain, take it as prescribed. ? If you are not taking a prescription pain medicine, ask your doctor if you can take an over-the-counter medicine.     · If you think your pain medicine is making you sick to your stomach:  ? Take your medicine after meals (unless your doctor has told you not to). ? Ask your doctor for a different pain medicine.     · If your doctor prescribed antibiotics, take them as directed. Do not stop taking them just because you feel better. You need to take the full course of antibiotics. Incision care    · If your doctor told you how to care for your cut (incision), follow your doctor's instructions. You will have a dressing over the cut. A dressing helps the incision heal and protects it.  Your doctor will tell you how to take care of this.     · If you did not get instructions, follow this general advice:  ? If you have strips of tape on the cut the doctor made, leave the tape on for a week or until it falls off.  ? If you have stitches or staples, your doctor will tell you when to come back to have them removed. ? If you have skin glue on the cut, leave it on until it falls off. Skin glue is also called skin adhesive or liquid stitches. ? Change the bandage every day. ? Wash the area daily with warm water, and pat it dry. Don't use hydrogen peroxide or alcohol. They can slow healing. ? You may cover the area with a gauze bandage if it oozes fluid or rubs against clothing. ? You may shower 24 to 48 hours after surgery. Pat the incision dry. Don't swim or take a bath for the first 2 weeks, or until your doctor tells you it is okay. Exercise    · Your physical therapist will teach you exercises to do at home. Always do them as your therapist tells you.     · Avoid activities where you might fall. Ice and elevation    · For pain, put ice or a cold pack on the area for 10 to 20 minutes at a time. Put a thin cloth between the ice and your skin. If your doctor recommended cold therapy using a portable machine, follow the instructions that came with the machine.     · Your ankle may swell for about 3 months. Prop up your ankle when you ice it or anytime you sit or lie down. Try to keep it above the level of your heart. This will help reduce swelling. Other instructions    · Wear compression stockings if your doctor told you to. These may help to prevent blood clots. Your doctor will tell you how long you need to keep wearing the compression stockings.     · Try to prevent falls. To avoid falling:  ? Arrange furniture so that you will not trip on it. ? Get rid of throw rugs, and move electrical cords out of the way. ? Walk only in areas with plenty of light. ? Put grab bars in showers and bathtubs. ? Try to avoid icy or snowy sidewalks.  Choose shoes with good traction, or consider using traction devices that attach to your shoes. ? Wear shoes with sturdy, flat soles. Follow-up care is a key part of your treatment and safety. Be sure to make and go to all appointments, and call your doctor if you are having problems. It's also a good idea to know your test results and keep a list of the medicines you take. When should you call for help? Call 911 anytime you think you may need emergency care. For example, call if:    · You passed out (lost consciousness).     · You have severe trouble breathing.     · You have sudden chest pain and shortness of breath, or you cough up blood. Call your doctor now or seek immediate medical care if:    · You have signs that your hip may be dislocated, including:  ? Severe pain and not being able to stand. ? A crooked leg that looks like your hip is out of position. ? Not being able to bend or straighten your leg.     · Your leg or foot is cool or pale or changes color.     · You cannot feel or move your leg.     · You have signs of a blood clot, such as:  ? Pain in your calf, back of the knee, thigh, or groin. ? Redness and swelling in your leg or groin.     · Your incision comes open and begins to bleed, or the bleeding increases.     · You feel like your heart is racing or beating irregularly.     · You have signs of infection, such as:  ? Increased pain, swelling, warmth, or redness. ? Red streaks leading from the incision. ? Pus draining from the incision. ? A fever. Watch closely for changes in your health, and be sure to contact your doctor if:    · You do not have a bowel movement after taking a laxative.     · You do not get better as expected. Where can you learn more? Go to http://www.gray.com/  Enter Q746 in the search box to learn more about \"Hip Replacement Surgery (Posterior): What to Expect at Home. \"  Current as of: July 1, 2021               Content Version: 13.2  © 4263-7868 [employer]. Care instructions adapted under license by [employer] (which disclaims liability or warranty for this information). If you have questions about a medical condition or this instruction, always ask your healthcare professional. [address] any warranty or liability for your use of this information.

## 2022-03-31 NOTE — PROGRESS NOTES
Progress Note  Date:3/31/2022       Room:[facility]  Patient Joan Hall     YOB: 1970     Age:51 y.o. Subjective    Subjective:  Symptoms:  Stable. Diet:  Poor intake. Activity level: Returning to normal.    Pain:  She complains of pain that is moderate. Review of Systems   Constitutional: Positive for fatigue. Respiratory: Negative. Cardiovascular: Negative. Objective         Vitals Last 24 Hours:  TEMPERATURE:  Temp  Av.1 °F (36.7 °C)  Min: 97.7 °F (36.5 °C)  Max: 98.8 °F (37.1 °C)  RESPIRATIONS RANGE: Resp  Avg: 15.5  Min: 10  Max: 22  PULSE OXIMETRY RANGE: SpO2  Av.2 %  Min: 95 %  Max: 100 %  PULSE RANGE: Pulse  Av.1  Min: 67  Max: 99  BLOOD PRESSURE RANGE: Systolic (16MOR), BZV:957 , Min:117 , CJK:110   ; Diastolic (77LOV), IMX:50, Min:55, Max:97    I/O (24Hr): Intake/Output Summary (Last 24 hours) at 3/31/2022 1012  Last data filed at 3/31/2022 0436  Gross per 24 hour   Intake 2800 ml   Output 850 ml   Net 1950 ml     Objective:  General Appearance:  Uncomfortable. Vital signs: (most recent): Blood pressure (!) 119/55, pulse 84, temperature 98.8 °F (37.1 °C), resp. rate 16, height 5' 4\" (1.626 m), weight 107.1 kg (236 lb 1.6 oz), SpO2 96 %. Vital signs are normal.    Output: Producing urine. Lungs:  Normal effort and normal respiratory rate. Heart: Normal rate. Extremities: There is local swelling. Pulses: Distal pulses are intact. Neurological: Patient is alert and oriented to person, place and time. Skin:  Warm. Labs/Imaging/Diagnostics    Labs:  CBC:  Recent Labs     22   HGB 12.4   HCT 37.9     CHEMISTRIES:  Recent Labs     22      K 4.1      CO2 28   BUN 13   CA 8.9   PT/INR:No results for input(s): INR, INREXT in the last 72 hours. No lab exists for component: PROTIME  APTT:No results for input(s): APTT in the last 72 hours.   LIVER PROFILE:  Recent Labs     03/30/22 2105   AST 23   ALT 27     Lab Results   Component Value Date/Time    ALT (SGPT) 27 03/30/2022 09:05 PM    AST (SGOT) 23 03/30/2022 09:05 PM    Alk. phosphatase 43 (L) 03/30/2022 09:05 PM    Bilirubin, total 0.4 03/30/2022 09:05 PM       Imaging Last 24 Hours:  NC XR TECHNOLOGIST SERVICE    Result Date: 3/31/2022  See impression. Fluoroscopy was provided for this procedure under the supervision and/or direction of the attending provider. ? For further information regarding this procedure, see patient medical record. XR HIP RT W OR WO PELV  1 VW    Result Date: 3/31/2022  Right hip History: Postop evaluation, osteoarthritis with pain Comparison: Right hip 3/22/2022 Single view of the hip demonstrates patient has undergone total hip arthroplasty with both femoral and acetabular components. Interlocking screw is seen through the superior acetabulum. Expected early postoperative soft tissue swelling and subcutaneous gas are present. Very mild amount of residual osseous spurring is present superiorly and laterally along the acetabulum. Alignment is unremarkable. Status post total hip arthroplasty with unremarkable alignment. Assessment//Plan   Active Problems:    Unilateral primary osteoarthritis, right hip (3/30/2022)      Assessment:    Condition: In stable condition. Improving. (S/p R BRITTANY). Plan:   Discharge home. Encourage ambulation. Advance diet as tolerated and regular diet. Administer medications as ordered and resume home regimen.          Electronically signed by Jeffrey Jurado MD on 3/31/2022 at 10:12 AM

## 2022-04-01 ENCOUNTER — HOSPITAL ENCOUNTER (OUTPATIENT)
Dept: PHYSICAL THERAPY | Age: 52
Discharge: HOME | End: 2022-04-01
Payer: COMMERCIAL

## 2022-04-01 ENCOUNTER — TELEPHONE (OUTPATIENT)
Dept: OTHER | Age: 52
End: 2022-04-01

## 2022-04-01 LAB — GLUCOSE BLD STRIP.AUTO-MCNC: 137 MG/DL (ref 70–110)

## 2022-04-01 PROCEDURE — 97110 THERAPEUTIC EXERCISES: CPT

## 2022-04-01 PROCEDURE — 97530 THERAPEUTIC ACTIVITIES: CPT

## 2022-04-01 PROCEDURE — 97116 GAIT TRAINING THERAPY: CPT

## 2022-04-01 NOTE — PROGRESS NOTES
[facility]  [address], [address]  Ph [phone]  Fx [phone]    Continued Plan of Care/ Re-certification for Physical Therapy Services      Patient name: Jose A Georges Start of Care: 3/28/2022   Referral source: Ita Keller MD : 1970                Medical Diagnosis: Right hip pain [M25.551]    Onset Date:chronic, progressive                Treatment Diagnosis: right hip pain                                              ICD-10: M25.551   Prior Hospitalization: see medical history Provider#: [id number]   Medications: Verified on Patient summary List    Comorbidities: right knee meniscus debridement; depression, arthritis, hysterectomy x3 with last being complete hysterectomy    Prior Level of Function: functionally independent, uses SPC for just shy of a year, active lifestyle    Visits from Start of Care: 2    Missed Visits: 0    Reporting Period: 3/28/22 to 22    The Plan of Care and following information is based on the patient's current status:    Key functional changes: Pt is now s/p Right BRITTANY with posterior hip precautions       Problem List: pain affecting function, decrease ROM, decrease strength, edema affecting function, impaired gait/ balance, decrease ADL/ functional abilitiies, decrease activity tolerance, decrease flexibility/ joint mobility and decrease transfer abilities    Treatment Plan: Therapeutic exercise, Therapeutic activities, Neuromuscular re-education, Physical agent/modality, Gait/balance training, Manual therapy, Patient education, Self Care training, Functional mobility training, Home safety training and Stair training     Goals for this certification period to be accomplished in 6  weeks:  Short Term Goals: To be accomplished in 2 weeks:  1. Patient will be independent and compliant with HEP to progress toward goals and restore functional mobility.    Eval Status: will issue HEP on first visit post op  Current: Given today 4/1/22     2. Patient will improve FOTO score by 15 points to improve functional tolerance for exercise. Eval Status: FOTO 26  FOTO score = an established functional score where 100 = no disability     3. Patient will improve pain in right hip to 5/10 at worst to improve standing and ambulation tolerance and restore prior level of function. Eval Status: 8/10 at worst  Current: 9/10 since surgery when nerve block wore off 4/1/22     4. Pt will have painfree right hip AROM WFL to aid in functional mechanics for ambulation/ADLs. Eval Status:   Hip Left Right   Flexion 90 deg 63 deg P! Extension 0 deg 0 deg   ABD 25 deg 5 deg P! ER 20 deg 5 deg P! IR 15 deg 0 deg P!    Current: Current  Hip Left Right   Flexion 90 deg 57 deg P! Extension 0 deg 0 deg   ABD 25 deg 9 deg P! ER 20 deg 5 deg P! IR 15 deg 0 deg P!         Long Term Goals: To be accomplished in 6 weeks:  1. Patient will improve FOTO score by 25 points to improve functional tolerance for walking for exercise and return to playing tennis. Eval Status: FOTO 26  FOTO score = an established functional score where 100 = no disability     2.   Pt will ambulate with no AD, step through gait pattern with equal stance time and stride length bilaterally and no evidence of trendelenberg in order to establish normal gait mechanics for walking and eventual return to participation in tennis.   Eval Status: step-to gait with significant reliance on UE support with AD, right trendelenberg with decreased stephanie, decreased stance time on right LE, stride length about 1/2 on right as compared to left LE; uses SPC in left UE primarily; amb              Reassessment: step-to gait with significant reliance on UE support with AD, right trendelenberg with decreased stephanie, decreased stance time on right LE, stride length about 1/2 on right as compared to left LE; uses RW      3.   Pt will have 5/5 bilateral LE strength to return to goals of playing tennis and getting into/out of backtub without difficulty or pain. Eval Status:   Hip Left (1-5) Right (1-5)   Hip Flexion 5 4   Hip Extension 5 4   Hip ABD 5 3+   Hip ADD 5 4   Hip ER 4+ 3+   Hip IR 4+ 3+      Knee Left (1-5) Right (1-5)   Knee Flexion 4+ 4   Knee Extension 4+ 4   Ankle PF 4+ 4+   Ankle DF 4+ 4+   Other        Current: 4/1/22  Hip Left (1-5) Right (1-5)   Hip Flexion 5 2   Hip Extension 5 4   Hip ABD 5 2   Hip ADD 5 2   Hip ER 4+ 2   Hip IR 4+ 2      Knee Left (1-5) Right (1-5)   Knee Flexion 4+ 2+   Knee Extension 4+ 3   Ankle PF 4+ 4   Ankle DF 4+ 4+   Other             4.   Patient will improve pain in right hip to 0/10 at worst to improve prolonged standing and ambulation tolerance and restore prior level of function. Eval Status: 8/10 at worst  Current: Current: 9/10 since surgery when nerve block wore off 4/1/22     Frequency / Duration: Patient to be seen 2 times per week for 6 weeks:    Assessment / Recommendations:Patient tolerated treatment session well today. Patient had no complaints with addition of heel slides, supine hip abd/add, ankle pumps, Long arc quad, and sit to stands to exercise program to accomplish improved strength. Pt is now s/p Right BRITTANY on 3/30/22 and is on [facility] [employer]. Pt is now now walking with a RW step to gait pattern. Pts walker needed to be adjusted due to being too short for patient. Pts strength and ROM has decreased slightly after surgery on Wednesday. Pt was given HEP today for above exercises and was reminded to walk at least 1x per hour while awake.   Patient continues to make steady progress toward goals and would benefit from continued skilled PT intervention to address remaining deficits outlined in goals below.     Patient will continue to benefit from skilled PT services to modify and progress therapeutic interventions, address functional mobility deficits, address ROM deficits, address strength deficits, analyze and address soft tissue restrictions, analyze and cue movement patterns, analyze and modify body mechanics/ergonomics, assess and modify postural abnormalities, address imbalance/dizziness and instruct in home and community integration to attain remaining goals. Poornima Scheuermann, PT 4/1/2022 8:12 AM    ________________________________________________________________________  I certify that the above Therapy Services are being furnished while the patient is under my care. I agree with the treatment plan and certify that this therapy is necessary. [] I have read the above and request that my patient continue as recommended.   [] I have read the above report and request that my patient continue therapy with the following changes/special instructions: _______________________________________  [] I have read the above report and request that my patient be discharged from therapy    Physician's Signature:____________________ Date:_________ TIME:________                                      Ita Keller MD    ** Signature, Date and Time must be completed for valid certification **  Please sign and return to [facility]  [address]  Ruchi Aggarwal, [address] [place], [place] [address]    Ph [phone]  Fx [phone]

## 2022-04-01 NOTE — TELEPHONE ENCOUNTER
Post Discharge Phone placed after Joint Replacement surgery   Spoke with Be Renteria    Pain management reviewed:     Reviewed pain medications. Patient states swelling is manageable. Patient reminded to elevate leg above heart level and use ice to help with swelling and pain relief. Reviewed walking every hour while awake   Going to therapy at 2 pm.    Appetite is good and not more nausea. .   Reviewed the importance of eating  healthy to heal and have energy. Bowels have not moved but taking a stool softener. Patient states dressing is intact without drainage. Denies any unusual symptoms no fever, shortness of breath.    Follow up appointment is scheduled with surgeon   Opportunity given for patient to ask questions

## 2022-04-01 NOTE — PROGRESS NOTES
PT DAILY TREATMENT NOTE    Patient Name: Jose A Georges  [id number]  : 1970  [x]  Patient  Verified  Payor: PRAVIN / Plan: Satya Carranza 74 / Product Type:  /    In time:205  Out time:245  Total Treatment Time (min): 40  Total Timed Codes (min): 40  1:1 Treatment Time (MC/BCBS only): 40   Visit #: 2 of 16    Treatment Dx: Right hip pain [M25.551]    SUBJECTIVE  Pain Level (0-10 scale): 6-7/10  Any medication changes, allergies to medications, adverse drug reactions, diagnosis change, or new procedure performed?: [x] No    [] Yes (see summary sheet for update)  Subjective functional status/changes:   [] No changes reported  Ptreported that surgeon seems to be happy with results of surgery. Pt is unhappy with pain and soreness along Lateral side of hip. OBJECTIVE    15 min Therapeutic Exercise:  [] See flow sheet :   Rationale: increase ROM, increase strength, improve coordination, improve balance and increase proprioception to improve the patients ability to restore PLOF    10 min Therapeutic Activity:  []  See flow sheet : reasessement   Rationale: increase ROM, increase strength, improve coordination, improve balance and increase proprioception  to improve the patients ability to complete transfers and ADLs without external assist     15 min Gait Training:  2x 30 feet 2x 20 feet with rolling walker step to gait pattern, decreased swing phase left, decreased stance phase right, slight trendelenburg, Stairs 1x4 steps step to pattern heavy reliance on UE. Rationale: To improve ambulation safety and efficiency in order to improve patient's ability to safely ambulate at home for self care.            With   [] TE   [] TA   [] neuro   [] other: Patient Education: [x] Review HEP    [] Progressed/Changed HEP based on:   [] positioning   [] body mechanics   [] transfers   [] heat/ice application    [] other:      Other Objective/Functional Measures: see goals assessment below     Pain Level (0-10 scale) post treatment: 6-7/10    ASSESSMENT/Changes in Function: Patient tolerated treatment session well today. Patient had no complaints with addition of heel slides, supine hip abd/add, ankle pumps, Long arc quad, and sit to stands to exercise program to accomplish improved strength. Pt is now s/p Right BRITTANY on 3/30/22 and is on [employer]. Pt is now now walking with a RW step to gait pattern. Pts walker needed to be adjusted due to being too short for patient. Pts strength and ROM has decreased slightly after surgery on Wednesday. Pt was given HEP today for above exercises and was reminded to walk at least 1x per hour while awake. Patient continues to make steady progress toward goals and would benefit from continued skilled PT intervention to address remaining deficits outlined in goals below. Patient will continue to benefit from skilled PT services to modify and progress therapeutic interventions, address functional mobility deficits, address ROM deficits, address strength deficits, analyze and address soft tissue restrictions, analyze and cue movement patterns, analyze and modify body mechanics/ergonomics, assess and modify postural abnormalities, address imbalance/dizziness and instruct in home and community integration to attain remaining goals. [x]  See Plan of Care  []  See progress note/recertification  []  See Discharge Summary         Progress towards goals / Updated goals:  Short Term Goals: To be accomplished in 2 weeks:  1. Patient will be independent and compliant with HEP to progress toward goals and restore functional mobility. Eval Status: will issue HEP on first visit post op  Current: Given today 4/1/22     2. Patient will improve FOTO score by 15 points to improve functional tolerance for exercise. Eval Status: FOTO 26  FOTO score = an established functional score where 100 = no disability     3.  Patient will improve pain in right hip to 5/10 at worst to improve standing and ambulation tolerance and restore prior level of function. Eval Status: 8/10 at worst  Current: 9/10 since surgery when nerve block wore off 4/1/22     4. Pt will have painfree right hip AROM WFL to aid in functional mechanics for ambulation/ADLs. Eval Status:   Hip Left Right   Flexion 90 deg 63 deg P! Extension 0 deg 0 deg   ABD 25 deg 5 deg P! ER 20 deg 5 deg P! IR 15 deg 0 deg P!    Current: Current  Hip Left Right   Flexion 90 deg 57 deg P! Extension 0 deg 0 deg   ABD 25 deg 9 deg P! ER 20 deg 5 deg P! IR 15 deg 0 deg P!        Long Term Goals: To be accomplished in 6 weeks:  1. Patient will improve FOTO score by 25 points to improve functional tolerance for walking for exercise and return to playing tennis. Eval Status: FOTO 26  FOTO score = an established functional score where 100 = no disability     2.   Pt will ambulate with no AD, step through gait pattern with equal stance time and stride length bilaterally and no evidence of trendelenberg in order to establish normal gait mechanics for walking and eventual return to participation in tennis. Eval Status: step-to gait with significant reliance on UE support with AD, right trendelenberg with decreased stephanie, decreased stance time on right LE, stride length about 1/2 on right as compared to left LE; uses SPC in left UE primarily; amb   Reassessment: step-to gait with significant reliance on UE support with AD, right trendelenberg with decreased stephanie, decreased stance time on right LE, stride length about 1/2 on right as compared to left LE; uses RW      3.   Pt will have 5/5 bilateral LE strength to return to goals of playing tennis and getting into/out of backtub without difficulty or pain.   Eval Status:   Hip Left (1-5) Right (1-5)   Hip Flexion 5 4   Hip Extension 5 4   Hip ABD 5 3+   Hip ADD 5 4   Hip ER 4+ 3+   Hip IR 4+ 3+      Knee Left (1-5) Right (1-5)   Knee Flexion 4+ 4   Knee Extension 4+ 4   Ankle PF 4+ 4+   Ankle DF 4+ 4+   Other        Current: 4/1/22  Hip Left (1-5) Right (1-5)   Hip Flexion 5 2   Hip Extension 5 4   Hip ABD 5 2   Hip ADD 5 2   Hip ER 4+ 2   Hip IR 4+ 2      Knee Left (1-5) Right (1-5)   Knee Flexion 4+ 2+   Knee Extension 4+ 3   Ankle PF 4+ 4   Ankle DF 4+ 4+   Other            4.   Patient will improve pain in right hip to 0/10 at worst to improve prolonged standing and ambulation tolerance and restore prior level of function.   Eval Status: 8/10 at worst  Current: Current: 9/10 since surgery when nerve block wore off 4/1/22     PLAN  []  Upgrade activities as tolerated     [x]  Continue plan of care  []  Update interventions per flow sheet       []  Discharge due to:_  []  Other:_      Glynis Scheuermann, PT 4/1/2022  8:12 AM    Future Appointments   Date Time Provider Ethan Aburto   4/1/2022  2:00 PM Mee Krishna [facility]   4/4/2022  2:00 PM Mee Krishna [facility]   4/6/2022 10:15 AM Mee Krishna [facility]   4/11/2022 11:00 AM Isamar Martinez, [facility]   4/13/2022 11:00 AM Adsit, Cheryle Hesselbach, [facility]

## 2022-04-04 ENCOUNTER — HOSPITAL ENCOUNTER (OUTPATIENT)
Dept: PHYSICAL THERAPY | Age: 52
Discharge: HOME | End: 2022-04-04
Payer: COMMERCIAL

## 2022-04-04 PROCEDURE — 97112 NEUROMUSCULAR REEDUCATION: CPT

## 2022-04-04 PROCEDURE — 97530 THERAPEUTIC ACTIVITIES: CPT

## 2022-04-04 PROCEDURE — 97110 THERAPEUTIC EXERCISES: CPT

## 2022-04-04 NOTE — PROGRESS NOTES
PT DAILY TREATMENT NOTE    Patient Name: Gregoria Soulier  [id number]  : 1970  [x]  Patient  Verified  Payor: PRAVIN / Plan: Satya Carranza 74 / Product Type:  /    In time:155  Out time:255  Total Treatment Time (min): 60  Total Timed Codes (min): 60  1:1 Treatment Time (MC/BCBS only): 60   Visit #: 3 of 12    Treatment Dx: Right hip pain [M25.551]    SUBJECTIVE  Pain Level (0-10 scale): 5/10  Any medication changes, allergies to medications, adverse drug reactions, diagnosis change, or new procedure performed?: [x] No    [] Yes (see summary sheet for update)  Subjective functional status/changes:   [] No changes reported  Pt reports that she was able to get herself dressed this AM.     OBJECTIVE    30 min Therapeutic Exercise:  [x] See flow sheet :   Rationale: increase ROM, increase strength, improve coordination, improve balance and increase proprioception to improve the patients ability to restore PLOF    15 min Therapeutic Activity:  [x]  See flow sheet :   Rationale: increase ROM, increase strength, improve coordination, improve balance and increase proprioception  to improve the patients ability to complete transfers and ADLs without external assist     15 min Neuromuscular Re-education:  [x]  See flow sheet :   Rationale: increase ROM, increase strength, improve coordination, improve balance and increase proprioception  to improve the patients ability to activate ankle and hip stabilizers without compensation    With   [] TE   [] TA   [] neuro   [] other: Patient Education: [x] Review HEP    [] Progressed/Changed HEP based on:   [] positioning   [] body mechanics   [] transfers   [] heat/ice application    [] other:        Pain Level (0-10 scale) post treatment: 6/10    ASSESSMENT/Changes in Function: Patient tolerated treatment session well today.  Patient had no complaints with addition of HR/TR, 3 way hip, marches,  to exercise program to accomplish improved gait pattern and transfers. Pt progressed to standing exercises today and progressed to step through gait pattern using RW. Patient continues to make steady progress toward goals and would benefit from continued skilled PT intervention to address remaining deficits outlined in goals below. Patient will continue to benefit from skilled PT services to modify and progress therapeutic interventions, address functional mobility deficits, address ROM deficits, address strength deficits, analyze and address soft tissue restrictions, analyze and cue movement patterns, analyze and modify body mechanics/ergonomics, assess and modify postural abnormalities, address imbalance/dizziness and instruct in home and community integration to attain remaining goals. [x]  See Plan of Care  []  See progress note/recertification  []  See Discharge Summary         Progress towards goals / Updated goals:  Short Term Goals: To be accomplished in 2 weeks:  1. Patient will be independent and compliant with HEP to progress toward goals and restore functional mobility. Eval Status: will issue HEP on first visit post op  Current: updated today 4/4/22     2. Patient will improve FOTO score by 15 points to improve functional tolerance for exercise. Eval Status: FOTO 26  FOTO score = an established functional score where 100 = no disability     3. Patient will improve pain in right hip to 5/10 at worst to improve standing and ambulation tolerance and restore prior level of function. Eval Status: 8/10 at worst  Current: 9/10 since surgery when nerve block wore off 4/1/22     4. Pt will have painfree right hip AROM WFL to aid in functional mechanics for ambulation/ADLs. Eval Status:   Hip Left Right   Flexion 90 deg 63 deg P! Extension 0 deg 0 deg   ABD 25 deg 5 deg P! ER 20 deg 5 deg P! IR 15 deg 0 deg P!    Current: Current  Hip Left Right   Flexion 90 deg 57 deg P! Extension 0 deg 0 deg   ABD 25 deg 9 deg P! ER 20 deg 5 deg P!    IR 15 deg 0 deg P!         Long Term Goals: To be accomplished in 6 weeks:  1. Patient will improve FOTO score by 25 points to improve functional tolerance for walking for exercise and return to playing tennis. Eval Status: FOTO 26  FOTO score = an established functional score where 100 = no disability     2.   Pt will ambulate with no AD, step through gait pattern with equal stance time and stride length bilaterally and no evidence of trendelenberg in order to establish normal gait mechanics for walking and eventual return to participation in tennis. Eval Status: step-to gait with significant reliance on UE support with AD, right trendelenberg with decreased stephanie, decreased stance time on right LE, stride length about 1/2 on right as compared to left LE; uses SPC in left UE primarily; amb              Reassessment: step-to gait with significant reliance on UE support with AD, right trendelenberg with decreased stephanie, decreased stance time on right LE, stride length about 1/2 on right as compared to left LE; uses RW      3.   Pt will have 5/5 bilateral LE strength to return to goals of playing tennis and getting into/out of backtub without difficulty or pain. Eval Status:   Hip Left (1-5) Right (1-5)   Hip Flexion 5 4   Hip Extension 5 4   Hip ABD 5 3+   Hip ADD 5 4   Hip ER 4+ 3+   Hip IR 4+ 3+      Knee Left (1-5) Right (1-5)   Knee Flexion 4+ 4   Knee Extension 4+ 4   Ankle PF 4+ 4+   Ankle DF 4+ 4+   Other        Current: 4/1/22  Hip Left (1-5) Right (1-5)   Hip Flexion 5 2   Hip Extension 5 4   Hip ABD 5 2   Hip ADD 5 2   Hip ER 4+ 2   Hip IR 4+ 2      Knee Left (1-5) Right (1-5)   Knee Flexion 4+ 2+   Knee Extension 4+ 3   Ankle PF 4+ 4   Ankle DF 4+ 4+   Other             4.   Patient will improve pain in right hip to 0/10 at worst to improve prolonged standing and ambulation tolerance and restore prior level of function.   Eval Status: 8/10 at worst  Current: Current: 9/10 since surgery when nerve block wore off 4/1/22    PLAN  [x]  Upgrade activities as tolerated     [x]  Continue plan of care  [x]  Update interventions per flow sheet       []  Discharge due to:_  []  Other:_      Ethan Hess, PT 4/4/2022  8:10 AM    Future Appointments   Date Time Provider Ethan Aburto   4/4/2022  2:00 PM Rich Matos, [facility]   4/6/2022 10:15 AM Rich Matos, [facility]   4/11/2022 11:00 AM Alireza Martinez, [facility]   [facility]/13[facility]2022 [facility]   4/20/2022 11:45 AM [facility], [facility]   4/22/2022  9:30 AM Madeleine Martinez [facility]   4/27/2022  9:30 AM AdsitLisa, PT [facility]   4/29/2022  9:30 AM Adsit, Memory Glory, PT [facility]

## 2022-04-06 ENCOUNTER — HOSPITAL ENCOUNTER (OUTPATIENT)
Dept: PHYSICAL THERAPY | Age: 52
Discharge: HOME | End: 2022-04-06
Payer: COMMERCIAL

## 2022-04-06 PROCEDURE — 97110 THERAPEUTIC EXERCISES: CPT

## 2022-04-06 PROCEDURE — 97530 THERAPEUTIC ACTIVITIES: CPT

## 2022-04-06 PROCEDURE — 97116 GAIT TRAINING THERAPY: CPT

## 2022-04-06 NOTE — PROGRESS NOTES
PT DAILY TREATMENT NOTE    Patient Name: Poncho Porter  Date:2022  : 1970  [x]  Patient  Verified  Payor:  / Plan: Satya Carranza 74 / Product Type:  /    In time:1003  Out time:1047  Total Treatment Time (min): 44  Total Timed Codes (min): 44  1:1 Treatment Time (MC/BCBS only): 44   Visit #: 4 of 12    Treatment Dx: Right hip pain [M25.551]    SUBJECTIVE  Pain Level (0-10 scale): 5/10  Any medication changes, allergies to medications, adverse drug reactions, diagnosis change, or new procedure performed?: [x] No    [] Yes (see summary sheet for update)  Subjective functional status/changes:   [] No changes reported  Pt reported that she feels todd the same as last visit    OBJECTIVE    15 min Therapeutic Exercise:  [x] See flow sheet :   Rationale: increase ROM, increase strength, improve coordination, improve balance and increase proprioception to improve the patients ability to restore PLOF    15 min Therapeutic Activity:  [x]  See flow sheet :   Rationale: increase ROM, increase strength, improve coordination, improve balance and increase proprioception  to improve the patients ability to complete transfers and ADLs without external assist     14 min Gait training:  [x]  See flow sheet : 240 feet with RW with improved step length on Left side crossing in front of right foot with each step. Pt continues to have decreased heel strike on left. Rationale: increase ROM, increase strength, improve coordination, improve balance and increase proprioception  to improve the patients ability to walk household distances         With   [x] TE   [x] TA   [] neuro   [] other: Patient Education: [x] Review HEP    [] Progressed/Changed HEP based on:   [] positioning   [] body mechanics   [] transfers   [] heat/ice application    [] other:      Pain Level (0-10 scale) post treatment: 5/10    ASSESSMENT/Changes in Function: Patient tolerated treatment session well today.  Patient had no complaints with addition of Min[place]qu[place] to exercise program to accomplish improved strength for transfers and gait. Pt is showing . Patient continues to make steady progress toward goals and would benefit from continued skilled PT intervention to address remaining deficits outlined in goals below. Patient will continue to benefit from skilled PT services to modify and progress therapeutic interventions, address functional mobility deficits, address ROM deficits, address strength deficits, analyze and address soft tissue restrictions, analyze and cue movement patterns, analyze and modify body mechanics/ergonomics, assess and modify postural abnormalities and instruct in home and community integration to attain remaining goals. [x]  See Plan of Care  []  See progress note/recertification  []  See Discharge Summary         Progress towards goals / Updated goals:  Short Term Goals: To be accomplished in 2 weeks:  1. Patient will be independent and compliant with HEP to progress toward goals and restore functional mobility. Eval Status: will issue HEP on first visit post op  Current: updated today 4/4/22     2. Patient will improve FOTO score by 15 points to improve functional tolerance for exercise. Eval Status: FOTO 26  FOTO score = an established functional score where 100 = no disability     3. Patient will improve pain in right hip to 5/10 at worst to improve standing and ambulation tolerance and restore prior level of function. Eval Status: 8/10 at worst  Current: 7/10 at worst 4/6/22     4. Pt will have painfree right hip AROM WFL to aid in functional mechanics for ambulation/ADLs. Eval Status:   Hip Left Right   Flexion 90 deg 63 deg P! Extension 0 deg 0 deg   ABD 25 deg 5 deg P! ER 20 deg 5 deg P! IR 15 deg 0 deg P!    Current: Current  Hip Left Right   Flexion 90 deg 57 deg P! Extension 0 deg 0 deg   ABD 25 deg 9 deg P! ER 20 deg 5 deg P!    IR 15 deg 0 deg P!         Long Term Goals: To be accomplished in 6 weeks:  1. Patient will improve FOTO score by 25 points to improve functional tolerance for walking for exercise and return to playing tennis. Eval Status: FOTO 26  FOTO score = an established functional score where 100 = no disability     2.   Pt will ambulate with no AD, step through gait pattern with equal stance time and stride length bilaterally and no evidence of trendelenberg in order to establish normal gait mechanics for walking and eventual return to participation in tennis. Eval Status: step-to gait with significant reliance on UE support with AD, right trendelenberg with decreased stephanie, decreased stance time on right LE, stride length about 1/2 on right as compared to left LE; uses SPC in left UE primarily; amb              Reassessment: step-to gait with significant reliance on UE support with AD, right trendelenberg with decreased stephanie, decreased stance time on right LE, stride length about 1/2 on right as compared to left LE; uses RW      3.   Pt will have 5/5 bilateral LE strength to return to goals of playing tennis and getting into/out of backtub without difficulty or pain. Eval Status:   Hip Left (1-5) Right (1-5)   Hip Flexion 5 4   Hip Extension 5 4   Hip ABD 5 3+   Hip ADD 5 4   Hip ER 4+ 3+   Hip IR 4+ 3+      Knee Left (1-5) Right (1-5)   Knee Flexion 4+ 4   Knee Extension 4+ 4   Ankle PF 4+ 4+   Ankle DF 4+ 4+   Other        Current: 4/1/22  Hip Left (1-5) Right (1-5)   Hip Flexion 5 2   Hip Extension 5 4   Hip ABD 5 2   Hip ADD 5 2   Hip ER 4+ 2   Hip IR 4+ 2      Knee Left (1-5) Right (1-5)   Knee Flexion 4+ 2+   Knee Extension 4+ 3   Ankle PF 4+ 4   Ankle DF 4+ 4+   Other             4.   Patient will improve pain in right hip to 0/10 at worst to improve prolonged standing and ambulation tolerance and restore prior level of function.   Eval Status: 8/10 at worst  Current: Current: 9/10 since surgery when nerve block wore off 4/1/22     PLAN  [x]  Upgrade activities as tolerated     [x]  Continue plan of care  [x]  Update interventions per flow sheet       []  Discharge due to:_  []  Other:_      Liz Milner, PT 4/6/2022  10:06 AM    Future Appointments   Date Time Provider Ethan Aburto   4/6/2022 10:15 AM Ruth Bhagat, [facility]   4/11/2022 11:00 AM Vivien Martinez, [facility]   4/13/2022 11:00 AM Car Alvarez [facility]   4/20/2022 11:45 AM Car Alvarez [facility]   4/22/2022  9:30 AM Princess Bala Martinez [facility]   4/27/2022  9:30 AM Jennifer Martinez, [facility]   4/29/2022  9:30 AM Jennifer Martinez, [facility]

## 2022-04-13 ENCOUNTER — HOSPITAL ENCOUNTER (OUTPATIENT)
Dept: PHYSICAL THERAPY | Age: 52
Discharge: HOME | End: 2022-04-13
Payer: COMMERCIAL

## 2022-04-13 PROCEDURE — 97110 THERAPEUTIC EXERCISES: CPT

## 2022-04-13 PROCEDURE — 97530 THERAPEUTIC ACTIVITIES: CPT

## 2022-04-13 PROCEDURE — 97112 NEUROMUSCULAR REEDUCATION: CPT

## 2022-04-13 NOTE — PROGRESS NOTES
PT DAILY TREATMENT NOTE    Patient Name: Lor Ribeiro  Date: 2022  : 1970  [x]  Patient  Verified  Payor:  / Plan: Satya Carranza 76 / Product Type:  /    In Time: 11:00  Out Time: 11:48  Total Treatment Time (min): 48  Total Timed Codes (min): 48  1:1 Treatment Time ( [address] only): 39   Visit #:     Treatment Dx: Right hip pain [M25.551]    SUBJECTIVE  Pre-Treatment Pain Level (0-10 scale): 4    Any medication changes, allergies to medications, adverse drug reactions, diagnosis change, or new procedure performed?: [x] No    [] Yes (see summary sheet for update)    Subjective Functional Status/Changes:  [] No changes reported  Patient states she's been walking from one side of her home to the other side for about 5 minutes every hour during the day time.     OBJECTIVE    25 min Therapeutic Exercise:  [x] See flow sheet   Rationale: increase ROM, increase strength, and decrease pain to assist in improving patient's ability to perform functional activities and ADLs    12 min Therapeutic Activity:  [x] See flow sheet   Rationale: increase ROM, increase strength, and improve coordination to assist in improving patient's ability to perform functional activities and ADLs    8 min Neuromuscular Re-Education:  [x] See flow sheet   Rationale: improve coordination, improve balance/proprioception, improve posture, and improve core stabilization to assist in improving patient's ability to perform functional activities and ADLs as well as to improve core stabilization during static/dynamic movements      With   [x] TE   [] TA   [] neuro   [] other: Patient Education: [x] Review HEP    [] Progressed/Changed HEP based on:   [] positioning   [] body mechanics   [] transfers   [] heat/ice application    [] other:      Other Objective/Functional Measures: N/A     Pain Level (0-10 scale) Post Treatment: 4    ASSESSMENT/Changes in Function:  Patient responded well to today's treatment without any adverse reactions. Patient did well with the advancement of performing standing 3 way hip bilaterally today, without reports of increased pain and she did so with good stability. Patient appears to be improving with core/LE strength as shown by improved form and ability during minisquats, sit <> stands, and also advancing to 2# ankle weights during LAQs today. Recommend patient start to further increase her walking times to 10' each walk, 3-5x/day to further assist in improving LE strength as well as gait and endurance. Continue per POC. Patient will continue to benefit from skilled PT services to further modify and progress therapeutic interventions, address functional mobility deficits, address ROM deficits, address strength deficits, analyze and address soft tissue restrictions, analyze and cue movement patterns, analyze and modify body mechanics/ergonomics, assess and modify postural abnormalities, and instruct in home and community integration to attain remaining goals. [x]  See Plan of Care  []  See Progress Note/Recertification  []  See Discharge Summary         Progress Towards Goals/Updated Goals:    Short Term Goals: To be accomplished in 2 weeks:  1. Patient will be independent and compliant with HEP to progress toward goals and restore functional mobility. Eval Status: will issue HEP on first visit post op  Current: Patient reports daily compliance with her HEP.    4/11/22, met     2. Patient will improve FOTO score by 15 points to improve functional tolerance for exercise. Eval Status: FOTO 26  Current:   50      4/13/22, in progress  FOTO score = an established functional score where 100 = no disability     3. Patient will improve pain in right hip to 5/10 at worst to improve standing and ambulation tolerance and restore prior level of function. Eval Status: 8/10 at worst  Current: 8/10 at worst during the last week     4/13/22, in progress     4.  Pt will have painfree right hip AROM WFL to aid in functional mechanics for ambulation/ADLs. Eval Status:   Hip Left Right   Flexion 90 deg 63 deg P! Extension 0 deg 0 deg   ABD 25 deg 5 deg P! ER 20 deg 5 deg P! IR 15 deg 0 deg P!    Current: Current  Hip Left Right   Flexion 90 deg 57 deg P! Extension 0 deg 0 deg   ABD 25 deg 9 deg P! ER 20 deg 5 deg P! IR 15 deg 0 deg P!         Long Term Goals: To be accomplished in 6 weeks:  1. Patient will improve FOTO score by 25 points to improve functional tolerance for walking for exercise and return to playing tennis. Eval Status: FOTO 26  Current:   50      4/13/22, in progress  FOTO score = an established functional score where 100 = no disability     2.   Pt will ambulate with no AD, step through gait pattern with equal stance time and stride length bilaterally and no evidence of trendelenberg in order to establish normal gait mechanics for walking and eventual return to participation in tennis. Eval Status: step-to gait with significant reliance on UE support with AD, right trendelenberg with decreased stephanie, decreased stance time on right LE, stride length about 1/2 on right as compared to left LE; uses SPC in left UE primarily; amb              Reassessment: step-to gait with significant reliance on UE support with AD, right trendelenberg with decreased stephanie, decreased stance time on right LE, stride length about 1/2 on right as compared to left LE; uses RW      3.   Pt will have 5/5 bilateral LE strength to return to goals of playing tennis and getting into/out of backtub without difficulty or pain.   Eval Status:   Hip Left (1-5) Right (1-5)   Hip Flexion 5 4   Hip Extension 5 4   Hip ABD 5 3+   Hip ADD 5 4   Hip ER 4+ 3+   Hip IR 4+ 3+      Knee Left (1-5) Right (1-5)   Knee Flexion 4+ 4   Knee Extension 4+ 4   Ankle PF 4+ 4+   Ankle DF 4+ 4+   Other        Current: 4/1/22  Hip Left (1-5) Right (1-5)   Hip Flexion 5 2   Hip Extension 5 4   Hip ABD 5 2   Hip ADD 5 2   Hip ER 4+ 2 Hip IR 4+ 2      Knee Left (1-5) Right (1-5)   Knee Flexion 4+ 2+   Knee Extension 4+ 3   Ankle PF 4+ 4   Ankle DF 4+ 4+   Other             4.   Patient will improve pain in right hip to 0/10 at worst to improve prolonged standing and ambulation tolerance and restore prior level of function. Eval Status: 8/10 at worst    PLAN  []  Upgrade Activities as Tolerated     [x]  Continue Plan of Care  []  Update Interventions per Flow Sheet       []  Discharge Due To:_  []  Other:_      Raoul Martinez, PT, DPT, ATR  4/13/2022 9:39 AM    Future Appointments   Date Time Provider Ethan Criselda   4/13/2022 11:00 AM Sheron Wheatley [facility]   4/20/2022 11:45 AM Debra Cerda PT [facility]   4/22/2022  9:30 AM Corry Martinez [facility]   4/27/2022  9:30 AM Tresa Martinez, ARNOLDO [facility]   4/29/2022  9:30 AM Tresa Martinez, ARNOLDO [facility]

## 2022-04-20 ENCOUNTER — HOSPITAL ENCOUNTER (OUTPATIENT)
Dept: PHYSICAL THERAPY | Age: 52
Discharge: HOME | End: 2022-04-20
Payer: COMMERCIAL

## 2022-04-20 PROCEDURE — 97112 NEUROMUSCULAR REEDUCATION: CPT

## 2022-04-20 PROCEDURE — 97110 THERAPEUTIC EXERCISES: CPT

## 2022-04-20 PROCEDURE — 97530 THERAPEUTIC ACTIVITIES: CPT

## 2022-04-20 NOTE — PROGRESS NOTES
PT DAILY TREATMENT NOTE    Patient Name: Oralia Yu  Date:2022  : 1970  [x]  Patient  Verified  Payor: PRAVIN / Plan: Satya Carranza 76 / Product Type: Jemal Hoffman /    In time:1145  Out time:1230  Total Treatment Time (min): 45  Total Timed Codes (min): 45  1:1 Treatment Time ([facility] only): 45   Visit #: 7 of 12    Treatment Dx: Right hip pain [M25.551]    SUBJECTIVE  Pain Level (0-10 scale): 4  Any medication changes, allergies to medications, adverse drug reactions, diagnosis change, or new procedure performed?: [x] No    [] Yes (see summary sheet for update)  Subjective functional status/changes:   [] No changes reported  Pt pleasant, reports she still \"feels it\"    OBJECTIVE    10 min Therapeutic Exercise:  [x] See flow sheet :   Rationale: increase ROM and increase strength to improve the patients ability to restore PLOF     15 min Therapeutic Activity:  [x]  See flow sheet :   Rationale: increase strength and improve coordination  to improve the patients ability to complete transfers and ADLs without external assist      20 min Neuromuscular Re-education:  [x]  See flow sheet :   Rationale: improve coordination, improve balance and increase proprioception  to improve the patients ability to activate hip and ankle stabilizers without compensation           With   [x] TE   [x] TA   [x] neuro   [] other: Patient Education: [x] Review HEP    [] Progressed/Changed HEP based on:   [] positioning   [] body mechanics   [] transfers   [] heat/ice application    [] other:      Other Objective/Functional Measures: NA     Pain Level (0-10 scale) post treatment: 4    ASSESSMENT/Changes in Function: Patient tolerated treatment session well today. Patient had no complaints with addition of resistance to hip abd in exercise program to accomplish improved hip strength. Will add resistance to hip 3 way next visit to improve hip strength as well.   Patient continues to make good progress toward goals and would benefit from continued skilled PT intervention to address remaining deficits outlined in goals below. Patient will continue to benefit from skilled PT services to modify and progress therapeutic interventions, address functional mobility deficits, address ROM deficits, address strength deficits, analyze and address soft tissue restrictions, analyze and cue movement patterns, analyze and modify body mechanics/ergonomics and assess and modify postural abnormalities to attain remaining goals. [x]  See Plan of Care  []  See progress note/recertification  []  See Discharge Summary         Progress towards goals / Updated goals:  Short Term Goals: To be accomplished in 2 weeks:  1. Patient will be independent and compliant with HEP to progress toward goals and restore functional mobility. Eval Status: will issue HEP on first visit post op  Current: Patient reports daily compliance with her HEP.    4/11/22, met     2. Patient will improve FOTO score by 15 points to improve functional tolerance for exercise. Eval Status: FOTO 26  Current:   50      4/13/22, in progress  FOTO score = an established functional score where 100 = no disability     3. Patient will improve pain in right hip to 5/10 at worst to improve standing and ambulation tolerance and restore prior level of function. Eval Status: 8/10 at worst  Current: 6/10 at worst in last week - reports she had a good followup visit with MD and was able to take a shower and return to work which she is more sore and fatigued from 4/20/22, in progress     4. Pt will have painfree right hip AROM WFL to aid in functional mechanics for ambulation/ADLs. Eval Status:   Hip Left Right   Flexion 90 deg 63 deg P! Extension 0 deg 0 deg   ABD 25 deg 5 deg P! ER 20 deg 5 deg P! IR 15 deg 0 deg P!    Current: Current  Hip Left Right   Flexion 90 deg 57 deg P! Extension 0 deg 0 deg   ABD 25 deg 9 deg P! ER 20 deg 5 deg P! IR 15 deg 0 deg P!       Long Term Goals: To be accomplished in 6 weeks:  1. Patient will improve FOTO score by 25 points to improve functional tolerance for walking for exercise and return to playing tennis. Eval Status: FOTO 26  Current:   50      4/13/22, in progress  FOTO score = an established functional score where 100 = no disability     2.   Pt will ambulate with no AD, step through gait pattern with equal stance time and stride length bilaterally and no evidence of trendelenberg in order to establish normal gait mechanics for walking and eventual return to participation in tennis. Eval Status: step-to gait with significant reliance on UE support with AD, right trendelenberg with decreased stephanie, decreased stance time on right LE, stride length about 1/2 on right as compared to left LE; uses SPC in left UE primarily; amb              Reassessment: step-to gait with significant reliance on UE support with AD, right trendelenberg with decreased stephanie, decreased stance time on right LE, stride length about 1/2 on right as compared to left LE; uses RW      3.   Pt will have 5/5 bilateral LE strength to return to goals of playing tennis and getting into/out of backtub without difficulty or pain. Eval Status:   Hip Left (1-5) Right (1-5)   Hip Flexion 5 4   Hip Extension 5 4   Hip ABD 5 3+   Hip ADD 5 4   Hip ER 4+ 3+   Hip IR 4+ 3+      Knee Left (1-5) Right (1-5)   Knee Flexion 4+ 4   Knee Extension 4+ 4   Ankle PF 4+ 4+   Ankle DF 4+ 4+   Other        Current: 4/1/22  Hip Left (1-5) Right (1-5)   Hip Flexion 5 2   Hip Extension 5 4   Hip ABD 5 2   Hip ADD 5 2   Hip ER 4+ 2   Hip IR 4+ 2      Knee Left (1-5) Right (1-5)   Knee Flexion 4+ 2+   Knee Extension 4+ 3   Ankle PF 4+ 4   Ankle DF 4+ 4+   Other             4.   Patient will improve pain in right hip to 0/10 at worst to improve prolonged standing and ambulation tolerance and restore prior level of function.   Eval Status: 8/10 at worst      PLAN  []  Upgrade activities as tolerated     [x]  Continue plan of care  []  Update interventions per flow sheet       []  Discharge due to:_  []  Other:_      Catlaina Retana PT 4/20/2022  11:43 AM    Future Appointments   Date Time Provider Ethan Aburto   4/20/2022 11:45 AM Melquiades Martinez, [facility]   4/22/2022  9:30 AM Melquiades Martinez, [facility]   4/27/2022  9:30 AM Melquiades Martinez, [facility]   4/29/2022  9:30 AM Melquiades Martinez, [facility]   5/4/2022  3:30 PM Yahir Sarkar, [facility]   5/6/2022  8:45 AM Maile Martinez, [facility]   5/11/2022  3:30 PM Yahir Sarkar, [address] [facility]   5/13/2022  3:30 PM Yahir Sarkar, [address] [facility]

## 2022-04-22 ENCOUNTER — HOSPITAL ENCOUNTER (OUTPATIENT)
Dept: PHYSICAL THERAPY | Age: 52
Discharge: HOME | End: 2022-04-22
Payer: COMMERCIAL

## 2022-04-22 PROCEDURE — 97110 THERAPEUTIC EXERCISES: CPT

## 2022-04-22 PROCEDURE — 97116 GAIT TRAINING THERAPY: CPT

## 2022-04-22 PROCEDURE — 97530 THERAPEUTIC ACTIVITIES: CPT

## 2022-04-22 PROCEDURE — 97112 NEUROMUSCULAR REEDUCATION: CPT

## 2022-04-22 NOTE — PROGRESS NOTES
PT DAILY TREATMENT NOTE    Patient Name: Abdifatah Wallis  Date:2022  : 1970  [x]  Patient  Verified  Payor:  / Plan: Satya Carranza 76 / Product Type:  /    In time:935  Out time:1019  Total Treatment Time (min): 44  Total Timed Codes (min): 44  1:1 Treatment Time ([facility] only): 44   Visit #: 8 of 12    Treatment Dx: Right hip pain [M25.551]    SUBJECTIVE  Pain Level (0-10 scale): 4/10  Any medication changes, allergies to medications, adverse drug reactions, diagnosis change, or new procedure performed?: [x] No    [] Yes (see summary sheet for update)  Subjective functional status/changes:   [] No changes reported  Pt reported that she returns to MD at the end of may for reassessment     OBJECTIVE    15 min Therapeutic Exercise:  [x] See flow sheet :   Rationale: increase ROM, increase strength, improve coordination, improve balance and increase proprioception to improve the patients ability to restore PLOF    15 min Therapeutic Activity:  [x]  See flow sheet :   Rationale: increase ROM, increase strength, improve coordination, improve balance and increase proprioception  to improve the patients ability to complete transfers and ADLs without external assist     4 min Neuromuscular Re-education:  [x]  See flow sheet :   Rationale: increase ROM, increase strength, improve coordination, improve balance and increase proprioception  to improve the patients ability to activate ankle and hip stabilizers without compensation    10 min Gait Training: 320 feet with SPC Step through gait using SPC, slight decreased stance time on right LE, good pairing of SPC with right stance phase. Rationale: To improve ambulation safety and efficiency in order to improve patient's ability to safely ambulate at home for self care.      With   [] TE   [] TA   [] neuro   [] other: Patient Education: [x] Review HEP    [] Progressed/Changed HEP based on:   [] positioning   [] body mechanics   [] transfers   [] heat/ice application    [] other:      Other Objective/Functional Measures: see goals assessment below     Pain Level (0-10 scale) post treatment: 4/10    ASSESSMENT/Changes in Function: Patient tolerated treatment session well today. Patient had no complaints with addition of step ups and YTB to 3 way hip to exercise program to accomplish improved LE strength and decreased pain . Pt is progressing well toward all goals. Pt has less pain with hip ROM and strength. Pt is improving with gait pattern with progression to [facility]. Patient continues to make steady progress toward goals and would benefit from continued skilled PT intervention to address remaining deficits outlined in goals below. Patient will continue to benefit from skilled PT services to modify and progress therapeutic interventions, address functional mobility deficits, address ROM deficits, address strength deficits, analyze and address soft tissue restrictions, analyze and cue movement patterns, analyze and modify body mechanics/ergonomics, assess and modify postural abnormalities, address imbalance/dizziness and instruct in home and community integration to attain remaining goals. [x]  See Plan of Care  []  See progress note/recertification  []  See Discharge Summary         Progress towards goals / Updated goals:  Short Term Goals: To be accomplished in 2 weeks:  1. Patient will be independent and compliant with HEP to progress toward goals and restore functional mobility. Eval Status: will issue HEP on first visit post op  Current: Patient reports daily compliance with her HEP.    4/11/22, met     2. Patient will improve FOTO score by 15 points to improve functional tolerance for exercise. Eval Status: [facility] 52  BOJHKPY:   24      8/13/81, in progress  FOTO score = an established functional score where 100 = no disability     3.  Patient will improve pain in right hip to 5/10 at worst to improve standing and ambulation tolerance and restore prior level of function. Eval Status: 8/10 at worst  Progress note: 5/10 at worst 4/22/22     4. Pt will have painfree right hip AROM WFL to aid in functional mechanics for ambulation/ADLs. Eval Status:   Hip Left Right   Flexion 90 deg 63 deg P! Extension 0 deg 0 deg   ABD 25 deg 5 deg P! ER 20 deg 5 deg P! IR 15 deg 0 deg P! Reassessment   Hip Left Right   Flexion 90 deg 57 deg P! Extension 0 deg 0 deg   ABD 25 deg 9 deg P! ER 20 deg 5 deg P! IR 15 deg 0 deg P! Progress note: 4/22/22  Hip Left Right   Flexion 90 deg 90   Extension 0 deg 0 deg   ABD 25 deg 20 deg   ER 20 deg 10 deg P! IR 15 deg 0 deg         Long Term Goals: To be accomplished in 6 weeks:  1. Patient will improve FOTO score by 25 points to improve functional tolerance for walking for exercise and return to playing tennis. Eval Status: FOTO 26  Current:   50      4/13/22, in progress  FOTO score = an established functional score where 100 = no disability     2.   Pt will ambulate with no AD, step through gait pattern with equal stance time and stride length bilaterally and no evidence of trendelenberg in order to establish normal gait mechanics for walking and eventual return to participation in tennis. Eval Status: step-to gait with significant reliance on UE support with AD, right trendelenberg with decreased stephanie, decreased stance time on right LE, stride length about 1/2 on right as compared to left LE; uses SPC in left UE primarily; amb              Reassessment: step-to gait with significant reliance on UE support with AD, right trendelenberg with decreased stephanie, decreased stance time on right LE, stride length about 1/2 on right as compared to left LE; uses RW    Progress note: Step through gait using SPC, slight decreased stance time on right LE, good pairing of SPC with right stance phase.   4/22/22       3.   Pt will have 5/5 bilateral LE strength to return to goals of playing tennis and getting into/out of backtub without difficulty or pain. Eval Status:   Hip Left (1-5) Right (1-5)   Hip Flexion 5 4   Hip Extension 5 4   Hip ABD 5 3+   Hip ADD 5 4   Hip ER 4+ 3+   Hip IR 4+ 3+      Knee Left (1-5) Right (1-5)   Knee Flexion 4+ 4   Knee Extension 4+ 4   Ankle PF 4+ 4+   Ankle DF 4+ 4+   Other       Reassessment: 4/1/22  Hip Left (1-5) Right (1-5)   Hip Flexion 5 2   Hip Extension 5 4   Hip ABD 5 2   Hip ADD 5 2   Hip ER 4+ 2   Hip IR 4+ 2      Knee Left (1-5) Right (1-5)   Knee Flexion 4+ 2+   Knee Extension 4+ 3   Ankle PF 4+ 4   Ankle DF 4+ 4+   Other        Progress note: 4/22/22  Hip Left (1-5) Right (1-5)   Hip Flexion 5 4+   Hip Extension 5 4   Hip ABD 5 3   Hip ADD 5 3   Hip ER 4+ 4   Hip IR 4+ 2      Knee Left (1-5) Right (1-5)   Knee Flexion 4+ 4   Knee Extension 4+ 4+   Ankle PF 4+ 4   Ankle DF 4+ 5   Other             4.   Patient will improve pain in right hip to 0/10 at worst to improve prolonged standing and ambulation tolerance and restore prior level of function.    Eval Status: 8/10 at worst   Progress note: 5/10 at worst 4/22/22    PLAN  [x]  Upgrade activities as tolerated     [x]  Continue plan of care  [x]  Update interventions per flow sheet       []  Discharge due to:_  []  Other:_      Farhana Licona PT 4/22/2022  9:39 AM    Future Appointments   Date Time Provider Ethan Aburto   4/27/2022  9:30 AM Cookie Harrell PT [facility]   4/29/2022  9:30 AM Abraham Martinez [facility]   5/4/2022  3:30 PM Clarita Del Valle, ARNOLDO [facility]   5/6/2022  8:45 AM Joselito Martinez, PT [facility]   5/11/2022  3:30 PM Clarita Del Valle, PT [facility]   5/13/2022  3:30 PM Clarita Del Valle PT [facility]

## 2022-04-22 NOTE — PROGRESS NOTES
[facility]  [address] [place], [address]  Ph [phone]  Fx [phone]    Physical Therapy Progress Note     Patient name: Enriqueta Horta Start of Care: 3/28/2022   Referral source: Lieutenant Rosibel Ascencio MD GLK: [id number]                Medical Diagnosis: Right hip pain [M25.551]    Onset Date:chronic, progressive                Treatment Diagnosis: right hip pain                                              ICD-10: M25.551   Prior Hospitalization: see medical history Provider#: [id number]   Medications: Verified on Patient summary List    Comorbidities: right knee meniscus debridement; depression, arthritis, hysterectomy x3 with last being complete hysterectomy 2008   Prior Level of Function: functionally independent, uses SPC for just shy of a year, active lifestyle     Visits from Start of Care: 8    Missed Visits: 0    Updated Goals/Measure of Progress: To be achieved in 4 weeks:    Short Term Goals: To be accomplished in 2 weeks:  1. Patient will be independent and compliant with HEP to progress toward goals and restore functional mobility. Eval Status: will issue HEP on first visit post op  Current: Patient reports daily compliance with her HEP.    4/11/22, met     2. Patient will improve FOTO score by 15 points to improve functional tolerance for exercise. Eval Status: KKZY 65  HPSNIKD:   66      5/46/11, in progress  FOTO score = an established functional score where 100 = no disability     3. Patient will improve pain in right hip to 5/10 at worst to improve standing and ambulation tolerance and restore prior level of function. Eval Status: 8/10 at worst  Progress note: 5/10 at worst 4/22/22     4. Pt will have painfree right hip AROM WFL to aid in functional mechanics for ambulation/ADLs. Eval Status:   Hip Left Right   Flexion 90 deg 63 deg P! Extension 0 deg 0 deg   ABD 25 deg 5 deg P! ER 20 deg 5 deg P! IR 15 deg 0 deg P!    Reassessment   Hip Left Right Flexion 90 deg 57 deg P! Extension 0 deg 0 deg   ABD 25 deg 9 deg P! ER 20 deg 5 deg P! IR 15 deg 0 deg P! Progress note: 4/22/22  Hip Left Right   Flexion 90 deg 90   Extension 0 deg 0 deg   ABD 25 deg 20 deg   ER 20 deg 10 deg P! IR 15 deg 0 deg         Long Term Goals: To be accomplished in 6 weeks:  1. Patient will improve FOTO score by 25 points to improve functional tolerance for walking for exercise and return to playing tennis. Eval Status: FOTO 26  Current:   50      4/13/22, in progress  FOTO score = an established functional score where 100 = no disability     2.   Pt will ambulate with no AD, step through gait pattern with equal stance time and stride length bilaterally and no evidence of trendelenberg in order to establish normal gait mechanics for walking and eventual return to participation in tennis. Eval Status: step-to gait with significant reliance on UE support with AD, right trendelenberg with decreased stephanie, decreased stance time on right LE, stride length about 1/2 on right as compared to left LE; uses SPC in left UE primarily; amb              Reassessment: step-to gait with significant reliance on UE support with AD, right trendelenberg with decreased stephanie, decreased stance time on right LE, stride length about 1/2 on right as compared to left LE; uses RW               Progress note: Step through gait using SPC, slight decreased stance time on right LE, good pairing of SPC with right stance phase. 4/22/22        3.   Pt will have 5/5 bilateral LE strength to return to goals of playing tennis and getting into/out of backtub without difficulty or pain.   Eval Status:   Hip Left (1-5) Right (1-5)   Hip Flexion 5 4   Hip Extension 5 4   Hip ABD 5 3+   Hip ADD 5 4   Hip ER 4+ 3+   Hip IR 4+ 3+      Knee Left (1-5) Right (1-5)   Knee Flexion 4+ 4   Knee Extension 4+ 4   Ankle PF 4+ 4+   Ankle DF 4+ 4+   Other       Reassessment: 4/1/22  Hip Left (1-5) Right (1-5)   Hip Flexion 5 2   Hip Extension 5 4   Hip ABD 5 2   Hip ADD 5 2   Hip ER 4+ 2   Hip IR 4+ 2      Knee Left (1-5) Right (1-5)   Knee Flexion 4+ 2+   Knee Extension 4+ 3   Ankle PF 4+ 4   Ankle DF 4+ 4+   Other        Progress note: 4/22/22  Hip Left (1-5) Right (1-5)   Hip Flexion 5 4+   Hip Extension 5 4   Hip ABD 5 3   Hip ADD 5 3   Hip ER 4+ 4   Hip IR 4+ 2      Knee Left (1-5) Right (1-5)   Knee Flexion 4+ 4   Knee Extension 4+ 4+   Ankle PF 4+ 4   Ankle DF 4+ 5   Other             4.   Patient will improve pain in right hip to 0/10 at worst to improve prolonged standing and ambulation tolerance and restore prior level of function. Eval Status: 8/10 at worst              Progress note: 5/10 at worst 4/22/22        Summary of Care/ Key Functional Changes:   ASSESSMENT/Changes in Function: Patient tolerated treatment session well today. Patient had no complaints with addition of step ups and YTB to 3 way hip to exercise program to accomplish improved LE strength and decreased pain . Pt is progressing well toward all goals. Pt has less pain with hip ROM and strength. Pt is improving with gait pattern with progression to [facility]. Patient continues to make steady progress toward goals and would benefit from continued skilled PT intervention to address remaining deficits outlined in goals below.     Patient will continue to benefit from skilled PT services to modify and progress therapeutic interventions, address functional mobility deficits, address ROM deficits, address strength deficits, analyze and address soft tissue restrictions, analyze and cue movement patterns, analyze and modify body mechanics/ergonomics, assess and modify postural abnormalities, address imbalance/dizziness and instruct in home and community integration to attain remaining goals.      ASSESSMENT/RECOMMENDATIONS:  [x]Continue therapy per initial plan/protocol at a frequency of  2 x per week for 4 weeks  []Continue therapy with the following recommended changes:_____________________ _____________________________ ________________________________________  []Discontinue therapy progressing towards or have reached established goals  []Discontinue therapy due to lack of appreciable progress towards goals  []Discontinue therapy due to lack of attendance or compliance  []Await Physician's recommendations/decisions regarding therapy  []Other:________________________________________________________________    Thank you for this referral.   Levi Serna, PT 4/22/2022 10:08 AM

## 2022-04-27 ENCOUNTER — HOSPITAL ENCOUNTER (OUTPATIENT)
Dept: PHYSICAL THERAPY | Age: 52
Discharge: HOME | End: 2022-04-27
Payer: COMMERCIAL

## 2022-04-27 PROCEDURE — 97110 THERAPEUTIC EXERCISES: CPT

## 2022-04-27 PROCEDURE — 97112 NEUROMUSCULAR REEDUCATION: CPT

## 2022-04-27 PROCEDURE — 97530 THERAPEUTIC ACTIVITIES: CPT

## 2022-04-27 NOTE — PROGRESS NOTES
PT DAILY TREATMENT NOTE    Patient Name: Sahra Calloway  Date:2022  : 1970  [x]  Patient  Verified  Payor: PRAVIN / Plan: Satya Carranza 74 / Product Type: [place] /    In time:930  Out time:100  Total Treatment Time (min): 38  Total Timed Codes (min): 38  1:1 Treatment Time (MC/BCBS only): 38   Visit #: 9 of 12    Treatment Dx: Right hip pain [M25.551]    SUBJECTIVE  Pain Level (0-10 scale): 3  Any medication changes, allergies to medications, adverse drug reactions, diagnosis change, or new procedure performed?: [x] No    [] Yes (see summary sheet for update)  Subjective functional status/changes:   [] No changes reported  Pt pleasant, reports she felt increased discomfort and soreness with bad weather yesterday. Advised use of tylenol prior. OBJECTIVE    15 min Therapeutic Exercise:  [x] See flow sheet :   Rationale: increase ROM and increase strength to improve the patients ability to restore PLOF    15 min Therapeutic Activity:  [x]  See flow sheet :   Rationale: increase strength and improve coordination  to improve the patients ability to restore PLOF      8 min Neuromuscular Re-education:  [x]  See flow sheet :   Rationale: improve coordination, improve balance and increase proprioception  to improve the patients ability to activate ankle stabilizers without compensation     With   [x] TE   [x] TA   [x] neuro   [] other: Patient Education: [x] Review HEP    [] Progressed/Changed HEP based on:   [] positioning   [] body mechanics   [] transfers   [] heat/ice application    [] other:      Other Objective/Functional Measures: NA     Pain Level (0-10 scale) post treatment: 4    ASSESSMENT/Changes in Function: Patient tolerated treatment session well today. Patient had no complaints with addition of SLS and bridges to exercise program to accomplish improved glute isolation.    Patient continues to make good progress toward goals and would benefit from continued skilled PT intervention to address remaining deficits outlined in goals below. Patient will continue to benefit from skilled PT services to modify and progress therapeutic interventions, address functional mobility deficits, address ROM deficits, address strength deficits, analyze and address soft tissue restrictions, analyze and cue movement patterns, analyze and modify body mechanics/ergonomics and assess and modify postural abnormalities to attain remaining goals. [x]  See Plan of Care  []  See progress note/recertification  []  See Discharge Summary         Progress towards goals / Updated goals:  Short Term Goals: To be accomplished in 2 weeks:  1. Patient will be independent and compliant with HEP to progress toward goals and restore functional mobility. Eval Status: will issue HEP on first visit post op  Current: Patient reports she is getting to HEP daily however she is sitting much more now that she is at work - advised that she get up every opportunity she has in order to decrease pain and stiffness 4/27/22     2. Patient will improve FOTO score by 15 points to improve functional tolerance for exercise. Eval Status: PLBO 84  YNNCOIY:   83      4/10/50, in progress  FOTO score = an established functional score where 100 = no disability     3. Patient will improve pain in right hip to 5/10 at worst to improve standing and ambulation tolerance and restore prior level of function. Eval Status: 8/10 at worst  Progress note: 5/10 at worst 4/22/22     4. Pt will have painfree right hip AROM WFL to aid in functional mechanics for ambulation/ADLs. Eval Status:   Hip Left Right   Flexion 90 deg 63 deg P! Extension 0 deg 0 deg   ABD 25 deg 5 deg P! ER 20 deg 5 deg P! IR 15 deg 0 deg P! Reassessment   Hip Left Right   Flexion 90 deg 57 deg P! Extension 0 deg 0 deg   ABD 25 deg 9 deg P! ER 20 deg 5 deg P! IR 15 deg 0 deg P!    Progress note: 4/22/22  Hip Left Right   Flexion 90 deg 90   Extension 0 deg 0 deg   ABD 25 deg 20 deg   ER 20 deg 10 deg P! IR 15 deg 0 deg         Long Term Goals: To be accomplished in 6 weeks:  1. Patient will improve FOTO score by 25 points to improve functional tolerance for walking for exercise and return to playing tennis. Eval Status: FOTO 26  Current:   50      4/13/22, in progress  FOTO score = an established functional score where 100 = no disability     2.   Pt will ambulate with no AD, step through gait pattern with equal stance time and stride length bilaterally and no evidence of trendelenberg in order to establish normal gait mechanics for walking and eventual return to participation in tennis. Eval Status: step-to gait with significant reliance on UE support with AD, right trendelenberg with decreased stephanie, decreased stance time on right LE, stride length about 1/2 on right as compared to left LE; uses SPC in left UE primarily; amb              Reassessment: step-to gait with significant reliance on UE support with AD, right trendelenberg with decreased stephanie, decreased stance time on right LE, stride length about 1/2 on right as compared to left LE; uses RW               Progress note: Step through gait using SPC, slight decreased stance time on right LE, good pairing of SPC with right stance phase.  4/22/22        3.   Pt will have 5/5 bilateral LE strength to return to goals of playing tennis and getting into/out of backtub without difficulty or pain.   Eval Status:   Hip Left (1-5) Right (1-5)   Hip Flexion 5 4   Hip Extension 5 4   Hip ABD 5 3+   Hip ADD 5 4   Hip ER 4+ 3+   Hip IR 4+ 3+      Knee Left (1-5) Right (1-5)   Knee Flexion 4+ 4   Knee Extension 4+ 4   Ankle PF 4+ 4+   Ankle DF 4+ 4+   Other       Reassessment: 4/1/22  Hip Left (1-5) Right (1-5)   Hip Flexion 5 2   Hip Extension 5 4   Hip ABD 5 2   Hip ADD 5 2   Hip ER 4+ 2   Hip IR 4+ 2      Knee Left (1-5) Right (1-5)   Knee Flexion 4+ 2+   Knee Extension 4+ 3   Ankle PF 4+ 4   Ankle DF 4+ 4+ Other        Progress note: 4/22/22  Hip Left (1-5) Right (1-5)   Hip Flexion 5 4+   Hip Extension 5 4   Hip ABD 5 3   Hip ADD 5 3   Hip ER 4+ 4   Hip IR 4+ 2      Knee Left (1-5) Right (1-5)   Knee Flexion 4+ 4   Knee Extension 4+ 4+   Ankle PF 4+ 4   Ankle DF 4+ 5   Other             4.   Patient will improve pain in right hip to 0/10 at worst to improve prolonged standing and ambulation tolerance and restore prior level of function.              Eval Status: 8/10 at worst              Progress note: 5/10 at worst 4/22/22           PLAN  []  Upgrade activities as tolerated     [x]  Continue plan of care  []  Update interventions per flow sheet       []  Discharge due to:_  []  Other:_      Juli Crawley PT 4/27/2022  9:38 AM    Future Appointments   Date Time Provider Ethan Aburto   4/29/2022  9:30 AM [place], Koreen Koyanagi [facility]   5/4/2022  3:30 PM Carolyn Landaverde, PT [facility]   5/6/2022  8:45 AM Leah Martinez, PT [facility]   5/11/2022  3:30 PM Carolyn Landaverde, PT [facility]   5/13/2022  3:30 PM Carolyn Landaverde, PT [facility]

## 2022-04-29 ENCOUNTER — HOSPITAL ENCOUNTER (OUTPATIENT)
Dept: PHYSICAL THERAPY | Age: 52
Discharge: HOME | End: 2022-04-29
Payer: COMMERCIAL

## 2022-04-29 PROCEDURE — 97112 NEUROMUSCULAR REEDUCATION: CPT

## 2022-04-29 PROCEDURE — 97110 THERAPEUTIC EXERCISES: CPT

## 2022-04-29 PROCEDURE — 97530 THERAPEUTIC ACTIVITIES: CPT

## 2022-04-29 NOTE — PROGRESS NOTES
PT DAILY TREATMENT NOTE    Patient Name: Rich Bourne   Date: 2022  : 1970  [x]  Patient  Verified  Payor:  / Plan: Satya Carranza 74 / Product Type:  /    In Time: 9:34  Out Time: 10:16  Total Treatment Time (min): 42  Total Timed Codes (min): 42  1:1 Treatment Time ( only): 42   Visit #: 10/16    Treatment Dx: Right hip pain [M25.551]    SUBJECTIVE  Pre-Treatment Pain Level (0-10 scale): 3    Any medication changes, allergies to medications, adverse drug reactions, diagnosis change, or new procedure performed?: [x] No    [] Yes (see summary sheet for update)    Subjective Functional Status/Changes:  [] No changes reported  Patient states her hip is \"feeling better and better, but I'm still unsure if I am glad I had the surgery done as of yet. Time will tell, but I am getting better. \"    OBJECTIVE     18 min Therapeutic Exercise:  [x] See flow sheet   Rationale: increase ROM, increase strength, and decrease pain to assist in improving patient's ability to perform functional activities and ADLs    11 min Therapeutic Activity:  [x] See flow sheet   Rationale: increase ROM, increase strength, and improve coordination to assist in improving patient's ability to perform functional activities and ADLs    13 min Neuromuscular Re-Education:  [x] See flow sheet   Rationale: improve coordination, improve balance/proprioception, improve posture, and improve core stabilization to assist in improving patient's ability to perform functional activities and ADLs as well as to improve core stabilization during static/dynamic movements      With   [x] TE   [] TA   [] neuro   [] other: Patient Education: [x] Review HEP    [] Progressed/Changed HEP based on:   [] positioning   [] body mechanics   [] transfers   [] heat/ice application    [] other:      Other Objective/Functional Measures: N/A     Pain Level (0-10 scale) Post Treatment: 0    ASSESSMENT/Changes in Function:  Patient responded well to today's treatment without any adverse reactions. Patient with much improving ability during sit <> stands for both concentric and eccentric control when rising/lowering to 19\" height seat. Minisquats are also improving, albeit she does still have some mild shifting to the left to offset her right hip, but this continues to improve. She was able to advance from 4\" to 6\" step during step ups with ease; will advance to step up and overs using 6\" step next visit. Patient will continue to benefit from skilled PT services to further modify and progress therapeutic interventions, address functional mobility deficits, address ROM deficits, address strength deficits, analyze and address soft tissue restrictions, analyze and cue movement patterns, analyze and modify body mechanics/ergonomics, assess and modify postural abnormalities, and instruct in home and community integration to attain remaining goals. [x]  See Plan of Care  []  See Progress Note/Recertification  []  See Discharge Summary         Progress Towards Goals/Updated Goals:    Short Term Goals: To be accomplished in 2 weeks:  1. Patient will be independent and compliant with HEP to progress toward goals and restore functional mobility. Eval Status: will issue HEP on first visit post op  Current: Patient reports she is getting to HEP daily however she is sitting much more now that she is at work - advised that she get up every opportunity she has in order to decrease pain and stiffness 4/27/22     2. Patient will improve FOTO score by 15 points to improve functional tolerance for exercise. Eval Status: BGOO 79  BCJUDSA:   91      9/52/24, in progress  FOTO score = an established functional score where 100 = no disability     3. Patient will improve pain in right hip to 5/10 at worst to improve standing and ambulation tolerance and restore prior level of function. Eval Status: 8/10 at worst  Progress note: 5/10 at worst 4/22/22     4.  Pt will have painfree right hip AROM WFL to aid in functional mechanics for ambulation/ADLs. Eval Status:   Hip Left Right   Flexion 90 deg 63 deg P! Extension 0 deg 0 deg   ABD 25 deg 5 deg P! ER 20 deg 5 deg P! IR 15 deg 0 deg P! Reassessment   Hip Left Right   Flexion 90 deg 57 deg P! Extension 0 deg 0 deg   ABD 25 deg 9 deg P! ER 20 deg 5 deg P! IR 15 deg 0 deg P! Progress note: 4/22/22  Hip Left Right   Flexion 90 deg 90   Extension 0 deg 0 deg   ABD 25 deg 20 deg   ER 20 deg 10 deg P! IR 15 deg 0 deg         Long Term Goals: To be accomplished in 6 weeks:  1. Patient will improve FOTO score by 25 points to improve functional tolerance for walking for exercise and return to playing tennis. Eval Status: FOTO 26  Current:   50      4/13/22, in progress  FOTO score = an established functional score where 100 = no disability     2.   Pt will ambulate with no AD, step through gait pattern with equal stance time and stride length bilaterally and no evidence of trendelenberg in order to establish normal gait mechanics for walking and eventual return to participation in tennis. Eval Status: step-to gait with significant reliance on UE support with AD, right trendelenberg with decreased stephanie, decreased stance time on right LE, stride length about 1/2 on right as compared to left LE; uses SPC in left UE primarily; amb              Reassessment: step-to gait with significant reliance on UE support with AD, right trendelenberg with decreased stephanie, decreased stance time on right LE, stride length about 1/2 on right as compared to left LE; uses RW               Progress note: Step through gait using SPC, slight decreased stance time on right LE, good pairing of SPC with right stance phase.  4/22/22        3.   Pt will have 5/5 bilateral LE strength to return to goals of playing tennis and getting into/out of backtub without difficulty or pain.   Eval Status:   Hip Left (1-5) Right (1-5)   Hip Flexion 5 4   Hip Extension 5 4   Hip ABD 5 3+   Hip ADD 5 4   Hip ER 4+ 3+   Hip IR 4+ 3+      Knee Left (1-5) Right (1-5)   Knee Flexion 4+ 4   Knee Extension 4+ 4   Ankle PF 4+ 4+   Ankle DF 4+ 4+   Other       Reassessment: 4/1/22  Hip Left (1-5) Right (1-5)   Hip Flexion 5 2   Hip Extension 5 4   Hip ABD 5 2   Hip ADD 5 2   Hip ER 4+ 2   Hip IR 4+ 2      Knee Left (1-5) Right (1-5)   Knee Flexion 4+ 2+   Knee Extension 4+ 3   Ankle PF 4+ 4   Ankle DF 4+ 4+   Other        Progress note: 4/22/22  Hip Left (1-5) Right (1-5)   Hip Flexion 5 4+   Hip Extension 5 4   Hip ABD 5 3   Hip ADD 5 3   Hip ER 4+ 4   Hip IR 4+ 2      Knee Left (1-5) Right (1-5)   Knee Flexion 4+ 4   Knee Extension 4+ 4+   Ankle PF 4+ 4   Ankle DF 4+ 5   Other             4.   Patient will improve pain in right hip to 0/10 at worst to improve prolonged standing and ambulation tolerance and restore prior level of function.              Eval Status: 8/10 at worst              Progress note: 5/10 at worst 4/22/22        PLAN  []  Upgrade Activities as Tolerated     [x]  Continue Plan of Care  []  Update Interventions per Flow Sheet       []  Discharge Due To:_  []  Other:_      Janiya Torres.  ARNOLDO Martinez, DPT, ATR  4/29/2022 8:02 AM    Future Appointments   Date Time Provider Ethan Aburto   4/29/2022  9:30 AM Nestor Martinez [facility]   5/4/2022  3:30 PM [facility]   5/6/2022  8:45 AM Nallely Martinez [facility]   5/11/2022  3:30 PM Andrew Angel PT [facility]   5/13/2022  3:30 PM Andrew Angel PT [facility]   5/16/2022  3:30 PM [facility]   5/18/2022  3:30 PM Andrew Angel, PT [facility]   5/23/2022  2:45 PM Andrew Angel, [address] [place] [address] [facility]   5/25/2022  3:30 PM Andrew Angel PT [facility]

## 2022-05-04 ENCOUNTER — HOSPITAL ENCOUNTER (OUTPATIENT)
Dept: PHYSICAL THERAPY | Age: 52
Discharge: HOME | End: 2022-05-04
Payer: COMMERCIAL

## 2022-05-04 PROCEDURE — 97530 THERAPEUTIC ACTIVITIES: CPT

## 2022-05-04 PROCEDURE — 97112 NEUROMUSCULAR REEDUCATION: CPT

## 2022-05-04 PROCEDURE — 97110 THERAPEUTIC EXERCISES: CPT

## 2022-05-04 NOTE — PROGRESS NOTES
PT DAILY TREATMENT NOTE    Patient Name: Min Jovel  MOLZ:[birth year]  : 1970  [x]  Patient  Verified  Payor:  / Plan: Satya Carranza 74 / Product Type:  /    In time:3:32  Out time:4:28   Total Treatment Time (min): 56  Total Timed Codes (min): 56  1:1 Treatment Time (MC/BCBS only): 56   Visit #: 11 of 16    Treatment Dx: Right hip pain [M25.551]    SUBJECTIVE  Pain Level (0-10 scale): 10  Any medication changes, allergies to medications, adverse drug reactions, diagnosis change, or new procedure performed?: [x] No    [] Yes (see summary sheet for update)  Subjective functional status/changes:   [] No changes reported  \"I just have a little pain right now but, not too bad. \"    OBJECTIVE      31 min Therapeutic Exercise:  [x] See flow sheet :   Rationale: increase ROM, increase strength and improve coordination to improve the patients ability to return to prior level of physical activity. 15 min Therapeutic Activity:  [x]  See flow sheet :   Rationale: increase ROM, increase strength and improve coordination  to improve the patients ability to return to prior level of physical activity. 10 min Neuromuscular Re-education:  [x]  See flow sheet :   Rationale: increase ROM, increase strength and improve coordination  to improve the patients ability to return to prior level of physical activity. With   [] TE   [] TA   [] neuro   [] other: Patient Education: [x] Review HEP    [] Progressed/Changed HEP based on:   [] positioning   [] body mechanics   [] transfers   [] heat/ice application    [] other:      Other Objective/Functional Measures:      Pain Level (0-10 scale) post treatment: 4/10    ASSESSMENT/Changes in Function: Pt tolerated session moderately well. Pt demonstrated difficulty with standing therex but, not above tolerance. Pt did repot increased pain with S/L clams with Right LE but, was able to perform single set.  Pt unable to lie on affected side due to sensitivity. Pt reported slight increased pain post tx. Patient will continue to benefit from skilled PT services to modify and progress therapeutic interventions, address functional mobility deficits, address ROM deficits, address strength deficits, analyze and address soft tissue restrictions, analyze and cue movement patterns, analyze and modify body mechanics/ergonomics and assess and modify postural abnormalities to attain remaining goals. [x]  See Plan of Care  []  See progress note/recertification  []  See Discharge Summary         Progress towards goals / Updated goals:  Short Term Goals: To be accomplished in 2 weeks:  1. Patient will be independent and compliant with HEP to progress toward goals and restore functional mobility. Eval Status: will issue HEP on first visit post op  Current: Patient reports she is getting to HEP daily however she is sitting much more now that she is at work - advised that she get up every opportunity she has in order to decrease pain and stiffness 4/27/22     2. Patient will improve FOTO score by 15 points to improve functional tolerance for exercise. Eval Status: FBNY 29  MQFYGDE:   14      1/28/48, in progress  FOTO score = an established functional score where 100 = no disability     3. Patient will improve pain in right hip to 5/10 at worst to improve standing and ambulation tolerance and restore prior level of function. Eval Status: 8/10 at worst  Progress note: 5/10 at worst 4/22/22     4. Pt will have painfree right hip AROM WFL to aid in functional mechanics for ambulation/ADLs. Eval Status:   Hip Left Right   Flexion 90 deg 63 deg P! Extension 0 deg 0 deg   ABD 25 deg 5 deg P! ER 20 deg 5 deg P! IR 15 deg 0 deg P! Reassessment   Hip Left Right   Flexion 90 deg 57 deg P! Extension 0 deg 0 deg   ABD 25 deg 9 deg P! ER 20 deg 5 deg P! IR 15 deg 0 deg P!    Progress note: 4/22/22  Hip Left Right   Flexion 90 deg 90   Extension 0 deg 0 deg ABD 25 deg 20 deg   ER 20 deg 10 deg P! IR 15 deg 0 deg         Long Term Goals: To be accomplished in 6 weeks:  1. Patient will improve FOTO score by 25 points to improve functional tolerance for walking for exercise and return to playing tennis. Eval Status: FOTO 26  Last PN:   50      4/13/22, in progress  Current: will complete on visit 12+ due to therapist error 5/4/22  FOTO score = an established functional score where 100 = no disability     2.   Pt will ambulate with no AD, step through gait pattern with equal stance time and stride length bilaterally and no evidence of trendelenberg in order to establish normal gait mechanics for walking and eventual return to participation in tennis. Eval Status: step-to gait with significant reliance on UE support with AD, right trendelenberg with decreased stephanie, decreased stance time on right LE, stride length about 1/2 on right as compared to left LE; uses SPC in left UE primarily; amb              Reassessment: step-to gait with significant reliance on UE support with AD, right trendelenberg with decreased stephanie, decreased stance time on right LE, stride length about 1/2 on right as compared to left LE; uses RW               Progress note: Step through gait using SPC, slight decreased stance time on right LE, good pairing of SPC with right stance phase.  4/22/22        3.   Pt will have 5/5 bilateral LE strength to return to goals of playing tennis and getting into/out of backtub without difficulty or pain.   Eval Status:   Hip Left (1-5) Right (1-5)   Hip Flexion 5 4   Hip Extension 5 4   Hip ABD 5 3+   Hip ADD 5 4   Hip ER 4+ 3+   Hip IR 4+ 3+      Knee Left (1-5) Right (1-5)   Knee Flexion 4+ 4   Knee Extension 4+ 4   Ankle PF 4+ 4+   Ankle DF 4+ 4+   Other       Reassessment: 4/1/22  Hip Left (1-5) Right (1-5)   Hip Flexion 5 2   Hip Extension 5 4   Hip ABD 5 2   Hip ADD 5 2   Hip ER 4+ 2   Hip IR 4+ 2      Knee Left (1-5) Right (1-5)   Knee Flexion 4+ 2+ Knee Extension 4+ 3   Ankle PF 4+ 4   Ankle DF 4+ 4+   Other        Progress note: 4/22/22  Hip Left (1-5) Right (1-5)   Hip Flexion 5 4+   Hip Extension 5 4   Hip ABD 5 3   Hip ADD 5 3   Hip ER 4+ 4   Hip IR 4+ 2      Knee Left (1-5) Right (1-5)   Knee Flexion 4+ 4   Knee Extension 4+ 4+   Ankle PF 4+ 4   Ankle DF 4+ 5   Other             4.   Patient will improve pain in right hip to 0/10 at worst to improve prolonged standing and ambulation tolerance and restore prior level of function.              Eval Status: 8/10 at worst              Progress note: 5/10 at worst 4/22/22        PLAN  [x]  Upgrade activities as tolerated     [x]  Continue plan of care  []  Update interventions per flow sheet       []  Discharge due to:_  []  Other:_      Jacki Emlore, FAYE 5/4/2022  3:41 PM    Future Appointments   Date Time Provider Ethan Aburto   5/6/2022  8:45 AM Shae Martinez [facility]   5/11/2022  3:30 PM Cassie Poole PT [facility]   5/13/2022  3:30 PM Cassie Poole [facility]   5/16/2022  3:30 PM [facility]   5/18/2022  3:30 PM Cassie Poole PT [facility]   5/23/2022  5:00 PM Amanda Martinez [facility]   5/25/2022  3:30 PM Cassie Poole [facility]

## 2022-05-06 ENCOUNTER — HOSPITAL ENCOUNTER (OUTPATIENT)
Dept: PHYSICAL THERAPY | Age: 52
Discharge: HOME | End: 2022-05-06
Payer: COMMERCIAL

## 2022-05-06 PROCEDURE — 97110 THERAPEUTIC EXERCISES: CPT

## 2022-05-06 PROCEDURE — 97112 NEUROMUSCULAR REEDUCATION: CPT

## 2022-05-06 PROCEDURE — 97530 THERAPEUTIC ACTIVITIES: CPT

## 2022-05-06 NOTE — PROGRESS NOTES
PT DAILY TREATMENT NOTE    Patient Name: Jose A Georges  Date: 2022  : 1970  [x]  Patient  Verified  Payor:  / Plan: Satya Carranza 76 / Product Type:  /    In Time: 8:42  Out Time: 9:38  Total Treatment Time (min): 56  Total Timed Codes (min): 56  1:1 Treatment Time ( [address] only): 64   Visit #:     Treatment Dx: Right hip pain [M25.551]    SUBJECTIVE  Pre-Treatment Pain Level (0-10 scale): 3    Any medication changes, allergies to medications, adverse drug reactions, diagnosis change, or new procedure performed?: [x] No    [] Yes (see summary sheet for update)    Subjective Functional Status/Changes:  [] No changes reported  \"My hip tends to hurt most when I sit for longer than an hour, so basically when I'm at work. I've been getting up and walking around though, so that has been helping. \"  Patient states she is very eager to get back to driving, but she states the doctor told her she has to wait until she is 6 weeks post-op.      OBJECTIVE    11 min Therapeutic Exercise:  [x] See flow sheet   Rationale: increase ROM, increase strength, and decrease pain to assist in improving patient's ability to perform functional activities and ADLs    14 min Therapeutic Activity:  [x] See flow sheet   Rationale: increase ROM, increase strength, and improve coordination to assist in improving patient's ability to perform functional activities and ADLs    23 min Neuromuscular Re-Education:  [x] See flow sheet   Rationale: improve coordination, improve balance/proprioception, improve posture, and improve core stabilization to assist in improving patient's ability to perform functional activities and ADLs as well as to improve core stabilization during static/dynamic movements    8 min Gait Training:  _525__ feet with _SPC__ device on level surfaces with __supervision_ level of assistance   Rationale: improve ambulation safety and efficiency in order to improve patient's ability to safely ambulate at home for self care      With   [x] TE   [] TA   [] neuro   [] other: Patient Education: [x] Review HEP    [] Progressed/Changed HEP based on:   [] positioning   [] body mechanics   [] transfers   [] heat/ice application    [] other:      Other Objective/Functional Measures:  TUG test: 14\" with SPC, 16\" without AD ; 30\" STS test: 12 reps without arms       Pain Level (0-10 scale) Post Treatment: 3    ASSESSMENT/Changes in Function:  Patient responded well to today's treatment without any adverse reactions. Patient's scores on TUG and 30\" STS indicates she is within a lower risk of falls. Gait with SPC is starting to normalize, albeit she does still have some antalgia during the transition of weight through her right LE/stance time that tends to increase by the conclusion of treatment. Patient will continue to benefit from skilled PT services to further modify and progress therapeutic interventions, address functional mobility deficits, address ROM deficits, address strength deficits, analyze and address soft tissue restrictions, analyze and cue movement patterns, analyze and modify body mechanics/ergonomics, assess and modify postural abnormalities, and instruct in home and community integration to attain remaining goals. [x]  See Plan of Care  []  See Progress Note/Recertification  []  See Discharge Summary         Progress Towards Goals/Updated Goals:    Short Term Goals: To be accomplished in 2 weeks:  1. Patient will be independent and compliant with HEP to progress toward goals and restore functional mobility. Eval Status: will issue HEP on first visit post op  Current: Patient reports she is getting to HEP daily however she is sitting much more now that she is at work - advised that she get up every opportunity she has in order to decrease pain and stiffness 4/27/22     2. Patient will improve FOTO score by 15 points to improve functional tolerance for exercise.    Eval Status: CIBU 08  BPOVVIC:   68      8/1/38, in progress  FOTO score = an established functional score where 100 = no disability     3. Patient will improve pain in right hip to 5/10 at worst to improve standing and ambulation tolerance and restore prior level of function. Eval Status: 8/10 at worst  Progress note: 5/10 at worst 4/22/22  Current: pain ranges from 0-4/10, patient states most of her pain occurs when sitting > 1 hour    5/6/22, met     4. Pt will have painfree right hip AROM WFL to aid in functional mechanics for ambulation/ADLs. Eval Status:   Hip Left Right   Flexion 90 deg 63 deg P! Extension 0 deg 0 deg   ABD 25 deg 5 deg P! ER 20 deg 5 deg P! IR 15 deg 0 deg P! Reassessment   Hip Left Right   Flexion 90 deg 57 deg P! Extension 0 deg 0 deg   ABD 25 deg 9 deg P! ER 20 deg 5 deg P! IR 15 deg 0 deg P! Progress note: 4/22/22  Hip Left Right   Flexion 90 deg 90   Extension 0 deg 0 deg   ABD 25 deg 20 deg   ER 20 deg 10 deg P! IR 15 deg 0 deg         Long Term Goals: To be accomplished in 6 weeks:  1. Patient will improve FOTO score by 25 points to improve functional tolerance for walking for exercise and return to playing tennis. Eval Status: OLXZ 29  Current: 49     5/6/22, in progress  FOTO score = an established functional score where 100 = no disability     2.   Pt will ambulate with no AD, step through gait pattern with equal stance time and stride length bilaterally and no evidence of trendelenberg in order to establish normal gait mechanics for walking and eventual return to participation in tennis.   Eval Status: step-to gait with significant reliance on UE support with AD, right trendelenberg with decreased stephanie, decreased stance time on right LE, stride length about 1/2 on right as compared to left LE; uses SPC in left UE primarily; amb              Reassessment: step-to gait with significant reliance on UE support with AD, right trendelenberg with decreased stephanie, decreased stance time on right LE, stride length about 1/2 on right as compared to left LE; uses RW               Progress note: Step through gait using SPC, slight decreased stance time on right LE, good pairing of SPC with right stance phase.  4/22/22        3.   Pt will have 5/5 bilateral LE strength to return to goals of playing tennis and getting into/out of backtub without difficulty or pain. Eval Status:   Hip Left (1-5) Right (1-5)   Hip Flexion 5 4   Hip Extension 5 4   Hip ABD 5 3+   Hip ADD 5 4   Hip ER 4+ 3+   Hip IR 4+ 3+      Knee Left (1-5) Right (1-5)   Knee Flexion 4+ 4   Knee Extension 4+ 4   Ankle PF 4+ 4+   Ankle DF 4+ 4+   Other       Reassessment: 4/1/22  Hip Left (1-5) Right (1-5)   Hip Flexion 5 2   Hip Extension 5 4   Hip ABD 5 2   Hip ADD 5 2   Hip ER 4+ 2   Hip IR 4+ 2      Knee Left (1-5) Right (1-5)   Knee Flexion 4+ 2+   Knee Extension 4+ 3   Ankle PF 4+ 4   Ankle DF 4+ 4+   Other        Progress note: 4/22/22  Hip Left (1-5) Right (1-5)   Hip Flexion 5 4+   Hip Extension 5 4   Hip ABD 5 3   Hip ADD 5 3   Hip ER 4+ 4   Hip IR 4+ 2      Knee Left (1-5) Right (1-5)   Knee Flexion 4+ 4   Knee Extension 4+ 4+   Ankle PF 4+ 4   Ankle DF 4+ 5   Other             4.   Patient will improve pain in right hip to 0/10 at worst to improve prolonged standing and ambulation tolerance and restore prior level of function.              Eval Status: 8/10 at worst              Progress note: 5/10 at worst 4/22/22   Current: pain ranges from 0-4/10, patient states most of her pain occurs when sitting > 1 hour    5/6/22, met        PLAN  []  Upgrade Activities as Tolerated     [x]  Continue Plan of Care  []  Update Interventions per Flow Sheet       []  Discharge Due To:_  []  Other:_      Jacklyn Pain.  Juan, PT, DPT, ATR  5/6/2022 7:51 AM    Future Appointments   Date Time Provider Ethan Aburto   5/6/2022  8:45 AM Talita Costa [facility]   5/11/2022  3:30 PM [facility], [address] THE [facility] 5/13/2022  3:30 PM Khurram Servin, PT [email or web address]   5/16/2022  3:30 PM Brad Barron [email or web address]   5/18/2022  3:30 PM Khurram Servin, PT [email or web address]   5/23/2022  5:00 PM Sobia Yeager, PT [email or web address]   5/25/2022  3:30 PM Khurram Servin, [facility] THE [facility]

## 2022-05-11 ENCOUNTER — HOSPITAL ENCOUNTER (OUTPATIENT)
Dept: PHYSICAL THERAPY | Age: 52
Discharge: HOME | End: 2022-05-11
Payer: COMMERCIAL

## 2022-05-11 PROCEDURE — 97530 THERAPEUTIC ACTIVITIES: CPT

## 2022-05-11 PROCEDURE — 97112 NEUROMUSCULAR REEDUCATION: CPT

## 2022-05-11 PROCEDURE — 97110 THERAPEUTIC EXERCISES: CPT

## 2022-05-11 NOTE — PROGRESS NOTES
PT DAILY TREATMENT NOTE    Patient Name: Albertina Pettit  Date:2022  : 1970  [x]  Patient  Verified  Payor:  / Plan: [facility]  [facility] / Product Type:  /    In time:332  Out time:415  Total Treatment Time (min): 43  Total Timed Codes (min): 43  1:1 Treatment Time (MC/BCBS only): 43   Visit #: 13 of 16    Treatment Dx: Right hip pain [M25.551]    SUBJECTIVE  Pain Level (0-10 scale): 2.5/10  Any medication changes, allergies to medications, adverse drug reactions, diagnosis change, or new procedure performed?: [x] No    [] Yes (see summary sheet for update)  Subjective functional status/changes:   [] No changes reported  Pt reported that she continues to have pain when she stands along her incision. OBJECTIVE    15 min Therapeutic Exercise:  [x] See flow sheet :   Rationale: increase ROM, increase strength, improve coordination, improve balance and increase proprioception to improve the patients ability to restore PLOF    15 min Therapeutic Activity:  [x]  See flow sheet :   Rationale: increase ROM, increase strength, improve coordination, improve balance and increase proprioception  to improve the patients ability to complete transfers and ADLs without external assist     13 min Neuromuscular Re-education:  [x]  See flow sheet :   Rationale: increase ROM, increase strength, improve coordination, improve balance and increase proprioception  to improve the patients ability to activate ankle and hip stabilizers without compensation    With   [x] TE   [x] TA   [x] neuro   [] other: Patient Education: [x] Review HEP    [] Progressed/Changed HEP based on:   [] positioning   [] body mechanics   [] transfers   [] heat/ice application    [] other:      Pain Level (0-10 scale) post treatment: 3/10    ASSESSMENT/Changes in Function: Patient tolerated treatment session well  today. Patient continues to complain of pain with standing along incision line.   Patient continues to make steady progress toward goals and would benefit from continued skilled PT intervention to address remaining deficits outlined in goals below. Patient will continue to benefit from skilled PT services to modify and progress therapeutic interventions, address functional mobility deficits, address ROM deficits, address strength deficits, analyze and address soft tissue restrictions, analyze and cue movement patterns, analyze and modify body mechanics/ergonomics, assess and modify postural abnormalities, address imbalance/dizziness and instruct in home and community integration to attain remaining goals. [x]  See Plan of Care  []  See progress note/recertification  []  See Discharge Summary         Progress towards goals / Updated goals:  Short Term Goals: To be accomplished in 2 weeks:  1. Patient will be independent and compliant with HEP to progress toward goals and restore functional mobility. Eval Status: will issue HEP on first visit post op  Current: Patient reports that she is getting up more since going back to work but continues to feel stiff in AM.  5/11/22     2. Patient will improve FOTO score by 15 points to improve functional tolerance for exercise. Eval Status: MPKQ 40  MAWJVSB:   92      0/9/28, in progress  FOTO score = an established functional score where 100 = no disability     3. Patient will improve pain in right hip to 5/10 at worst to improve standing and ambulation tolerance and restore prior level of function. Eval Status: 8/10 at worst  Progress note: 5/10 at worst 4/22/22  Current: pain ranges from 0-4/10, patient states most of her pain occurs when sitting > 1 hour    5/6/22, met     4. Pt will have painfree right hip AROM WFL to aid in functional mechanics for ambulation/ADLs. Eval Status:   Hip Left Right   Flexion 90 deg 63 deg P! Extension 0 deg 0 deg   ABD 25 deg 5 deg P! ER 20 deg 5 deg P! IR 15 deg 0 deg P! Reassessment   Hip Left Right   Flexion 90 deg 57 deg P! Extension 0 deg 0 deg   ABD 25 deg 9 deg P! ER 20 deg 5 deg P! IR 15 deg 0 deg P! Progress note: 4/22/22  Hip Left Right   Flexion 90 deg 90   Extension 0 deg 0 deg   ABD 25 deg 20 deg   ER 20 deg 10 deg P! IR 15 deg 0 deg         Long Term Goals: To be accomplished in 6 weeks:  1. Patient will improve FOTO score by 25 points to improve functional tolerance for walking for exercise and return to playing tennis. Eval Status: KFCS 78  Current: 49     5/6/22, in progress  FOTO score = an established functional score where 100 = no disability     2.   Pt will ambulate with no AD, step through gait pattern with equal stance time and stride length bilaterally and no evidence of trendelenberg in order to establish normal gait mechanics for walking and eventual return to participation in tennis. Eval Status: step-to gait with significant reliance on UE support with AD, right trendelenberg with decreased stephanie, decreased stance time on right LE, stride length about 1/2 on right as compared to left LE; uses SPC in left UE primarily; amb              Reassessment: step-to gait with significant reliance on UE support with AD, right trendelenberg with decreased stephanie, decreased stance time on right LE, stride length about 1/2 on right as compared to left LE; uses RW               Progress note: Step through gait using SPC, slight decreased stance time on right LE, good pairing of SPC with right stance phase.  4/22/22        3.   Pt will have 5/5 bilateral LE strength to return to goals of playing tennis and getting into/out of backtub without difficulty or pain.   Eval Status:   Hip Left (1-5) Right (1-5)   Hip Flexion 5 4   Hip Extension 5 4   Hip ABD 5 3+   Hip ADD 5 4   Hip ER 4+ 3+   Hip IR 4+ 3+      Knee Left (1-5) Right (1-5)   Knee Flexion 4+ 4   Knee Extension 4+ 4   Ankle PF 4+ 4+   Ankle DF 4+ 4+   Other       Reassessment: 4/1/22  Hip Left (1-5) Right (1-5)   Hip Flexion 5 2   Hip Extension 5 4   Hip ABD 5 2   Hip ADD 5 2   Hip ER 4+ 2   Hip IR 4+ 2      Knee Left (1-5) Right (1-5)   Knee Flexion 4+ 2+   Knee Extension 4+ 3   Ankle PF 4+ 4   Ankle DF 4+ 4+   Other        Progress note: 4/22/22  Hip Left (1-5) Right (1-5)   Hip Flexion 5 4+   Hip Extension 5 4   Hip ABD 5 3   Hip ADD 5 3   Hip ER 4+ 4   Hip IR 4+ 2      Knee Left (1-5) Right (1-5)   Knee Flexion 4+ 4   Knee Extension 4+ 4+   Ankle PF 4+ 4   Ankle DF 4+ 5   Other             4.   Patient will improve pain in right hip to 0/10 at worst to improve prolonged standing and ambulation tolerance and restore prior level of function.              Eval Status: 8/10 at worst              Progress note: 5/10 at worst 4/22/22              Current: pain ranges from 0-4/10, patient states most of her pain occurs when sitting > 1 hour    5/6/22, met     PLAN  []  Upgrade activities as tolerated     [x]  Continue plan of care  []  Update interventions per flow sheet       []  Discharge due to:_  []  Other:_      Frankie Pagan, PT 5/11/2022  3:37 PM    Future Appointments   Date Time Provider Ethan Aburto   5/13/2022  3:30 PM Deandre John, [address] [facility] THE [facility]   5/16/2022  3:30 PM Armani Will [facility]   5/18/2022  3:30 PM Deandre John, PT [facility]   5/23/2022  5:00 PM Tomasz Martinez, PT [facility]   5/25/2022  3:30 PM Deandre John, PT [facility]

## 2022-05-13 ENCOUNTER — HOSPITAL ENCOUNTER (OUTPATIENT)
Dept: PHYSICAL THERAPY | Age: 52
Discharge: HOME | End: 2022-05-13
Payer: COMMERCIAL

## 2022-05-13 PROCEDURE — 97530 THERAPEUTIC ACTIVITIES: CPT

## 2022-05-13 PROCEDURE — 97112 NEUROMUSCULAR REEDUCATION: CPT

## 2022-05-13 PROCEDURE — 97110 THERAPEUTIC EXERCISES: CPT

## 2022-05-13 NOTE — PROGRESS NOTES
PT DAILY TREATMENT NOTE    Patient Name: Donald Pinto  Date:2022  : 1970  [x]  Patient  Verified  Payor:  / Plan: [facility]  [facility] REGION / Product Type:  /    In time:330  Out time:413  Total Treatment Time (min): 43  Total Timed Codes (min): 43  1:1 Treatment Time (MC/BCBS only): 43   Visit #: 14 of 16    Treatment Dx: Right hip pain [M25.551]    SUBJECTIVE  Pain Level (0-10 scale): 3/10  Any medication changes, allergies to medications, adverse drug reactions, diagnosis change, or new procedure performed?: [x] No    [] Yes (see summary sheet for update)  Subjective functional status/changes:   [x] No changes reported  Pt reported feeling the same. OBJECTIVE    15 min Therapeutic Exercise:  [x] See flow sheet :   Rationale: increase ROM, increase strength, improve coordination, improve balance and increase proprioception to improve the patients ability to restore PLOF    15 min Therapeutic Activity:  [x]  See flow sheet :   Rationale: increase ROM, increase strength, improve coordination, improve balance and increase proprioception  to improve the patients ability to complete transfers and ADLs without external assist     13 min Neuromuscular Re-education:  [x]  See flow sheet :   Rationale: increase ROM, increase strength, improve coordination, improve balance and increase proprioception  to improve the patients ability to activate ankle and hip stabilizers without compensation      With   [x] TE   [x] TA   [x] neuro   [] other: Patient Education: [x] Review HEP    [] Progressed/Changed HEP based on:   [] positioning   [] body mechanics   [] transfers   [] heat/ice application    [] other:        Pain Level (0-10 scale) post treatment: 2.5/10    ASSESSMENT/Changes in Function: Patient tolerated treatment session well today. Patient had no complaints with addition of OTB to 3 way hip to exercise program to accomplish improve hip strength.   Patient continues to make steady progress toward goals and would benefit from continued skilled PT intervention to address remaining deficits outlined in goals below. Patient will continue to benefit from skilled PT services to modify and progress therapeutic interventions, address functional mobility deficits, address ROM deficits, address strength deficits, analyze and address soft tissue restrictions, analyze and cue movement patterns, analyze and modify body mechanics/ergonomics, assess and modify postural abnormalities, address imbalance/dizziness and instruct in home and community integration to attain remaining goals. [x]  See Plan of Care  []  See progress note/recertification  []  See Discharge Summary         Progress towards goals / Updated goals:  Short Term Goals: To be accomplished in 2 weeks:  1. Patient will be independent and compliant with HEP to progress toward goals and restore functional mobility. Eval Status: will issue HEP on first visit post op  Current: Patient reports that she is getting up more since going back to work but continues to feel stiff in AM.  5/11/22     2. Patient will improve FOTO score by 15 points to improve functional tolerance for exercise. Eval Status: QKOL 83  RXCIFSB:   60      4/0/23, in progress  FOTO score = an established functional score where 100 = no disability     3. Patient will improve pain in right hip to 5/10 at worst to improve standing and ambulation tolerance and restore prior level of function. Eval Status: 8/10 at worst  Progress note: 5/10 at worst 4/22/22  Current: pain ranges from 0-4/10, patient states most of her pain occurs when sitting > 1 hour    5/6/22, met     4. Pt will have painfree right hip AROM WFL to aid in functional mechanics for ambulation/ADLs. Eval Status:   Hip Left Right   Flexion 90 deg 63 deg P! Extension 0 deg 0 deg   ABD 25 deg 5 deg P! ER 20 deg 5 deg P! IR 15 deg 0 deg P! Reassessment   Hip Left Right   Flexion 90 deg 57 deg P! Extension 0 deg 0 deg   ABD 25 deg 9 deg P! ER 20 deg 5 deg P! IR 15 deg 0 deg P! Progress note: 4/22/22  Hip Left Right   Flexion 90 deg 90   Extension 0 deg 0 deg   ABD 25 deg 20 deg   ER 20 deg 10 deg P! IR 15 deg 0 deg         Long Term Goals: To be accomplished in 6 weeks:  1. Patient will improve FOTO score by 25 points to improve functional tolerance for walking for exercise and return to playing tennis. Eval Status: FOTO 26  Current: 49     5/6/22, in progress  FOTO score = an established functional score where 100 = no disability     2.   Pt will ambulate with no AD, step through gait pattern with equal stance time and stride length bilaterally and no evidence of trendelenberg in order to establish normal gait mechanics for walking and eventual return to participation in tennis. Eval Status: step-to gait with significant reliance on UE support with AD, right trendelenberg with decreased stephanie, decreased stance time on right LE, stride length about 1/2 on right as compared to left LE; uses SPC in left UE primarily; amb              Reassessment: step-to gait with significant reliance on UE support with AD, right trendelenberg with decreased stephanie, decreased stance time on right LE, stride length about 1/2 on right as compared to left LE; uses RW               Progress note: Step through gait using SPC, slight decreased stance time on right LE, good pairing of SPC with right stance phase.  4/22/22        3.   Pt will have 5/5 bilateral LE strength to return to goals of playing tennis and getting into/out of backtub without difficulty or pain.   Eval Status:   Hip Left (1-5) Right (1-5)   Hip Flexion 5 4   Hip Extension 5 4   Hip ABD 5 3+   Hip ADD 5 4   Hip ER 4+ 3+   Hip IR 4+ 3+      Knee Left (1-5) Right (1-5)   Knee Flexion 4+ 4   Knee Extension 4+ 4   Ankle PF 4+ 4+   Ankle DF 4+ 4+   Other       Reassessment: 4/1/22  Hip Left (1-5) Right (1-5)   Hip Flexion 5 2   Hip Extension 5 4   Hip ABD 5 2   Hip ADD 5 2   Hip ER 4+ 2   Hip IR 4+ 2      Knee Left (1-5) Right (1-5)   Knee Flexion 4+ 2+   Knee Extension 4+ 3   Ankle PF 4+ 4   Ankle DF 4+ 4+   Other        Progress note: 4/22/22  Hip Left (1-5) Right (1-5)   Hip Flexion 5 4+   Hip Extension 5 4   Hip ABD 5 3   Hip ADD 5 3   Hip ER 4+ 4   Hip IR 4+ 2      Knee Left (1-5) Right (1-5)   Knee Flexion 4+ 4   Knee Extension 4+ 4+   Ankle PF 4+ 4   Ankle DF 4+ 5   Other             4.   Patient will improve pain in right hip to 0/10 at worst to improve prolonged standing and ambulation tolerance and restore prior level of function.              Eval Status: 8/10 at worst              Progress note: 5/10 at worst 4/22/22              Current: 3/10 at worst 5/13/22      PLAN  [x]  Upgrade activities as tolerated     [x]  Continue plan of care  [x]  Update interventions per flow sheet       []  Discharge due to:_  []  Other:_      Aretha Reynoso PT 5/13/2022  1:30 PM    Future Appointments   Date Time Provider Ethan Aburto   5/13/2022  3:30 PM Romaine Miranda, [address] [facility]   5/16/2022  3:30 PM [facility]   5/18/2022  3:30 PM Romaine Miranda PT [facility]   5/23/2022  5:00 PM Dhara Martniez PT [facility]   5/25/2022  3:30 PM Romaine Miranda PT [facility]

## 2022-05-16 ENCOUNTER — HOSPITAL ENCOUNTER (OUTPATIENT)
Dept: PHYSICAL THERAPY | Age: 52
Discharge: HOME | End: 2022-05-16
Payer: COMMERCIAL

## 2022-05-16 PROCEDURE — 97530 THERAPEUTIC ACTIVITIES: CPT

## 2022-05-16 PROCEDURE — 97110 THERAPEUTIC EXERCISES: CPT

## 2022-05-16 PROCEDURE — 97112 NEUROMUSCULAR REEDUCATION: CPT

## 2022-05-16 NOTE — PROGRESS NOTES
PT DAILY TREATMENT NOTE    Patient Name: Deidra Champion  Date:2022  : 1970  [x]  Patient  Verified  Payor:  / Plan: [facility] REGION / Product Type:  /    In time:3:36  Out time:4:15  Total Treatment Time (min): 39  Total Timed Codes (min): 39  1:1 Treatment Time (MC/BCBS only): 39   Visit #: 15 of 16    Treatment Dx: Right hip pain [M25.551]    SUBJECTIVE  Pain Level (0-10 scale): 210  Any medication changes, allergies to medications, adverse drug reactions, diagnosis change, or new procedure performed?: [x] No    [] Yes (see summary sheet for update)  Subjective functional status/changes:   [] No changes reported  \"I just have some pain right now. \"    OBJECTIVE      19 min Therapeutic Exercise:  [x] See flow sheet :   Rationale: increase ROM, increase strength and improve coordination to improve the patients ability to return to prior level of physical activity. 10 min Therapeutic Activity:  [x]  See flow sheet :   Rationale: increase ROM, increase strength and improve coordination  to improve the patients ability to return to prior level of physical activity. 10 min Neuromuscular Re-education:  [x]  See flow sheet :   Rationale: increase ROM, increase strength and improve coordination  to improve the patients ability to return to prior level of physical activity. With   [] TE   [] TA   [] neuro   [] other: Patient Education: [x] Review HEP    [] Progressed/Changed HEP based on:   [] positioning   [] body mechanics   [] transfers   [] heat/ice application    [] other:      Other Objective/Functional Measures:      Pain Level (0-10 scale) post treatment: 3/10    ASSESSMENT/Changes in Function: Pt reported slight pain upon arriving in clinic. Pt reported performing lots of walking today. Pt was able to tolerate therex with some discomfort but, not above tolerance.  Pt continues to perform all therex within Posterior Hip precautions established by surgeon at this time. Patient will continue to benefit from skilled PT services to modify and progress therapeutic interventions, address functional mobility deficits, address ROM deficits, address strength deficits, analyze and address soft tissue restrictions, analyze and cue movement patterns, analyze and modify body mechanics/ergonomics and assess and modify postural abnormalities to attain remaining goals. [x]  See Plan of Care  []  See progress note/recertification  []  See Discharge Summary         Progress towards goals / Updated goals:  Short Term Goals: To be accomplished in 2 weeks:  1. Patient will be independent and compliant with HEP to progress toward goals and restore functional mobility. Eval Status: will issue HEP on first visit post op  Current: Pt reports continued daily compliance 5/16/22     2. Patient will improve FOTO score by 15 points to improve functional tolerance for exercise. Eval Status: VKNF 86  VUWMYNF:   27      5/5/76, in progress  FOTO score = an established functional score where 100 = no disability     3. Patient will improve pain in right hip to 5/10 at worst to improve standing and ambulation tolerance and restore prior level of function. Eval Status: 8/10 at worst  Progress note: 5/10 at worst 4/22/22  Current: pain ranges from 0-4/10, patient states most of her pain occurs when sitting > 1 hour    5/6/22, met     4. Pt will have painfree right hip AROM WFL to aid in functional mechanics for ambulation/ADLs. Eval Status:   Hip Left Right   Flexion 90 deg 63 deg P! Extension 0 deg 0 deg   ABD 25 deg 5 deg P! ER 20 deg 5 deg P! IR 15 deg 0 deg P! Reassessment   Hip Left Right   Flexion 90 deg 57 deg P! Extension 0 deg 0 deg   ABD 25 deg 9 deg P! ER 20 deg 5 deg P! IR 15 deg 0 deg P! Progress note: 4/22/22  Hip Left Right   Flexion 90 deg 90   Extension 0 deg 0 deg   ABD 25 deg 20 deg   ER 20 deg 10 deg P!    IR 15 deg 0 deg         Long Term Goals: To be accomplished in 6 weeks:  1. Patient will improve FOTO score by 25 points to improve functional tolerance for walking for exercise and return to playing tennis. Eval Status: FOTO 26  Current: 49     5/6/22, in progress  FOTO score = an established functional score where 100 = no disability     2.   Pt will ambulate with no AD, step through gait pattern with equal stance time and stride length bilaterally and no evidence of trendelenberg in order to establish normal gait mechanics for walking and eventual return to participation in tennis. Eval Status: step-to gait with significant reliance on UE support with AD, right trendelenberg with decreased stephanie, decreased stance time on right LE, stride length about 1/2 on right as compared to left LE; uses SPC in left UE primarily; amb              Reassessment: step-to gait with significant reliance on UE support with AD, right trendelenberg with decreased stephanie, decreased stance time on right LE, stride length about 1/2 on right as compared to left LE; uses RW               Progress note: Step through gait using SPC, slight decreased stance time on right LE, good pairing of SPC with right stance phase.  4/22/22        3.   Pt will have 5/5 bilateral LE strength to return to goals of playing tennis and getting into/out of backtub without difficulty or pain.   Eval Status:   Hip Left (1-5) Right (1-5)   Hip Flexion 5 4   Hip Extension 5 4   Hip ABD 5 3+   Hip ADD 5 4   Hip ER 4+ 3+   Hip IR 4+ 3+      Knee Left (1-5) Right (1-5)   Knee Flexion 4+ 4   Knee Extension 4+ 4   Ankle PF 4+ 4+   Ankle DF 4+ 4+   Other       Reassessment: 4/1/22  Hip Left (1-5) Right (1-5)   Hip Flexion 5 2   Hip Extension 5 4   Hip ABD 5 2   Hip ADD 5 2   Hip ER 4+ 2   Hip IR 4+ 2      Knee Left (1-5) Right (1-5)   Knee Flexion 4+ 2+   Knee Extension 4+ 3   Ankle PF 4+ 4   Ankle DF 4+ 4+   Other        Progress note: 4/22/22  Hip Left (1-5) Right (1-5)   Hip Flexion 5 4+   Hip Extension 5 4   Hip ABD 5 3   Hip ADD 5 3   Hip ER 4+ 4   Hip IR 4+ 2      Knee Left (1-5) Right (1-5)   Knee Flexion 4+ 4   Knee Extension 4+ 4+   Ankle PF 4+ 4   Ankle DF 4+ 5   Other             4.   Patient will improve pain in right hip to 0/10 at worst to improve prolonged standing and ambulation tolerance and restore prior level of function.              Eval Status: 8/10 at worst              Progress note: 5/10 at worst 4/22/22              Current: 3/10 at worst 5/13/22        PLAN  [x]  Upgrade activities as tolerated     [x]  Continue plan of care  []  Update interventions per flow sheet       []  Discharge due to:_  []  Other:_      Wendi Marie PTA 5/16/2022  3:41 PM    Future Appointments   Date Time Provider Ethan Aburto   5/18/2022  3:30 PM Eusebio Gomez, [address] [employer] [facility]   5/23/2022  5:00 PM Karyle [facility]   5/25/2022  3:30 PM Eusebio Gomez, [address] [employer] [facility]

## 2022-05-18 ENCOUNTER — HOSPITAL ENCOUNTER (OUTPATIENT)
Dept: PHYSICAL THERAPY | Age: 52
Discharge: HOME | End: 2022-05-18
Payer: COMMERCIAL

## 2022-05-18 PROCEDURE — 97116 GAIT TRAINING THERAPY: CPT

## 2022-05-18 PROCEDURE — 97530 THERAPEUTIC ACTIVITIES: CPT

## 2022-05-18 PROCEDURE — 97110 THERAPEUTIC EXERCISES: CPT

## 2022-05-18 NOTE — PROGRESS NOTES
[facility]  [address] [facility]  [facility], [address]  Ph [phone]  Fx [phone]    Physical Therapy Progress Note  Patient name: Enriqueta Tabares Start of Care: 3/28/2022   Referral source: Sridevi Ascencio MD GLS: [id number]                Medical Diagnosis: Right hip pain [M25.551]    Onset Date:chronic, progressive                Treatment Diagnosis: right hip pain                                              ICD-10: M25.551   Prior Hospitalization: see medical history Provider#: [id number]   Medications: Verified on Patient summary List    Comorbidities: right knee meniscus debridement; depression, arthritis, hysterectomy x3 with last being complete hysterectomy 2008   Prior Level of Function: functionally independent, uses SPC for just shy of a year, active lifestyle    Visits from Start of Care: 16    Missed Visits: 0    Updated Goals/Measure of Progress: To be achieved in 4 weeks:    Short Term Goals: To be accomplished in 2 weeks:  1. Patient will be independent and compliant with HEP to progress toward goals and restore functional mobility. Eval Status: will issue HEP on first visit post op  Current: Pt reports continued daily compliance 5/16/22     2. Patient will improve FOTO score by 15 points to improve functional tolerance for exercise. Eval Status: DEIP 48  BLEUSBE:   67      9/5/00, in progress  FOTO score = an established functional score where 100 = no disability  Current: 67 GOAL MET 5/18/22      3. Patient will improve pain in right hip to 5/10 at worst to improve standing and ambulation tolerance and restore prior level of function. Eval Status: 8/10 at worst  Progress note: 5/10 at worst 4/22/22  Current: pain ranges from 0-4/10, patient states most of her pain occurs when sitting > 1 hour    5/6/22, met     4. Pt will have painfree right hip AROM WFL to aid in functional mechanics for ambulation/ADLs. Eval Status:   Hip Left Right   Flexion 90 deg 63 deg P! Extension 0 deg 0 deg   ABD 25 deg 5 deg P! ER 20 deg 5 deg P! IR 15 deg 0 deg P! Reassessment   Hip Left Right   Flexion 90 deg 57 deg P! Extension 0 deg 0 deg   ABD 25 deg 9 deg P! ER 20 deg 5 deg P! IR 15 deg 0 deg P! Progress note: 5/18/22 GOAL MET   Hip Left Right   Flexion 90 deg 90   Extension 0 deg 10 deg   ABD 40 deg 40 deg   ER 20 deg 20 deg tight   IR 15 deg 0 deg         Long Term Goals: To be accomplished in 6 weeks:  1. Patient will improve FOTO score by 25 points to improve functional tolerance for walking for exercise and return to playing tennis. Eval Status: RQGZ 91  UERREHI: 89     4/2/86, in progress  FOTO score = an established functional score where 100 = no disability              Current: 67 GOAL MET 5/18/22      2.   Pt will ambulate with no AD, step through gait pattern with equal stance time and stride length bilaterally and no evidence of trendelenberg in order to establish normal gait mechanics for walking and eventual return to participation in tennis. Eval Status: step-to gait with significant reliance on UE support with AD, right trendelenberg with decreased stephanie, decreased stance time on right LE, stride length about 1/2 on right as compared to left LE; uses SPC in left UE primarily; amb              Reassessment: step-to gait with significant reliance on UE support with AD, right trendelenberg with decreased stephanie, decreased stance time on right LE, stride length about 1/2 on right as compared to left LE; uses RW               Progress note: step through gait no AD with mild trendelenburg and decreased hip extension during toe off 5/18/22        3.   Pt will have 5/5 bilateral LE strength to return to goals of playing tennis and getting into/out of backtub without difficulty or pain.   Eval Status:   Hip Left (1-5) Right (1-5)   Hip Flexion 5 4   Hip Extension 5 4   Hip ABD 5 3+   Hip ADD 5 4   Hip ER 4+ 3+   Hip IR 4+ 3+      Knee Left (1-5) Right (1-5)   Knee Flexion 4+ 4   Knee Extension 4+ 4   Ankle PF 4+ 4+   Ankle DF 4+ 4+   Other       Reassessment: 4/1/22  Hip Left (1-5) Right (1-5)   Hip Flexion 5 2   Hip Extension 5 4   Hip ABD 5 2   Hip ADD 5 2   Hip ER 4+ 2   Hip IR 4+ 2      Knee Left (1-5) Right (1-5)   Knee Flexion 4+ 2+   Knee Extension 4+ 3   Ankle PF 4+ 4   Ankle DF 4+ 4+   Other        Progress note: 5/18/22  Hip Left (1-5) Right (1-5)   Hip Flexion 5 5   Hip Extension 5 4+   Hip ABD 5 4   Hip ADD 5 4   Hip ER 5 5   Hip IR 4+ 3      Knee Left (1-5) Right (1-5)   Knee Flexion 4+ 4+   Knee Extension 4+ 4+   Ankle PF 4+ 4+   Ankle DF 5 5   Other             4.   Patient will improve pain in right hip to 0/10 at worst to improve prolonged standing and ambulation tolerance and restore prior level of function.              Eval Status: 8/10 at worst              Progress note: 5/10 at worst 4/22/22              Current: 3/10 at worst 5/18/22    Summary of Care/ Key Functional Changes: Patient tolerated treatment session well today. Patient has met goals for FOTO and ROM at this time with in her precautions. Pt is progressing well toward pain, gait and strength goals. Patient continues to make steady progress toward goals and would benefit from continued skilled PT intervention to address remaining deficits outlined in goals below.     Patient will continue to benefit from skilled PT services to modify and progress therapeutic interventions, address functional mobility deficits, address strength deficits, analyze and address soft tissue restrictions, analyze and cue movement patterns, address imbalance/dizziness, instruct in home and community integration and improve gait mechanics and improve pain levels. to attain remaining goals.       ASSESSMENT/RECOMMENDATIONS:  [x]Continue therapy per initial plan/protocol at a frequency of  1 x per week for 4 weeks  []Continue therapy with the following recommended changes:_____________________ _____________________________ ________________________________________  []Discontinue therapy progressing towards or have reached established goals  []Discontinue therapy due to lack of appreciable progress towards goals  []Discontinue therapy due to lack of attendance or compliance  []Await Physician's recommendations/decisions regarding therapy  []Other:________________________________________________________________    Thank you for this referral.   Jackie Brown, PT 5/18/2022 4:21 PM    ________________________________________________________________________________________________________________________________________________  I certify that the above Therapy Services are being furnished while the patient is under my care. I agree with the treatment plan and certify that this therapy is necessary. [] I have read the above and request that my patient continue as recommended.   [] I have read the above report and request that my patient continue therapy with the following changes/special instructions: _______________________________________  [] I have read the above report and request that my patient be discharged from therapy    Physician's Signature:____________________ Date:_________ TIME:________       Diana Mckeon MD  ** Signature, Date and Time must be completed for valid certification **  Please sign and return to [facility] at THE [facility]  [address], [address]    Ph [phone]  Fx [phone]

## 2022-05-18 NOTE — PROGRESS NOTES
PT DAILY TREATMENT NOTE    Patient Name: Teddy Arce  Date:2022  : 1970  [x]  Patient  Verified  Payor: PRAVIN / Plan: Satya Carranza 76 / Product Type: Arther Ores /    In time:331  Out time:415  Total Treatment Time (min): 44  Total Timed Codes (min): 44  1:1 Treatment Time (MC/BCBS only): 40   Visit #: 16 of 16    Treatment Dx: Right hip pain [M25.551]    SUBJECTIVE  Pain Level (0-10 scale): 3/10  Any medication changes, allergies to medications, adverse drug reactions, diagnosis change, or new procedure performed?: [x] No    [] Yes (see summary sheet for update)  Subjective functional status/changes:   [] No changes reported  Pt reported that she continues to have pain around lateral hip     OBJECTIVE    15 min Therapeutic Exercise:  [] See flow sheet :   Rationale: increase ROM, increase strength, improve coordination, improve balance and increase proprioception to improve the patients ability to restore PLOF    15 min Therapeutic Activity:  []  See flow sheet : reassessment   Rationale: increase ROM, increase strength, improve coordination, improve balance and increase proprioception  to improve the patients ability to complete transfers and ADLs without external assist    14 min Gait Training:  step through gait no AD with mild trendelenburg and decreased hip extension during toe off; glut med activation in standing to correct trendelenburg    Rationale: To improve ambulation safety and efficiency in order to improve patient's ability to safely ambulate at home for self care.            With   [] TE   [] TA   [] neuro   [] other: Patient Education: [x] Review HEP    [] Progressed/Changed HEP based on:   [] positioning   [] body mechanics   [] transfers   [] heat/ice application    [] other:      Other Objective/Functional Measures: see goals assessment below     Pain Level (0-10 scale) post treatment: 2.5/10    ASSESSMENT/Changes in Function: Patient tolerated treatment session well today. Patient has met goals for FOTO and ROM at this time with in her precautions. Pt is progressing well toward pain, gait and strength goals. Patient continues to make steady progress toward goals and would benefit from continued skilled PT intervention to address remaining deficits outlined in goals below. Patient will continue to benefit from skilled PT services to modify and progress therapeutic interventions, address functional mobility deficits, address strength deficits, analyze and address soft tissue restrictions, analyze and cue movement patterns, address imbalance/dizziness, instruct in home and community integration and improve gait mechanics and improve pain levels. to attain remaining goals. [x]  See Plan of Care  []  See progress note/recertification  []  See Discharge Summary         Progress towards goals / Updated goals:  Short Term Goals: To be accomplished in 2 weeks:  1. Patient will be independent and compliant with HEP to progress toward goals and restore functional mobility. Eval Status: will issue HEP on first visit post op  Current: Pt reports continued daily compliance 5/16/22     2. Patient will improve FOTO score by 15 points to improve functional tolerance for exercise. Eval Status: JFBO 17  YSVUPUX:   07      2/3/54, in progress  FOTO score = an established functional score where 100 = no disability  Current: 67 GOAL MET 5/18/22     3. Patient will improve pain in right hip to 5/10 at worst to improve standing and ambulation tolerance and restore prior level of function. Eval Status: 8/10 at worst  Progress note: 5/10 at worst 4/22/22  Current: pain ranges from 0-4/10, patient states most of her pain occurs when sitting > 1 hour    5/6/22, met     4. Pt will have painfree right hip AROM WFL to aid in functional mechanics for ambulation/ADLs. Eval Status:   Hip Left Right   Flexion 90 deg 63 deg P! Extension 0 deg 0 deg   ABD 25 deg 5 deg P! ER 20 deg 5 deg P!    IR 15 deg 0 deg P! Reassessment   Hip Left Right   Flexion 90 deg 57 deg P! Extension 0 deg 0 deg   ABD 25 deg 9 deg P! ER 20 deg 5 deg P! IR 15 deg 0 deg P! Progress note: 5/18/22 GOAL MET   Hip Left Right   Flexion 90 deg 90   Extension 0 deg 10 deg   ABD 40 deg 40 deg   ER 20 deg 20 deg tight   IR 15 deg 0 deg         Long Term Goals: To be accomplished in 6 weeks:  1. Patient will improve FOTO score by 25 points to improve functional tolerance for walking for exercise and return to playing tennis. Eval Status: CDNI 37  LESHUGQ: 45     9/1/45, in progress  FOTO score = an established functional score where 100 = no disability   Current: 67 GOAL MET 5/18/22      2.   Pt will ambulate with no AD, step through gait pattern with equal stance time and stride length bilaterally and no evidence of trendelenberg in order to establish normal gait mechanics for walking and eventual return to participation in tennis. Eval Status: step-to gait with significant reliance on UE support with AD, right trendelenberg with decreased stephanie, decreased stance time on right LE, stride length about 1/2 on right as compared to left LE; uses SPC in left UE primarily; amb              Reassessment: step-to gait with significant reliance on UE support with AD, right trendelenberg with decreased stephanie, decreased stance time on right LE, stride length about 1/2 on right as compared to left LE; uses RW               Progress note: step through gait no AD with mild trendelenburg and decreased hip extension during toe off 5/18/22        3.   Pt will have 5/5 bilateral LE strength to return to goals of playing tennis and getting into/out of backtub without difficulty or pain.   Eval Status:   Hip Left (1-5) Right (1-5)   Hip Flexion 5 4   Hip Extension 5 4   Hip ABD 5 3+   Hip ADD 5 4   Hip ER 4+ 3+   Hip IR 4+ 3+      Knee Left (1-5) Right (1-5)   Knee Flexion 4+ 4   Knee Extension 4+ 4   Ankle PF 4+ 4+   Ankle DF 4+ 4+   Other     Reassessment: 4/1/22  Hip Left (1-5) Right (1-5)   Hip Flexion 5 2   Hip Extension 5 4   Hip ABD 5 2   Hip ADD 5 2   Hip ER 4+ 2   Hip IR 4+ 2      Knee Left (1-5) Right (1-5)   Knee Flexion 4+ 2+   Knee Extension 4+ 3   Ankle PF 4+ 4   Ankle DF 4+ 4+   Other        Progress note: 5/18/22  Hip Left (1-5) Right (1-5)   Hip Flexion 5 5   Hip Extension 5 4+   Hip ABD 5 4   Hip ADD 5 4   Hip ER 5 5   Hip IR 4+ 3      Knee Left (1-5) Right (1-5)   Knee Flexion 4+ 4+   Knee Extension 4+ 4+   Ankle PF 4+ 4+   Ankle DF 5 5   Other             4.   Patient will improve pain in right hip to 0/10 at worst to improve prolonged standing and ambulation tolerance and restore prior level of function.              Eval Status: 8/10 at worst              Progress note: 5/10 at worst 4/22/22              Current: 3/10 at worst 5/18/22      PLAN  []  Upgrade activities as tolerated     [x]  Continue plan of care  []  Update interventions per flow sheet       []  Discharge due to:_  []  Other:_      Albaro Baptiste PT 5/18/2022  3:39 PM    Future Appointments   Date Time Provider Ethan Aburto   5/23/2022  5:00 PM Buffy Chaparro [facility]   5/25/2022  3:30 PM Sade Yuan PT [facility]

## 2022-05-23 ENCOUNTER — HOSPITAL ENCOUNTER (OUTPATIENT)
Dept: PHYSICAL THERAPY | Age: 52
Discharge: HOME | End: 2022-05-23
Payer: COMMERCIAL

## 2022-05-23 PROCEDURE — 97110 THERAPEUTIC EXERCISES: CPT

## 2022-05-23 PROCEDURE — 97112 NEUROMUSCULAR REEDUCATION: CPT

## 2022-05-23 PROCEDURE — 97530 THERAPEUTIC ACTIVITIES: CPT

## 2022-05-23 NOTE — PROGRESS NOTES
PT DAILY TREATMENT NOTE    Patient Name: Cassie Agent  Date: 2022  : 1970  [x]  Patient  Verified  Payor:  / Plan: Satya Carranza 76 / Product Type:  /    In Time: 4:18  Out Time: 5:00  Total Treatment Time (min): 42  Total Timed Codes (min): 42  1:1 Treatment Time ( only): 42   Visit #:     Treatment Dx: Right hip pain [M25.551]    SUBJECTIVE  Pre-Treatment Pain Level (0-10 scale): 1    Any medication changes, allergies to medications, adverse drug reactions, diagnosis change, or new procedure performed?: [x] No    [] Yes (see summary sheet for update)    Subjective Functional Status/Changes:  [] No changes reported  \"I saw Dr. Sugar Dyer today and he gave me a great report. He said my hip is looking good and that I no longer have hip precautions. I go back to him in 6 weeks. \"    OBJECTIVE    9 min Therapeutic Exercise:  [x] See flow sheet   Rationale: increase ROM, increase strength, and decrease pain to assist in improving patient's ability to perform functional activities and ADLs    23 min Therapeutic Activity:  [x] See flow sheet   Rationale: increase ROM, increase strength, and improve coordination to assist in improving patient's ability to perform functional activities and ADLs    10 min Neuromuscular Re-Education:  [x] See flow sheet   Rationale: improve coordination, improve balance/proprioception, improve posture, and improve core stabilization to assist in improving patient's ability to perform functional activities and ADLs as well as to improve core stabilization during static/dynamic movements      With   [x] TE   [] TA   [] neuro   [] other: Patient Education: [x] Review HEP    [] Progressed/Changed HEP based on:   [] positioning   [] body mechanics   [] transfers   [] heat/ice application    [] other:      Other Objective/Functional Measures: N/A     Pain Level (0-10 scale) Post Treatment: 0    ASSESSMENT/Changes in Function:  Patient responded well to today's treatment without any adverse reactions. Patient able to progress to alternating stair taps using a 6\" platform today, concentrating on maintaining her balance while alternating feet. Patient also did well with the transition from step up and overs to instead going up/down 4 steps consecutively using a step-through pattern, although she does have more difficulty (mild) when descending stairs. Patient will continue to benefit from skilled PT services to further modify and progress therapeutic interventions, address functional mobility deficits, address ROM deficits, address strength deficits, analyze and address soft tissue restrictions, analyze and cue movement patterns, analyze and modify body mechanics/ergonomics, assess and modify postural abnormalities, and instruct in home and community integration to attain remaining goals. [x]  See Plan of Care  []  See Progress Note/Recertification  []  See Discharge Summary         Progress Towards Goals/Updated Goals:    Short Term Goals: To be accomplished in 2 weeks:  1. Patient will be independent and compliant with HEP to progress toward goals and restore functional mobility. Eval Status: will issue HEP on first visit post op  Current: Pt reports continued daily compliance. 5/23/22, met     2. Patient will improve FOTO score by 15 points to improve functional tolerance for exercise. Eval Status: FOTO 26  Current:   49      5/6/22  FOTO score = an established functional score where 100 = no disability  Current: 67 GOAL MET 5/18/22      3. Patient will improve pain in right hip to 5/10 at worst to improve standing and ambulation tolerance and restore prior level of function. Eval Status: 8/10 at worst  Progress note: 5/10 at worst 4/22/22  Current: pain ranges from 0-4/10, patient states most of her pain occurs when sitting > 1 hour    5/6/22, met     4.  Pt will have painfree right hip AROM WFL to aid in functional mechanics for ambulation/ADLs. Eval Status:   Hip Left Right   Flexion 90 deg 63 deg P! Extension 0 deg 0 deg   ABD 25 deg 5 deg P! ER 20 deg 5 deg P! IR 15 deg 0 deg P! Reassessment   Hip Left Right   Flexion 90 deg 57 deg P! Extension 0 deg 0 deg   ABD 25 deg 9 deg P! ER 20 deg 5 deg P! IR 15 deg 0 deg P! Progress note: 5/18/22 GOAL MET   Hip Left Right   Flexion 90 deg 90   Extension 0 deg 10 deg   ABD 40 deg 40 deg   ER 20 deg 20 deg tight   IR 15 deg 0 deg         Long Term Goals: To be accomplished in 6 weeks:  1. Patient will improve FOTO score by 25 points to improve functional tolerance for walking for exercise and return to playing tennis. Eval Status: FOTO 26  Current: 49     5/6/22  FOTO score = an established functional score where 100 = no disability              Current: 67 GOAL MET 5/18/22      2.   Pt will ambulate with no AD, step through gait pattern with equal stance time and stride length bilaterally and no evidence of trendelenberg in order to establish normal gait mechanics for walking and eventual return to participation in tennis. Eval Status: step-to gait with significant reliance on UE support with AD, right trendelenberg with decreased stephanie, decreased stance time on right LE, stride length about 1/2 on right as compared to left LE; uses SPC in left UE primarily; amb              Reassessment: step-to gait with significant reliance on UE support with AD, right trendelenberg with decreased stephanie, decreased stance time on right LE, stride length about 1/2 on right as compared to left LE; uses RW               Progress note: step through gait no AD with mild trendelenburg and decreased hip extension during toe off 5/18/22        3.   Pt will have 5/5 bilateral LE strength to return to goals of playing tennis and getting into/out of backtub without difficulty or pain.   Eval Status:   Hip Left (1-5) Right (1-5)   Hip Flexion 5 4   Hip Extension 5 4   Hip ABD 5 3+   Hip ADD 5 4   Hip ER 4+ 3+   Hip IR 4+ 3+      Knee Left (1-5) Right (1-5)   Knee Flexion 4+ 4   Knee Extension 4+ 4   Ankle PF 4+ 4+   Ankle DF 4+ 4+   Other       Reassessment: 4/1/22  Hip Left (1-5) Right (1-5)   Hip Flexion 5 2   Hip Extension 5 4   Hip ABD 5 2   Hip ADD 5 2   Hip ER 4+ 2   Hip IR 4+ 2      Knee Left (1-5) Right (1-5)   Knee Flexion 4+ 2+   Knee Extension 4+ 3   Ankle PF 4+ 4   Ankle DF 4+ 4+   Other        Progress note: 5/18/22  Hip Left (1-5) Right (1-5)   Hip Flexion 5 5   Hip Extension 5 4+   Hip ABD 5 4   Hip ADD 5 4   Hip ER 5 5   Hip IR 4+ 3      Knee Left (1-5) Right (1-5)   Knee Flexion 4+ 4+   Knee Extension 4+ 4+   Ankle PF 4+ 4+   Ankle DF 5 5   Other             4.   Patient will improve pain in right hip to 0/10 at worst to improve prolonged standing and ambulation tolerance and restore prior level of function.              Eval Status: 8/10 at worst              Progress note: 5/10 at worst 4/22/22              Current: 3/10 at worst 5/18/22           PLAN  []  Upgrade Activities as Tolerated     [x]  Continue Plan of Care  []  Update Interventions per Flow Sheet       []  Discharge Due To:_  []  Other:_      Grace Liana Martinez, PT, DPT, ATR  5/23/2022 1:20 PM    Future Appointments   Date Time Provider Ethan Aburto   5/23/2022  5:00 PM Willow Orozco [facility]   6/2/2022  3:30 PM Mele Walsh PT [facility]   6/9/2022  4:15 PM Mele Walsh PT [facility]   6/16/2022  3:30 PM Mele Walsh PT [facility]

## 2022-05-25 ENCOUNTER — APPOINTMENT (OUTPATIENT)
Dept: PHYSICAL THERAPY | Age: 52
End: 2022-05-25
Payer: COMMERCIAL

## 2022-06-02 ENCOUNTER — HOSPITAL ENCOUNTER (OUTPATIENT)
Dept: PHYSICAL THERAPY | Age: 52
Discharge: HOME | End: 2022-06-02
Payer: COMMERCIAL

## 2022-06-02 PROCEDURE — 97110 THERAPEUTIC EXERCISES: CPT

## 2022-06-02 PROCEDURE — 97112 NEUROMUSCULAR REEDUCATION: CPT

## 2022-06-02 PROCEDURE — 97530 THERAPEUTIC ACTIVITIES: CPT

## 2022-06-02 NOTE — PROGRESS NOTES
PT DAILY TREATMENT NOTE    Patient Name: Lorie Hernandez  Date: 2022  : 1970  [x]  Patient  Verified  Payor: PRAVIN / Plan: Satya Carranza 76 / Product Type:  /    In Time: 3:30  Out Time: 4:13  Total Treatment Time (min): 43  Total Timed Codes (min): 43  1:1 Treatment Time ( only): 40   Visit #:     Treatment Dx: Right hip pain [M25.551]    SUBJECTIVE  Pre-Treatment Pain Level (0-10 scale): 1    Any medication changes, allergies to medications, adverse drug reactions, diagnosis change, or new procedure performed?: [x] No    [] Yes (see summary sheet for update)    Subjective Functional Status/Changes:  [] No changes reported  Patient states her hip has been doing really well, with minimal to no pain.     OBJECTIVE    8 min Therapeutic Exercise:  [x] See flow sheet   Rationale: increase ROM, increase strength, and decrease pain to assist in improving patient's ability to perform functional activities and ADLs    16 min Therapeutic Activity:  [x] See flow sheet   Rationale: increase ROM, increase strength, and improve coordination to assist in improving patient's ability to perform functional activities and ADLs    16 min Neuromuscular Re-Education:  [x] See flow sheet   Rationale: improve coordination, improve balance/proprioception, improve posture, and improve core stabilization to assist in improving patient's ability to perform functional activities and ADLs as well as to improve core stabilization during static/dynamic movements      With   [x] TE   [] TA   [] neuro   [] other: Patient Education: [x] Review HEP    [] Progressed/Changed HEP based on:   [] positioning   [] body mechanics   [] transfers   [] heat/ice application    [] other:      Other Objective/Functional Measures: TUG Test: 10 seconds with and without AD; 30\" STS 12 reps, good control     Pain Level (0-10 scale) Post Treatment: 0    ASSESSMENT/Changes in Function:  Patient responded well to today's treatment without any adverse reactions. Patient challenged with transition to [place] 3 way balance exercise, but overall did fairly well and required only minimal use of parallel bars. Patient able to progress to 8\" step up and overs without difficulty or pain. Patient scores within the lower risk of falls based up TUG testing and 30\" STS testing. Patient will continue to benefit from skilled PT services to further modify and progress therapeutic interventions, address functional mobility deficits, address ROM deficits, address strength deficits, analyze and address soft tissue restrictions, analyze and cue movement patterns, analyze and modify body mechanics/ergonomics, assess and modify postural abnormalities, and instruct in home and community integration to attain remaining goals. [x]  See Plan of Care  []  See Progress Note/Recertification  []  See Discharge Summary         Progress Towards Goals/Updated Goals:    Short Term Goals: To be accomplished in 2 weeks:  1. Patient will be independent and compliant with HEP to progress toward goals and restore functional mobility. Eval Status: will issue HEP on first visit post op  Current: Pt reports continued daily compliance. 5/23/22, met     2. Patient will improve FOTO score by 15 points to improve functional tolerance for exercise. Eval Status: FOTO 26  Current:   49      5/6/22  FOTO score = an established functional score where 100 = no disability  Current: 67 GOAL MET 5/18/22      3. Patient will improve pain in right hip to 5/10 at worst to improve standing and ambulation tolerance and restore prior level of function. Eval Status: 8/10 at worst  Progress note: 5/10 at worst 4/22/22  Current: pain ranges from 0-4/10, patient states most of her pain occurs when sitting > 1 hour    5/6/22, met     4. Pt will have painfree right hip AROM WFL to aid in functional mechanics for ambulation/ADLs. Eval Status:   Hip Left Right   Flexion 90 deg 63 deg P! Extension 0 deg 0 deg   ABD 25 deg 5 deg P! ER 20 deg 5 deg P! IR 15 deg 0 deg P! Reassessment   Hip Left Right   Flexion 90 deg 57 deg P! Extension 0 deg 0 deg   ABD 25 deg 9 deg P! ER 20 deg 5 deg P! IR 15 deg 0 deg P! Progress note: 5/18/22 GOAL MET   Hip Left Right   Flexion 90 deg 90   Extension 0 deg 10 deg   ABD 40 deg 40 deg   ER 20 deg 20 deg tight   IR 15 deg 0 deg         Long Term Goals: To be accomplished in 6 weeks:  1. Patient will improve FOTO score by 25 points to improve functional tolerance for walking for exercise and return to playing tennis. Eval Status: FOTO 26  Current: 49     5/6/22  FOTO score = an established functional score where 100 = no disability              Current: 67 GOAL MET 5/18/22      2.   Pt will ambulate with no AD, step through gait pattern with equal stance time and stride length bilaterally and no evidence of trendelenberg in order to establish normal gait mechanics for walking and eventual return to participation in tennis. Eval Status: step-to gait with significant reliance on UE support with AD, right trendelenberg with decreased stephanie, decreased stance time on right LE, stride length about 1/2 on right as compared to left LE; uses SPC in left UE primarily; amb              Reassessment: step-to gait with significant reliance on UE support with AD, right trendelenberg with decreased stephanie, decreased stance time on right LE, stride length about 1/2 on right as compared to left LE; uses RW               Progress note: step through gait no AD with mild trendelenburg and decreased hip extension during toe off 5/18/22        3.   Pt will have 5/5 bilateral LE strength to return to goals of playing tennis and getting into/out of backtub without difficulty or pain.   Eval Status:   Hip Left (1-5) Right (1-5)   Hip Flexion 5 4   Hip Extension 5 4   Hip ABD 5 3+   Hip ADD 5 4   Hip ER 4+ 3+   Hip IR 4+ 3+      Knee Left (1-5) Right (1-5)   Knee Flexion 4+ 4 Knee Extension 4+ 4   Ankle PF 4+ 4+   Ankle DF 4+ 4+   Other       Reassessment: 4/1/22  Hip Left (1-5) Right (1-5)   Hip Flexion 5 2   Hip Extension 5 4   Hip ABD 5 2   Hip ADD 5 2   Hip ER 4+ 2   Hip IR 4+ 2      Knee Left (1-5) Right (1-5)   Knee Flexion 4+ 2+   Knee Extension 4+ 3   Ankle PF 4+ 4   Ankle DF 4+ 4+   Other        Progress note: 5/18/22  Hip Left (1-5) Right (1-5)   Hip Flexion 5 5   Hip Extension 5 4+   Hip ABD 5 4   Hip ADD 5 4   Hip ER 5 5   Hip IR 4+ 3      Knee Left (1-5) Right (1-5)   Knee Flexion 4+ 4+   Knee Extension 4+ 4+   Ankle PF 4+ 4+   Ankle DF 5 5   Other             4.   Patient will improve pain in right hip to 0/10 at worst to improve prolonged standing and ambulation tolerance and restore prior level of function.              Eval Status: 8/10 at worst              Progress note: 5/10 at worst 4/22/22              Current: pain has ranged from 0-2/10 over the last week     6/2/22, in progress        PLAN  []  Upgrade Activities as Tolerated     [x]  Continue Plan of Care  []  Update Interventions per Flow Sheet       []  Discharge Due To:_  []  Other:_      Yair Poole.  Juan, PT, DPT, ATR  6/2/2022 8:43 AM    Future Appointments   Date Time Provider Ethan Aburto   6/2/2022  3:30 PM Alessandra Llamas [facility]   6/9/2022  4:15 PM Yuri Wei PT [facility]   6/16/2022  3:30 PM Yuri Wei PT [facility]

## 2022-06-09 ENCOUNTER — HOSPITAL ENCOUNTER (OUTPATIENT)
Dept: PHYSICAL THERAPY | Age: 52
Discharge: HOME | End: 2022-06-09
Payer: COMMERCIAL

## 2022-06-09 PROCEDURE — 97116 GAIT TRAINING THERAPY: CPT

## 2022-06-09 PROCEDURE — 97112 NEUROMUSCULAR REEDUCATION: CPT

## 2022-06-09 PROCEDURE — 97110 THERAPEUTIC EXERCISES: CPT

## 2022-06-09 NOTE — PROGRESS NOTES
PT DAILY TREATMENT NOTE    Patient Name: Min Jovel  Date:2022  : 1970  [x]  Patient  Verified  Payor:  / Plan: Satya Carranza 74 / Product Type:  /    In time:418  Out time:500  Total Treatment Time (min): 42  Total Timed Codes (min): 42  1:1 Treatment Time (MC/BCBS only): 42   Visit #: 19 of 20    Treatment Dx: Right hip pain [M25.551]    SUBJECTIVE  Pain Level (0-10 scale): 0/10  Any medication changes, allergies to medications, adverse drug reactions, diagnosis change, or new procedure performed?: [x] No    [] Yes (see summary sheet for update)  Subjective functional status/changes:   [] No changes reported  Pt reported that she is still stiff but has no pain. OBJECTIVE    15 min Therapeutic Exercise:  [x] See flow sheet :   Rationale: increase ROM, increase strength, improve coordination, improve balance and increase proprioception to improve the patients ability to restore PLOF    15 min Gait Training:  cues for lateral lean    Rationale: To improve ambulation safety and efficiency in order to improve patient's ability to safely ambulate at home for self care. 12 min Neuromuscular Re-education:  [x]  See flow sheet :   Rationale: increase ROM, increase strength, improve coordination, improve balance and increase proprioception  to improve the patients ability to activate ankle and hip stabilizers without compensation            With   [x] TE   [x] TA   [x] neuro   [] other: Patient Education: [x] Review HEP    [] Progressed/Changed HEP based on:   [] positioning   [] body mechanics   [] transfers   [] heat/ice application    [] other:      Pain Level (0-10 scale) post treatment: 1/10    ASSESSMENT/Changes in Function: Patient tolerated treatment session well today. Patient had no complaints with addition of sidelying clams and sidelying hip abduction to exercise program to accomplish improved hip abduction strength.   Patient continues to make steady progress toward goals and would benefit from continued skilled PT intervention to address remaining deficits outlined in goals below. Patient will continue to benefit from skilled PT services to modify and progress therapeutic interventions, address functional mobility deficits, address ROM deficits, address strength deficits, analyze and address soft tissue restrictions, analyze and cue movement patterns, analyze and modify body mechanics/ergonomics, assess and modify postural abnormalities and instruct in home and community integration to attain remaining goals. [x]  See Plan of Care  []  See progress note/recertification  []  See Discharge Summary         Progress towards goals / Updated goals:  Short Term Goals: To be accomplished in 2 weeks:  1. Patient will be independent and compliant with HEP to progress toward goals and restore functional mobility. Eval Status: will issue HEP on first visit post op  Current: Pt reports continued daily compliance.   5/23/22, met     2. Patient will improve FOTO score by 15 points to improve functional tolerance for exercise. Eval Status: FOTO 26  Current:   49      5/6/22  FOTO score = an established functional score where 100 = no disability  Current: 67 GOAL MET 5/18/22      3. Patient will improve pain in right hip to 5/10 at worst to improve standing and ambulation tolerance and restore prior level of function. Eval Status: 8/10 at worst  Progress note: 5/10 at worst 4/22/22  Current: pain ranges from 0-4/10, patient states most of her pain occurs when sitting > 1 hour    5/6/22, met     4. Pt will have painfree right hip AROM WFL to aid in functional mechanics for ambulation/ADLs. Eval Status:   Hip Left Right   Flexion 90 deg 63 deg P! Extension 0 deg 0 deg   ABD 25 deg 5 deg P! ER 20 deg 5 deg P! IR 15 deg 0 deg P! Reassessment   Hip Left Right   Flexion 90 deg 57 deg P! Extension 0 deg 0 deg   ABD 25 deg 9 deg P! ER 20 deg 5 deg P!    IR 15 deg 0 deg P! Progress note: 5/18/22 GOAL MET   Hip Left Right   Flexion 90 deg 90   Extension 0 deg 10 deg   ABD 40 deg 40 deg   ER 20 deg 20 deg tight   IR 15 deg 0 deg         Long Term Goals: To be accomplished in 6 weeks:  1. Patient will improve FOTO score by 25 points to improve functional tolerance for walking for exercise and return to playing tennis. Eval Status: FOTO 26  Current: 49     5/6/22  FOTO score = an established functional score where 100 = no disability              Current: 67 GOAL MET 5/18/22      2.   Pt will ambulate with no AD, step through gait pattern with equal stance time and stride length bilaterally and no evidence of trendelenberg in order to establish normal gait mechanics for walking and eventual return to participation in tennis. Eval Status: step-to gait with significant reliance on UE support with AD, right trendelenberg with decreased stephanie, decreased stance time on right LE, stride length about 1/2 on right as compared to left LE; uses SPC in left UE primarily; amb              Reassessment: step-to gait with significant reliance on UE support with AD, right trendelenberg with decreased stephanie, decreased stance time on right LE, stride length about 1/2 on right as compared to left LE; uses RW               Progress note: step through gait no AD with mild trendelenburg and decreased hip extension during toe off 5/18/22   Current: strep through gait with no AD with slight lateral lean to right during right stance phase corrected with verbal cueing. 6/9/22        3.   Pt will have 5/5 bilateral LE strength to return to goals of playing tennis and getting into/out of backtub without difficulty or pain.   Eval Status:   Hip Left (1-5) Right (1-5)   Hip Flexion 5 4   Hip Extension 5 4   Hip ABD 5 3+   Hip ADD 5 4   Hip ER 4+ 3+   Hip IR 4+ 3+      Knee Left (1-5) Right (1-5)   Knee Flexion 4+ 4   Knee Extension 4+ 4   Ankle PF 4+ 4+   Ankle DF 4+ 4+   Other     Reassessment: 4/1/22  Hip Left (1-5) Right (1-5)   Hip Flexion 5 2   Hip Extension 5 4   Hip ABD 5 2   Hip ADD 5 2   Hip ER 4+ 2   Hip IR 4+ 2      Knee Left (1-5) Right (1-5)   Knee Flexion 4+ 2+   Knee Extension 4+ 3   Ankle PF 4+ 4   Ankle DF 4+ 4+   Other        Progress note: 5/18/22  Hip Left (1-5) Right (1-5)   Hip Flexion 5 5   Hip Extension 5 4+   Hip ABD 5 4   Hip ADD 5 4   Hip ER 5 5   Hip IR 4+ 3      Knee Left (1-5) Right (1-5)   Knee Flexion 4+ 4+   Knee Extension 4+ 4+   Ankle PF 4+ 4+   Ankle DF 5 5   Other             4.   Patient will improve pain in right hip to 0/10 at worst to improve prolonged standing and ambulation tolerance and restore prior level of function.              Eval Status: 8/10 at worst              Progress note: 5/10 at worst 4/22/22              Current: pain has ranged from 0-2/10 over the last week     6/2/22, in progress     PLAN  []  Upgrade activities as tolerated     [x]  Continue plan of care  []  Update interventions per flow sheet       []  Discharge due to:_  []  Other:_      Eduardo Harris, PT 6/9/2022  4:20 PM    Future Appointments   Date Time Provider Ethan Aburto   6/16/2022  3:30 PM Carlisle Bosworth, [address] [facility]

## 2022-06-16 ENCOUNTER — HOSPITAL ENCOUNTER (OUTPATIENT)
Dept: PHYSICAL THERAPY | Age: 52
Discharge: HOME | End: 2022-06-16
Payer: COMMERCIAL

## 2022-06-16 PROCEDURE — 97112 NEUROMUSCULAR REEDUCATION: CPT

## 2022-06-16 PROCEDURE — 97110 THERAPEUTIC EXERCISES: CPT

## 2022-06-16 PROCEDURE — 97530 THERAPEUTIC ACTIVITIES: CPT

## 2022-06-16 NOTE — PROGRESS NOTES
Physical Therapy Discharge Instructions    [facility]  [address], [address] [facility]  Ph [phone]  Fx [phone]      Patient: Brandon Gilbert  : 1970      Continue Home Exercise Program 1 times per day for 4 weeks, then decrease to 3 times per week      Continue with    [x] Ice  as needed 1 times per day     [x] Heat           Follow up with MD:     [] Upon completion of therapy     [x] As needed      Recommendations:     [x]   Return to activity with home program    []   Return to activity with the following modifications:       []Post Rehab Program    []Join Independent aquatic program     []Return to/join local gym        Marixa Sepulveda 2022 3:34 PM

## 2022-06-16 NOTE — PROGRESS NOTES
PT DAILY TREATMENT NOTE    Patient Name: Gisselle Sánchez  Date:2022  : 1970  [x]  Patient  Verified  Payor:  / Plan: Satya Carranza 76 / Product Type:  /    In time:330  Out time:415  Total Treatment Time (min): 45  Total Timed Codes (min): 45  1:1 Treatment Time (MC/BCBS only): 45   Visit #: 20 of 20    Treatment Dx: Right hip pain [M25.551]    SUBJECTIVE  Pain Level (0-10 scale): .05/10  Any medication changes, allergies to medications, adverse drug reactions, diagnosis change, or new procedure performed?: [x] No    [] Yes (see summary sheet for update)  Subjective functional status/changes:   [] No changes reported  Pt reported that she is ready for DC from services. OBJECTIVE    15 min Therapeutic Exercise:  [] See flow sheet :   Rationale: increase ROM, increase strength, improve coordination, improve balance and increase proprioception to improve the patients ability to restore PLOF    8 min Therapeutic Activity:  []  See flow sheet :   Rationale: increase ROM, increase strength, improve coordination, improve balance and increase proprioception  to improve the patients ability to complete transfers and ADLs without external assist     15 min Neuromuscular Re-education:  []  See flow sheet :   Rationale: increase ROM, increase strength, improve coordination, improve balance and increase proprioception  to improve the patients ability to activate ankle and hip stabilizers without compensation    7 min Gait Trainin feet with  on level and uneven surfaces with ind level of assistance   Rationale: To improve ambulation safety and efficiency in order to improve patient's ability to safely ambulate at home for self care.            With   [x] TE   [x] TA   [x] neuro   [] other: Patient Education: [x] Review HEP    [] Progressed/Changed HEP based on:   [] positioning   [] body mechanics   [] transfers   [] heat/ice application    [] other:      Pain Level (0-10 scale) post treatment: 2/10    ASSESSMENT/Changes in Function: Patient tolerated treatment session well today. Patient had been treated by PT for 20 visits since 3/28/22 s/p Right BRITTANY. Pt has made good progress and has met all goals except pain and strength but is progressing well toward goals. Patient continues to have slight decreased right hip IR/abduction strength. Pt will be DC to HEP For continued strengthening and pain control. [x]  See Plan of Care  []  See progress note/recertification  [x]  See Discharge Summary         Progress towards goals / Updated goals:  Short Term Goals: To be accomplished in 2 weeks:  1. Patient will be independent and compliant with HEP to progress toward goals and restore functional mobility. Eval Status: will issue HEP on first visit post op  Current: Pt reports continued daily compliance.   5/23/22, met     2. Patient will improve FOTO score by 15 points to improve functional tolerance for exercise. Eval Status: FOTO 26  Current:   49      5/6/22  FOTO score = an established functional score where 100 = no disability  Current: 67 GOAL MET 5/18/22      3. Patient will improve pain in right hip to 5/10 at worst to improve standing and ambulation tolerance and restore prior level of function. Eval Status: 8/10 at worst  Progress note: 5/10 at worst 4/22/22  Current: pain ranges from 0-4/10, patient states most of her pain occurs when sitting > 1 hour    5/6/22, met     4. Pt will have painfree right hip AROM WFL to aid in functional mechanics for ambulation/ADLs. Eval Status:   Hip Left Right   Flexion 90 deg 63 deg P! Extension 0 deg 0 deg   ABD 25 deg 5 deg P! ER 20 deg 5 deg P! IR 15 deg 0 deg P! Reassessment   Hip Left Right   Flexion 90 deg 57 deg P! Extension 0 deg 0 deg   ABD 25 deg 9 deg P! ER 20 deg 5 deg P! IR 15 deg 0 deg P!    Progress note: 5/18/22 GOAL MET   Hip Left Right   Flexion 90 deg 90   Extension 0 deg 10 deg   ABD 40 deg 40 deg   ER 20 deg 20 deg tight   IR 15 deg 0 deg         Long Term Goals: To be accomplished in 6 weeks:  1. Patient will improve FOTO score by 25 points to improve functional tolerance for walking for exercise and return to playing tennis. Eval Status: FOTO 26  Current: 49     5/6/22  FOTO score = an established functional score where 100 = no disability              Current: 67 GOAL MET 5/18/22      2.   Pt will ambulate with no AD, step through gait pattern with equal stance time and stride length bilaterally and no evidence of trendelenberg in order to establish normal gait mechanics for walking and eventual return to participation in tennis. Eval Status: step-to gait with significant reliance on UE support with AD, right trendelenberg with decreased stephanie, decreased stance time on right LE, stride length about 1/2 on right as compared to left LE; uses SPC in left UE primarily; amb              Reassessment: step-to gait with significant reliance on UE support with AD, right trendelenberg with decreased stephanie, decreased stance time on right LE, stride length about 1/2 on right as compared to left LE; uses RW               Progress note: step through gait no AD with mild trendelenburg and decreased hip extension during toe off 5/18/22              Current: strep through gait with no AD with slight lateral lean to right during right stance phase corrected with verbal cueing. 6/9/22        3.   Pt will have 5/5 bilateral LE strength to return to goals of playing tennis and getting into/out of backtub without difficulty or pain.   Eval Status:   Hip Left (1-5) Right (1-5)   Hip Flexion 5 4   Hip Extension 5 4   Hip ABD 5 3+   Hip ADD 5 4   Hip ER 4+ 3+   Hip IR 4+ 3+      Knee Left (1-5) Right (1-5)   Knee Flexion 4+ 4   Knee Extension 4+ 4   Ankle PF 4+ 4+   Ankle DF 4+ 4+   Other       Reassessment: 4/1/22  Hip Left (1-5) Right (1-5)   Hip Flexion 5 2   Hip Extension 5 4   Hip ABD 5 2   Hip ADD 5 2   Hip ER 4+ 2   Hip IR 4+ 2      Knee Left (1-5) Right (1-5)   Knee Flexion 4+ 2+   Knee Extension 4+ 3   Ankle PF 4+ 4   Ankle DF 4+ 4+   Other        Progress note: 5/18/22  Hip Left (1-5) Right (1-5)   Hip Flexion 5 5   Hip Extension 5 4+   Hip ABD 5 4   Hip ADD 5 4   Hip ER 5 5   Hip IR 4+ 3      Knee Left (1-5) Right (1-5)   Knee Flexion 4+ 4+   Knee Extension 4+ 4+   Ankle PF 4+ 4+   Ankle DF 5 5   Other        Progress note: 6/16/22  Hip Left (1-5) Right (1-5)   Hip Flexion 5 5   Hip Extension 5 5   Hip ABD 5 4+   Hip ADD 5 5   Hip ER 5 5   Hip IR 5 4      Knee Left (1-5) Right (1-5)   Knee Flexion 5 4+   Knee Extension 5 5   Ankle PF 4+ 4+   Ankle DF 5 5   Other            4.   Patient will improve pain in right hip to 0/10 at worst to improve prolonged standing and ambulation tolerance and restore prior level of function.              Eval Status: 8/10 at worst              Progress note: 5/10 at worst 4/22/22              Current: pain 1/10 at worst with swimming 6/16/22 PROGRESSING         PLAN  []  Upgrade activities as tolerated     [x]  Continue plan of care  []  Update interventions per flow sheet       []  Discharge due to:_  []  Other:_      Levi Serna, PT 6/16/2022  3:32 PM    No future appointments.

## 2022-06-16 NOTE — PROGRESS NOTES
[facility]  [address], [address] [facility] [address]  Ph [phone]  Fx [phone]    Physical Therapy Discharge Summary    Patient name: Enriqueta Healy Start of Care: 3/28/2022   Referral source: Nell Ascencio MD KOE: [id number]                Medical Diagnosis: Right hip pain [M25.551]    Onset Date:chronic, progressive                Treatment Diagnosis: right hip pain                                              ICD-10: M25.551   Prior Hospitalization: see medical history Provider#: [id number]   Medications: Verified on Patient summary List    Comorbidities: right knee meniscus debridement; depression, arthritis, hysterectomy x3 with last being complete hysterectomy 2008   Prior Level of Function: functionally independent, uses SPC for just shy of a year, active lifestyle     Visits from Start of Care: 20    Missed Visits: 0    Reporting Period : 3/28/22 to 6/16/22    Goals/Measure of Progress:  Short Term Goals: To be accomplished in 2 weeks:  1. Patient will be independent and compliant with HEP to progress toward goals and restore functional mobility. Eval Status: will issue HEP on first visit post op  Current: Pt reports continued daily compliance.   5/23/22, met     2. Patient will improve FOTO score by 15 points to improve functional tolerance for exercise. Eval Status: FOTO 26  Current:   49      5/6/22  FOTO score = an established functional score where 100 = no disability  Current: 67 GOAL MET 5/18/22      3. Patient will improve pain in right hip to 5/10 at worst to improve standing and ambulation tolerance and restore prior level of function. Eval Status: 8/10 at worst  Progress note: 5/10 at worst 4/22/22  Current: pain ranges from 0-4/10, patient states most of her pain occurs when sitting > 1 hour    5/6/22, met     4. Pt will have painfree right hip AROM WFL to aid in functional mechanics for ambulation/ADLs.   Eval Status:   Hip Left Right   Flexion 90 deg 63 deg P! Extension 0 deg 0 deg   ABD 25 deg 5 deg P! ER 20 deg 5 deg P! IR 15 deg 0 deg P! Reassessment   Hip Left Right   Flexion 90 deg 57 deg P! Extension 0 deg 0 deg   ABD 25 deg 9 deg P! ER 20 deg 5 deg P! IR 15 deg 0 deg P! Progress note: 5/18/22 GOAL MET   Hip Left Right   Flexion 90 deg 90   Extension 0 deg 10 deg   ABD 40 deg 40 deg   ER 20 deg 20 deg tight   IR 15 deg 0 deg         Long Term Goals: To be accomplished in 6 weeks:  1. Patient will improve FOTO score by 25 points to improve functional tolerance for walking for exercise and return to playing tennis. Eval Status: FOTO 26  Current: 49     5/6/22  FOTO score = an established functional score where 100 = no disability              Current: 67 GOAL MET 5/18/22      2.   Pt will ambulate with no AD, step through gait pattern with equal stance time and stride length bilaterally and no evidence of trendelenberg in order to establish normal gait mechanics for walking and eventual return to participation in tennis. Eval Status: step-to gait with significant reliance on UE support with AD, right trendelenberg with decreased stephanie, decreased stance time on right LE, stride length about 1/2 on right as compared to left LE; uses SPC in left UE primarily; amb              Reassessment: step-to gait with significant reliance on UE support with AD, right trendelenberg with decreased stephanie, decreased stance time on right LE, stride length about 1/2 on right as compared to left LE; uses RW               Progress note: step through gait no AD with mild trendelenburg and decreased hip extension during toe off 5/18/22              Current: strep through gait with no AD with slight lateral lean to right during right stance phase corrected with verbal cueing.  6/9/22 PROGRESSING        3.   Pt will have 5/5 bilateral LE strength to return to goals of playing tennis and getting into/out of backtub without difficulty or pain.   Eval Status:   Hip Left (1-5) Right (1-5)   Hip Flexion 5 4   Hip Extension 5 4   Hip ABD 5 3+   Hip ADD 5 4   Hip ER 4+ 3+   Hip IR 4+ 3+      Knee Left (1-5) Right (1-5)   Knee Flexion 4+ 4   Knee Extension 4+ 4   Ankle PF 4+ 4+   Ankle DF 4+ 4+   Other       Reassessment: 4/1/22  Hip Left (1-5) Right (1-5)   Hip Flexion 5 2   Hip Extension 5 4   Hip ABD 5 2   Hip ADD 5 2   Hip ER 4+ 2   Hip IR 4+ 2      Knee Left (1-5) Right (1-5)   Knee Flexion 4+ 2+   Knee Extension 4+ 3   Ankle PF 4+ 4   Ankle DF 4+ 4+   Other        Progress note: 5/18/22  Hip Left (1-5) Right (1-5)   Hip Flexion 5 5   Hip Extension 5 4+   Hip ABD 5 4   Hip ADD 5 4   Hip ER 5 5   Hip IR 4+ 3      Knee Left (1-5) Right (1-5)   Knee Flexion 4+ 4+   Knee Extension 4+ 4+   Ankle PF 4+ 4+   Ankle DF 5 5   Other        Progress note: 6/16/22  Hip Left (1-5) Right (1-5)   Hip Flexion 5 5   Hip Extension 5 5   Hip ABD 5 4+   Hip ADD 5 5   Hip ER 5 5   Hip IR 5 4      Knee Left (1-5) Right (1-5)   Knee Flexion 5 4+   Knee Extension 5 5   Ankle PF 4+ 4+   Ankle DF 5 5   Other             4.   Patient will improve pain in right hip to 0/10 at worst to improve prolonged standing and ambulation tolerance and restore prior level of function.              Eval Status: 8/10 at worst              Progress note: 5/10 at worst 4/22/22              Current: pain 1/10 at worst with swimming 6/16/22 PROGRESSING     Assessment/ Summary of Care: Patient tolerated treatment session well today. Patient had been treated by PT for 20 visits since 3/28/22 s/p Right BRITTANY. Pt has made good progress and has met all goals except pain and strength but is progressing well toward goals. Patient continues to have slight decreased right hip IR/abduction strength. Pt continues to have a slight lateral lean but can correct with verbal instruction. Pt will be DC to [facility] For continued strengthening and pain control.       RECOMMENDATIONS:  [x]Discontinue therapy: [x]Patient has reached or is progressing toward set goals      []Patient is non-compliant or has abdicated      []Due to lack of appreciable progress towards set goals    Jarod Cullen, PT 6/16/2022 3:51 PM

## 2023-08-15 ENCOUNTER — HOSPITAL ENCOUNTER (OUTPATIENT)
Facility: HOSPITAL | Age: 53
Setting detail: RECURRING SERIES
Discharge: HOME | End: 2023-08-18
Payer: COMMERCIAL

## 2023-08-15 PROCEDURE — 97162 PT EVAL MOD COMPLEX 30 MIN: CPT

## 2023-08-15 NOTE — PROGRESS NOTES
PT DAILY TREATMENT NOTE/Hip/Knee/Ankle EVAL 10-18      Patient Name: Bernabe Jackson    Date: 8/15/2023    : 1970  Insurance: Payor: Thor Silva / Plan: Alice Price / Product Type: *No Product type* /      Patient  verified yes     Visit #   Current / Total 1    Time   In / Out 1030 1110     TREATMENT AREA =  Left hip pain [M25.552]    SUBJECTIVE  Pain Level (0-10 scale): 4/10  [x]constant []intermittent []improving []worsening []no change since onset    Any medication changes, allergies to medications, adverse drug reactions, diagnosis change, or new procedure performed?: [x] No    [] Yes (see summary sheet for update)  Subjective functional status/changes:     PLOF: functionally independent, no AD, active lifestyle  Limitations to PLOF: pain, tingling  Mechanism of Injury: no specific mechanism of injury, insidious onset of left hip pain that has gotten progressively worse after doing home [employer] project in April and was on her anterior thigh and radiated up into left hip joint, primarily in groin; prior to this pt was playing pickleball and dancing without pain and she would like to return to this  Current symptoms/Complaints: 2/10 at best with laying down; 8/10 at worst with sit-stand  Previous Treatment/Compliance: taking ibuprofen and using heat; doing hip flexor stretches   PMHx/Surgical Hx: right posterior ANGELO; hysterectomy; 2 vaginal deliveries  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

## 2023-08-28 ENCOUNTER — HOSPITAL ENCOUNTER (OUTPATIENT)
Facility: HOSPITAL | Age: 53
Setting detail: RECURRING SERIES
Discharge: HOME | End: 2023-08-31
Payer: COMMERCIAL

## 2023-08-28 PROCEDURE — 97112 NEUROMUSCULAR REEDUCATION: CPT

## 2023-08-28 PROCEDURE — 97530 THERAPEUTIC ACTIVITIES: CPT

## 2023-08-28 PROCEDURE — 97110 THERAPEUTIC EXERCISES: CPT

## 2023-08-28 NOTE — PROGRESS NOTES
PHYSICAL / OCCUPATIONAL THERAPY - DAILY TREATMENT NOTE (updated )    Patient Name: Hayes Chapman    Date: 2023    : 1970  Insurance: Payor: Guanaco Durán / Plan: Valentino Hamming / Product Type: *No Product type* /      Patient  verified Yes     Visit #   Current / Total 2 12   Time   In / Out 0715 0755   Pain   In / Out 5 5   Subjective Functional Status/Changes: Pt pleasant, reports no new changes. She reports she went to the [facility] in [place] this past weekend and had increased pain from a lot of walking and standing but she reports she is on the mend now, just sore. Changes to: Allergies, Med Hx, Sx Hx?   no       TREATMENT AREA =  Left hip pain [M25.552]    OBJECTIVE    Therapeutic Procedures: Tx Min Billable or 1:1 Min (if diff from Tx Min) Procedure, Rationale, Specifics   15  76855 Therapeutic Exercise (timed):  increase ROM, strength, coordination, balance, and proprioception to improve patient's ability to progress to PLOF and address remaining functional goals. (see flow sheet as applicable)     Details if applicable:       15  31965 Neuromuscular Re-Education (timed):  improve balance, coordination, kinesthetic sense, posture, core stability and proprioception to improve patient's ability to develop conscious control of individual muscles and awareness of position of extremities in order to progress to PLOF and address remaining functional goals. (see flow sheet as applicable)     Details if applicable:     10  97284 Therapeutic Activity (timed):  use of dynamic activities replicating functional movements to increase ROM, strength, coordination, balance, and proprioception in order to improve patient's ability to progress to PLOF and address remaining functional goals.   (see flow sheet as applicable)     Details if applicable:            Details if applicable:            Details if applicable:     36  [facility] Totals Reminder: bill using total billable min of TIMED

## 2023-08-31 ENCOUNTER — HOSPITAL ENCOUNTER (OUTPATIENT)
Facility: HOSPITAL | Age: 53
Setting detail: RECURRING SERIES
End: 2023-08-31
Payer: COMMERCIAL

## 2023-08-31 PROCEDURE — 97110 THERAPEUTIC EXERCISES: CPT

## 2023-08-31 PROCEDURE — 97530 THERAPEUTIC ACTIVITIES: CPT

## 2023-08-31 PROCEDURE — 97140 MANUAL THERAPY 1/> REGIONS: CPT

## 2023-08-31 PROCEDURE — 97112 NEUROMUSCULAR REEDUCATION: CPT

## 2023-08-31 NOTE — PROGRESS NOTES
PHYSICAL / OCCUPATIONAL THERAPY - DAILY TREATMENT NOTE (updated )    Patient Name: Merlinda Frame    Date: 2023    : 1970  Insurance: Payor: Mat Camp / Plan: Jackie Genre / Product Type: *No Product type* /      Patient  verified Yes     Visit #   Current / Total 3 12   Time   In / Out 0710 0803   Pain   In / Out 4 4   Subjective Functional Status/Changes: Pt pleasant, reports she notices her sit-stands are not as painful. Changes to: Allergies, Med Hx, Sx Hx?   no       TREATMENT AREA =  Left hip pain [M25.552]    OBJECTIVE  0  Therapeutic Procedures: Tx Min Billable or 1:1 Min (if diff from Tx Min) Procedure, Rationale, Specifics   15  [id number] Neuromuscular Re-Education (timed):  improve balance, coordination, kinesthetic sense, posture, core stability and proprioception to improve patient's ability to develop conscious control of individual muscles and awareness of position of extremities in order to progress to PLOF and address remaining functional goals. (see flow sheet as applicable)     Details if applicable:       10  53327 Therapeutic Exercise (timed):  increase ROM, strength, coordination, balance, and proprioception to improve patient's ability to progress to PLOF and address remaining functional goals. (see flow sheet as applicable)     Details if applicable:     15  50985 Therapeutic Activity (timed):  use of dynamic activities replicating functional movements to increase ROM, strength, coordination, balance, and proprioception in order to improve patient's ability to progress to PLOF and address remaining functional goals. (see flow sheet as applicable)     Details if applicable:     13  67321 Manual Therapy (timed):  decrease pain, increase ROM, and increase tissue extensibility to improve patient's ability to progress to PLOF and address remaining functional goals.   The manual therapy interventions were performed at a separate and distinct time from the therapeutic

## 2023-09-05 ENCOUNTER — HOSPITAL ENCOUNTER (OUTPATIENT)
Facility: HOSPITAL | Age: 53
Setting detail: RECURRING SERIES
Discharge: HOME | End: 2023-09-08
Payer: COMMERCIAL

## 2023-09-05 PROCEDURE — 97110 THERAPEUTIC EXERCISES: CPT

## 2023-09-05 PROCEDURE — 97530 THERAPEUTIC ACTIVITIES: CPT

## 2023-09-05 PROCEDURE — 97112 NEUROMUSCULAR REEDUCATION: CPT

## 2023-09-05 NOTE — PROGRESS NOTES
functional mechanics for ambulation/ADLs. Eval Status: Special Tests:   **no special testing performed on right hip secondary to posterior hip replacement**          Hip:            Odessia [place]:              [] Right     [x] Left      [x] +    [] -                           Scour:              [] Right     [x] Left      [x] +    [] -                           Piriformis:        [] Right     [x] Left      [x] +    [] -                           Arsenio Test:  [] Right     [x] Left      [x] +    [] -                           Obers:             [] Right     [x] Left      [x] +    [] -     Long Term Goals: To be accomplished in 12 treatments:  Patient will improve FOTO score by 17 points to improve functional tolerance for sit-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 Eval Status: FOTO 47  FOTO score = an established functional score where 100 = no disability     2. Pt will have painfree left hip AROM WFL to aid in functional mechanics for ambulation/ADLs. Eval Status:   Hip Left   Flexion     Extension Lacking 8 deg   ABD     ER 10 P! IR 10 P!         3. Pt will have 5/5 bilateral LE strength to return to goals of playing pickleball. Eval Status:   Hip Left (1-5) Right (1-5)   Hip Flexion 4- 4   Hip Extension 4- 4-   Hip ABD 4- 4   Hip ER 3+ NT   Hip IR 3+ NT      Knee Left (1-5) Right (1-5)   Knee Flexion 4 4   Knee Extension 4- 4   Ankle PF 4+ 4+   Ankle DF 4+ 4+         4. Patient will improve pain in left hip to 0/10 at worst to improve dancing tolerance and restore prior level of function.   Eval Status: 8/10 at worst  Current: 4/10 today 9/5/23      PLAN  Yes  Continue plan of care  []  Upgrade activities as tolerated  []  Discharge due to :  []  Other:    Paloma iWlls PT    9/5/2023    7:09 AM    Future Appointments   Date Time Provider [address]   9/5/2023  7:10 AM Paloma Wills PT [facility]   9/8/2023  7:10 AM Alannah Singleton PT [facility]   9/11/2023  7:10 AM Alannah Singleton PT

## 2023-09-08 ENCOUNTER — HOSPITAL ENCOUNTER (OUTPATIENT)
Facility: HOSPITAL | Age: 53
Setting detail: RECURRING SERIES
Discharge: HOME | End: 2023-09-11
Payer: COMMERCIAL

## 2023-09-08 PROCEDURE — 97110 THERAPEUTIC EXERCISES: CPT

## 2023-09-08 PROCEDURE — 97140 MANUAL THERAPY 1/> REGIONS: CPT

## 2023-09-08 PROCEDURE — 97112 NEUROMUSCULAR REEDUCATION: CPT

## 2023-09-08 NOTE — PROGRESS NOTES
PHYSICAL / OCCUPATIONAL THERAPY - DAILY TREATMENT NOTE (updated )    Patient Name: Hayes Chapman    Date: 2023    : 1970  Insurance: Payor: Guanaco Durán / Plan: Valentino Hamming / Product Type: *No Product type* /      Patient  verified Yes     Visit #   Current / Total 5 12   Time   In / Out 710 750   Pain   In / Out 4 4   Subjective Functional Status/Changes: HEP is going well   Changes to: Allergies, Med Hx, Sx Hx?   no       TREATMENT AREA =  Left hip pain [M25.552]    OBJECTIVE    Therapeutic Procedures: Tx Min Billable or 1:1 Min (if diff from Tx Min) Procedure, Rationale, Specifics   10 10 16518 Manual Therapy (timed):  decrease pain, increase ROM, and increase tissue extensibility to improve patient's ability to progress to PLOF and address remaining functional goals. The manual therapy interventions were performed at a separate and distinct time from the therapeutic activities interventions . Details: inferior glides to left hip gr II-III in 90/90, then passive stretching for piriformis 10 x 10 sec    Details if applicable:       20  40700 Therapeutic Exercise (timed):  increase ROM, strength, coordination, balance, and proprioception to improve patient's ability to progress to PLOF and address remaining functional goals. (see flow sheet as applicable)    Details if applicable:     10 10 36711 Neuromuscular Re-Education (timed):  improve balance, coordination, kinesthetic sense, posture, core stability and proprioception to improve patient's ability to develop conscious control of individual muscles and awareness of position of extremities in order to progress to PLOF and address remaining functional goals.  (see flow sheet as applicable)     Details if applicable:     40 36 [address] [facility] Reminder: bill using total billable min of TIMED therapeutic procedures (example: do not include dry needle or estim unattended, both untimed codes, in totals to left)  8-22 min = 1 unit; 23-37

## 2023-09-11 ENCOUNTER — HOSPITAL ENCOUNTER (OUTPATIENT)
Facility: HOSPITAL | Age: 53
Setting detail: RECURRING SERIES
Discharge: HOME | End: 2023-09-14
Payer: COMMERCIAL

## 2023-09-11 PROCEDURE — 97112 NEUROMUSCULAR REEDUCATION: CPT

## 2023-09-11 PROCEDURE — 97530 THERAPEUTIC ACTIVITIES: CPT

## 2023-09-11 PROCEDURE — 97110 THERAPEUTIC EXERCISES: CPT

## 2023-09-11 NOTE — PROGRESS NOTES
(1-5)   Hip Flexion 4- 4   Hip Extension 4- 4-   Hip ABD 4- 4   Hip ER 3+ NT   Hip IR 3+ NT      Knee Left (1-5) Right (1-5)   Knee Flexion 4 4   Knee Extension 4- 4   Ankle PF 4+ 4+   Ankle DF 4+ 4+         4. Patient will improve pain in left hip to 0/10 at worst to improve dancing tolerance and restore prior level of function.   Eval Status: 8/10 at worst  Current: 4/10 today 9/5/23      PLAN  Yes  Continue plan of care  []  Upgrade activities as tolerated  []  Discharge due to :  []  Other:    Vinita Mena PT    9/11/2023    7:14 AM    Future Appointments   Date Time Provider [address]   9/14/2023  7:10 AM Vinita Mena PT [facility]   9/19/2023  5:10 PM Hilary Childers [facility]   9/22/2023  7:10 AM Vinita Mena PT [facility]

## 2023-09-14 ENCOUNTER — HOSPITAL ENCOUNTER (OUTPATIENT)
Facility: HOSPITAL | Age: 53
Setting detail: RECURRING SERIES
Discharge: HOME | End: 2023-09-17
Payer: COMMERCIAL

## 2023-09-14 PROCEDURE — 97112 NEUROMUSCULAR REEDUCATION: CPT

## 2023-09-14 PROCEDURE — 97110 THERAPEUTIC EXERCISES: CPT

## 2023-09-14 PROCEDURE — 97530 THERAPEUTIC ACTIVITIES: CPT

## 2023-09-14 NOTE — PROGRESS NOTES
[facility] [address] Yoel Lyles, [address] [facility]  Ph [phone]  Fx [phone]    Physical Therapy Progress Note  Patient name: Karime Forman Start of Care: 8/15/2023   Referral source: Dali Vee DO : 1970               Medical Diagnosis: Left hip pain [M25.552]    Onset Date:2023               Treatment Diagnosis: M25.552  LEFT HIP PAIN      Prior Hospitalization: see medical history Provider#: [id number]   Medications: Verified on Patient summary List   Comorbidities: right posterior ANGELO; hysterectomy; 2 vaginal deliveries  Prior Level of Function: functionally independent, no AD, active lifestyle    Visits from Start of Care: 7    Missed Visits: 0    Updated Goals/Measure of Progress: To be achieved in 5 treatments:    Short Term Goals: To be accomplished in 6 treatments:  Patient will be independent and compliant with HEP to progress toward goals and restore functional mobility. Eval Status: issued at eval  23 PN: compliant and independent met     Patient will improve FOTO score by 9 points to improve functional tolerance for exercise. Eval Status: FOTO 52  FOTO score = an established functional score where 100 = no disability   23 PN: 53 progressing      Patient will improve pain in left hip to 5/10 at worst to improve sit-stand tolerance and restore prior level of function. Eval Status: 8/10 at worst  Current: reports 5/10 at worst when going from sit-stand met 23     Pt will have painfree left hip flexibility WFL as demonstrated by negative special testing below to aid in functional mechanics for ambulation/ADLs.   Eval Status: Special Tests:   **no special testing performed on right hip secondary to posterior hip replacement**          Hip:            Melda [place]:              [] Right     [x] Left      [x] +    [] -                           Scour:              [] Right     [x] Left      [x] +    [] -                           Piriformis:
PN: progressing  Hip Left   Flexion     Extension Lacking 4 deg   ABD     ER 15   IR 15        3. Pt will have 5/5 bilateral LE strength to return to goals of playing pickleball. Eval Status:   Hip Left (1-5) Right (1-5)   Hip Flexion 4- 4   Hip Extension 4- 4-   Hip ABD 4- 4   Hip ER 3+ NT   Hip IR 3+ NT      Knee Left (1-5) Right (1-5)   Knee Flexion 4 4   Knee Extension 4- 4   Ankle PF 4+ 4+   Ankle DF 4+ 4+    9/14/23 PN: progressing  Hip Left (1-5) Right (1-5)   Hip Flexion 4 4   Hip Extension 4 4   Hip ABD 4 4   Hip ER 4- NT   Hip IR 4- NT      Knee Left (1-5) Right (1-5)   Knee Flexion 4+ 4+   Knee Extension 4+ 4+   Ankle PF 4+ 4+   Ankle DF 4+ 4+        4. Patient will improve pain in left hip to 0/10 at worst to improve dancing tolerance and restore prior level of function.   Eval Status: 8/10 at worst  9/14/23 PN: 3-4/10 today progressing      PLAN  Yes  Continue plan of care  []  Upgrade activities as tolerated  []  Discharge due to :  []  Other:    Michaela Bai PT    9/14/2023    7:38 AM    Future Appointments   Date Time Provider [address] [place] [address]   9/19/2023  5:10 PM Anisa Alejandre PT [facility]   9/22/2023  7:10 AM Michaela aBi PT [facility]

## 2023-09-19 ENCOUNTER — APPOINTMENT (OUTPATIENT)
Facility: HOSPITAL | Age: 53
End: 2023-09-19
Payer: COMMERCIAL

## 2023-09-20 ENCOUNTER — HOSPITAL ENCOUNTER (OUTPATIENT)
Facility: HOSPITAL | Age: 53
Setting detail: RECURRING SERIES
Discharge: HOME | End: 2023-09-23
Payer: COMMERCIAL

## 2023-09-20 PROCEDURE — 97112 NEUROMUSCULAR REEDUCATION: CPT

## 2023-09-20 PROCEDURE — 97110 THERAPEUTIC EXERCISES: CPT

## 2023-09-20 PROCEDURE — 97530 THERAPEUTIC ACTIVITIES: CPT

## 2023-09-20 NOTE — PROGRESS NOTES
min = 5 units   Total Total     TOTAL TREATMENT TIME:        38     [x]  Patient Education billed concurrently with other procedures   [x] Review HEP    [] Progressed/Changed HEP, detail:    [] Other detail:       Objective Information/Functional Measures/Assessment    Patient tolerated treatment session  well today. Patient continues to show decreased glut activation with ER during hip abduction. Pt continues with 3/10 pain in hip. Patient continues to make steady progress toward goals and would benefit from continued skilled PT intervention to address remaining deficits outlined in goals below. Patient will continue to benefit from skilled PT / OT services to modify and progress therapeutic interventions, analyze and address functional mobility deficits, analyze and address ROM deficits, analyze and address strength deficits, analyze and address soft tissue restrictions, analyze and cue for proper movement patterns, analyze and modify for postural abnormalities, analyze and address imbalance/dizziness, and instruct in home and community integration to address functional deficits and attain remaining goals. Progress toward goals / Updated goals:  []  See Progress Note/Recertification    Short Term Goals: To be accomplished in 6 treatments:  Patient will be independent and compliant with HEP to progress toward goals and restore functional mobility. Eval Status: issued at eval  9/14/23 PN: compliant and independent met     Patient will improve FOTO score by 9 points to improve functional tolerance for exercise. Eval Status: FOTO 52  FOTO score = an established functional score where 100 = no disability   9/14/23 PN: 53 progressing      Patient will improve pain in left hip to 5/10 at worst to improve sit-stand tolerance and restore prior level of function.   Eval Status: 8/10 at worst  Current: reports 5/10 at worst when going from sit-stand met 8/31/23     Pt will have painfree left hip flexibility WFL as

## 2023-09-22 ENCOUNTER — APPOINTMENT (OUTPATIENT)
Facility: HOSPITAL | Age: 53
End: 2023-09-22
Payer: COMMERCIAL

## 2023-09-25 ENCOUNTER — HOSPITAL ENCOUNTER (OUTPATIENT)
Facility: HOSPITAL | Age: 53
Setting detail: RECURRING SERIES
Discharge: HOME | End: 2023-09-28
Payer: COMMERCIAL

## 2023-09-25 PROCEDURE — 97530 THERAPEUTIC ACTIVITIES: CPT

## 2023-09-25 PROCEDURE — 97112 NEUROMUSCULAR REEDUCATION: CPT

## 2023-09-25 PROCEDURE — 97110 THERAPEUTIC EXERCISES: CPT

## 2023-09-25 NOTE — PROGRESS NOTES
Hip ABD 4- 4   Hip ER 3+ NT   Hip IR 3+ NT      Knee Left (1-5) Right (1-5)   Knee Flexion 4 4   Knee Extension 4- 4   Ankle PF 4+ 4+   Ankle DF 4+ 4+    9/14/23 PN: progressing  Hip Left (1-5) Right (1-5)   Hip Flexion 4 4   Hip Extension 4 4   Hip ABD 4 4   Hip ER 4- NT   Hip IR 4- NT      Knee Left (1-5) Right (1-5)   Knee Flexion 4+ 4+   Knee Extension 4+ 4+   Ankle PF 4+ 4+   Ankle DF 4+ 4+         4. Patient will improve pain in left hip to 0/10 at worst to improve dancing tolerance and restore prior level of function.   Eval Status: 8/10 at worst  9/14/23 PN: 3-4/10 today progressing  9/20/23: 3/10 today PROGRESSING       PLAN  Yes  Continue plan of care  []  Upgrade activities as tolerated  []  Discharge due to :  []  Other:    Luan Blanco PT    9/25/2023    7:39 AM    Future Appointments   Date Time Provider [address] [place] [address]   9/25/2023  8:30 AM Luan Blanco, PT [facility]   9/28/2023  4:30 PM Tyron Marcial, PT [facility]   10/6/2023  7:10 AM Alannah Singleton, PT [facility]

## 2023-09-28 ENCOUNTER — HOSPITAL ENCOUNTER (OUTPATIENT)
Facility: HOSPITAL | Age: 53
Setting detail: RECURRING SERIES
End: 2023-09-28
Payer: COMMERCIAL

## 2023-09-28 PROCEDURE — 97110 THERAPEUTIC EXERCISES: CPT

## 2023-09-28 PROCEDURE — 97530 THERAPEUTIC ACTIVITIES: CPT

## 2023-09-28 PROCEDURE — 97112 NEUROMUSCULAR REEDUCATION: CPT

## 2023-09-28 NOTE — PROGRESS NOTES
PHYSICAL / OCCUPATIONAL THERAPY - DAILY TREATMENT NOTE (updated )    Patient Name: Carlen Schilder    Date: 2023    : 1970  Insurance: Payor: Vito Sessions / Plan: Lisa Dietrichter / Product Type: *No Product type* /      Patient  verified Yes     Visit #   Current / Total 10 12   Time   In / Out 4:28 5:14   Pain   In / Out 4 4   Subjective Functional Status/Changes: Patient states she still has pain when she walks, including short-term walk and pain especially increases with prolonged walking. \"But, I have noticed a really good difference in my sit to stands, which I'm pleased with. \"   Changes to: Allergies, Med Hx, Sx Hx?   no       TREATMENT AREA =  Left hip pain [M25.552]    OBJECTIVE      Therapeutic Procedures: Tx Min Billable or 1:1 Min (if diff from Tx Min) Procedure, Rationale, Specifics   13  74361 Therapeutic Exercise (timed):  increase ROM, strength, coordination, balance, and proprioception to improve patient's ability to progress to PLOF and address remaining functional goals. (see flow sheet as applicable)    Details if applicable:       15  89712 Therapeutic Activity (timed):  use of dynamic activities replicating functional movements to increase ROM, strength, coordination, balance, and proprioception in order to improve patient's ability to progress to PLOF and address remaining functional goals. (see flow sheet as applicable)    Details if applicable:     18  00238 Neuromuscular Re-Education (timed):  improve balance, coordination, kinesthetic sense, posture, core stability and proprioception to improve patient's ability to develop conscious control of individual muscles and awareness of position of extremities in order to progress to PLOF and address remaining functional goals.  (see flow sheet as applicable)     Details if applicable:     55  [facility] Totals Reminder: bill using total billable min of TIMED therapeutic procedures (example: do not include dry needle or estim

## 2023-10-06 ENCOUNTER — HOSPITAL ENCOUNTER (OUTPATIENT)
Facility: HOSPITAL | Age: 53
Setting detail: RECURRING SERIES
Discharge: HOME | End: 2023-10-09
Payer: COMMERCIAL

## 2023-10-06 PROCEDURE — 97112 NEUROMUSCULAR REEDUCATION: CPT

## 2023-10-06 PROCEDURE — 97530 THERAPEUTIC ACTIVITIES: CPT

## 2023-10-06 PROCEDURE — 97110 THERAPEUTIC EXERCISES: CPT

## 2023-10-06 NOTE — PROGRESS NOTES
unit; 23-37 min = 2 units; 38-52 min = 3 units; 53-67 min = 4 units; 68-82 min = 5 units   Total Total     TOTAL TREATMENT TIME:        53     [x]  Patient Education billed concurrently with other procedures   [x] Review HEP    [] Progressed/Changed HEP, detail:    [] Other detail:       Objective Information/Functional Measures/Assessment    Patient tolerated treatment session well today. Patient had no complaints with addition of clamshell to exercise program to accomplish improved glute strength. Patient continues to make good progress toward goals and would benefit from continued skilled PT intervention to address remaining deficits outlined in goals below. Patient will continue to benefit from skilled PT / OT services to modify and progress therapeutic interventions, analyze and address functional mobility deficits, analyze and address ROM deficits, analyze and address strength deficits, analyze and address soft tissue restrictions, analyze and cue for proper movement patterns, and analyze and modify for postural abnormalities to address functional deficits and attain remaining goals. Progress toward goals / Updated goals:  []  See Progress Note/Recertification    Short Term Goals: To be accomplished in 6 treatments:  Patient will be independent and compliant with HEP to progress toward goals and restore functional mobility. Eval Status: issued at eval  9/14/23 PN: compliant and independent met     Patient will improve FOTO score by 9 points to improve functional tolerance for exercise. Eval Status: FOTO 52  FOTO score = an established functional score where 100 = no disability   9/14/23 PN: 53 progressing      Patient will improve pain in left hip to 5/10 at worst to improve sit-stand tolerance and restore prior level of function.   Eval Status: 8/10 at worst  Current: reports 5/10 at worst when going from sit-stand met 8/31/23     Pt will have painfree left hip flexibility WFL as demonstrated by

## 2023-10-12 ENCOUNTER — HOSPITAL ENCOUNTER (OUTPATIENT)
Facility: HOSPITAL | Age: 53
Setting detail: RECURRING SERIES
Discharge: HOME | End: 2023-10-15
Payer: COMMERCIAL

## 2023-10-12 PROCEDURE — 97535 SELF CARE MNGMENT TRAINING: CPT

## 2023-10-12 PROCEDURE — 97530 THERAPEUTIC ACTIVITIES: CPT

## 2023-10-12 NOTE — PROGRESS NOTES
PHYSICAL / OCCUPATIONAL THERAPY - DAILY TREATMENT NOTE (updated )    Patient Name: Bg Query    Date: 10/12/2023    : 1970  Insurance: Payor: Nya Levy / Plan: Trinidad Faith / Product Type: *No Product type* /      Patient  verified Yes     Visit #   Current / Total 12 24   Time   In / Out 0710 0740   Pain   In / Out 5 5   Subjective Functional Status/Changes: Pt pleasant, reports no new changes, just continued pain in hip joint. She reports her groin pain has improved but that she has arthritic pain that is very limiting. Changes to: Allergies, Med Hx, Sx Hx?   no       TREATMENT AREA =  Left hip pain [M25.552]    OBJECTIVE    Therapeutic Procedures: Tx Min Billable or 1:1 Min (if diff from Tx Min) Procedure, Rationale, Specifics   15  13317 Therapeutic Activity (timed):  use of dynamic activities replicating functional movements to increase ROM, strength, coordination, balance, and proprioception in order to improve patient's ability to progress to PLOF and address remaining functional goals. (see flow sheet as applicable)    Details if applicable:       15  06357 Self Care/Home Management (timed):  improve patient knowledge and understanding of diagnosis/prognosis and physical therapy expectations, procedures and progression  to improve patient's ability to progress to PLOF and address remaining functional goals.   (see flow sheet as applicable)    Details if applicable:            Details if applicable:           Details if applicable:            Details if applicable:     27  [facility] Totals Reminder: bill using total billable min of TIMED therapeutic procedures (example: do not include dry needle or estim unattended, both untimed codes, in totals to left)  8-22 min = 1 unit; 23-37 min = 2 units; 38-52 min = 3 units; 53-67 min = 4 units; 68-82 min = 5 units   Total Total     TOTAL TREATMENT TIME:        30     [x]  Patient Education billed concurrently with other procedures   [x]

## 2023-10-12 NOTE — PROGRESS NOTES
[facility] [address]  [facility], [address] [facility] [place]  Ph [phone]  Fx [phone]    Physical Therapy Progress Note  Patient name: Lizet Shepherd Start of Care: 8/15/2023   Referral source: James Hughes DO : 1970               Medical Diagnosis: Left hip pain [M25.552]    Onset Date:2023               Treatment Diagnosis: M25.552  LEFT HIP PAIN      Prior Hospitalization: see medical history Provider#: [id number]   Medications: Verified on Patient summary List   Comorbidities: right posterior ANGELO; hysterectomy; 2 vaginal deliveries  Prior Level of Function: functionally independent, no AD, active lifestyle         Visits from Start of Care: 12    Missed Visits: 0    Updated Goals/Measure of Progress: To be achieved in 12 treatments:    Short Term Goals: To be accomplished in 6 treatments:  Patient will be independent and compliant with HEP to progress toward goals and restore functional mobility. Eval Status: issued at eval  23 PN: compliant and independent met     Patient will improve FOTO score by 9 points to improve functional tolerance for exercise. Eval Status: FOTO 52  FOTO score = an established functional score where 100 = no disability   23 PN: 53 progressing   10/12/23 PN: 63 met     Patient will improve pain in left hip to 5/10 at worst to improve sit-stand tolerance and restore prior level of function. Eval Status: 8/10 at worst  Current: reports 5/10 at worst when going from sit-stand met 23     Pt will have painfree left hip flexibility WFL as demonstrated by negative special testing below to aid in functional mechanics for ambulation/ADLs.   Eval Status: Special Tests:   **no special testing performed on right hip secondary to posterior hip replacement**          Hip:            Odessia [place]:              [] Right     [x] Left      [x] +    [] -                           Scour:              [] Right     [x] Left      [x] +    [] -

## 2023-10-24 ENCOUNTER — HOSPITAL ENCOUNTER (OUTPATIENT)
Facility: HOSPITAL | Age: 53
Setting detail: RECURRING SERIES
Discharge: HOME | End: 2023-10-27
Payer: COMMERCIAL

## 2023-10-24 PROCEDURE — 97110 THERAPEUTIC EXERCISES: CPT

## 2023-10-24 PROCEDURE — 97112 NEUROMUSCULAR REEDUCATION: CPT

## 2023-10-24 PROCEDURE — 97530 THERAPEUTIC ACTIVITIES: CPT

## 2023-10-24 NOTE — PROGRESS NOTES
[facility]  [address] [facility] [address], [address] [facility]  Ph [phone]  Fx [phone]      Continued Plan of Care/ Re-certification for Physical Therapy Services    Patient name: Jermaine Lorenz Start of Care: 8/15/2023   Referral source: Woody Galaviz DO : 1970               Medical Diagnosis: Left hip pain [M25.552]    Onset Date:2023               Treatment Diagnosis: M25.552  LEFT HIP PAIN      Prior Hospitalization: see medical history Provider#: [id number]   Medications: Verified on Patient summary List   Comorbidities: right posterior ANGELO; hysterectomy; 2 vaginal deliveries  Prior Level of Function: functionally independent, no AD, active lifestyle     Visits from Start of Care: 13    Missed Visits: 0    Reporting Period: 8/15/23 to 10/24/23    The Plan of Care and following information is based on the patient's current status:    Key functional changes: improved strength, flexibility and pain levels since [facility]      Problems/ barriers to goal attainment: possible labral tear per MD - needs followup with imaging and MD     Problem List: pain affecting function, decrease ROM, decrease strength, edema affection function, impaired gait/balance, decrease ADL/functional abilities, decrease activity tolerance, decrease flexibility/joint mobility, and decrease transfer abilities     Treatment Plan: Therapeutic exercise, Neuromuscular reeducation, Manual therapy, Therapeutic activity, Self care/home management, Vasopneumatic device, and Gait training     Goals for this certification period to be accomplished in 4 treatmentsShort Term Goals: To be accomplished in 6 treatments:  Patient will be independent and compliant with HEP to progress toward goals and restore functional mobility. Eval Status: issued at eval  23 PN: compliant and independent met     Patient will improve FOTO score by 9 points to improve functional tolerance for exercise.    Eval Status: [address]
total billable min of TIMED therapeutic procedures (example: do not include dry needle or estim unattended, both untimed codes, in totals to left)  8-22 min = 1 unit; 23-37 min = 2 units; 38-52 min = 3 units; 53-67 min = 4 units; 68-82 min = 5 units   Total Total     TOTAL TREATMENT TIME:        40     [x]  Patient Education billed concurrently with other procedures   [x] Review HEP    [] Progressed/Changed HEP, detail:    [] Other detail:       Objective Information/Functional Measures/Assessment    Patient tolerated treatment session well today. Patient had no complaints with addition of articulated cat to exercise program to improve pelvic girdle posture. Patient continues to make good progress toward goals and would benefit from continued skilled PT intervention to address remaining deficits outlined in goals below. Patient will continue to benefit from skilled PT / OT services to modify and progress therapeutic interventions, analyze and address functional mobility deficits, analyze and address ROM deficits, analyze and address strength deficits, analyze and address soft tissue restrictions, analyze and cue for proper movement patterns, analyze and modify for postural abnormalities, and analyze and address imbalance/dizziness to address functional deficits and attain remaining goals. Progress toward goals / Updated goals:  [x]  See Progress Note/Recertification    Short Term Goals: To be accomplished in 6 treatments:  Patient will be independent and compliant with HEP to progress toward goals and restore functional mobility. Eval Status: issued at eval  9/14/23 PN: compliant and independent met     Patient will improve FOTO score by 9 points to improve functional tolerance for exercise.    Eval Status: FOTO 52  FOTO score = an established functional score where 100 = no disability   9/14/23 PN: 53 progressing   10/12/23 PN: 63 met     Patient will improve pain in left hip to 5/10 at worst to improve

## 2023-10-31 ENCOUNTER — HOSPITAL ENCOUNTER (OUTPATIENT)
Facility: HOSPITAL | Age: 53
Setting detail: RECURRING SERIES
Discharge: HOME | End: 2023-11-03
Payer: COMMERCIAL

## 2023-10-31 PROCEDURE — 97110 THERAPEUTIC EXERCISES: CPT

## 2023-10-31 PROCEDURE — 97530 THERAPEUTIC ACTIVITIES: CPT

## 2023-10-31 PROCEDURE — 97535 SELF CARE MNGMENT TRAINING: CPT

## 2023-10-31 PROCEDURE — 97112 NEUROMUSCULAR REEDUCATION: CPT

## 2023-10-31 NOTE — PROGRESS NOTES
PHYSICAL / OCCUPATIONAL THERAPY - DAILY TREATMENT NOTE (updated )    Patient Name: Yaquelin Gonzalez    Date: 10/31/2023    : 1970  Insurance: Payor: Robin Awan / Plan: Radha Alberto / Product Type: *No Product type* /      Patient  verified Yes     Visit #   Current / Total 14 24   Time   In / Out 0715 0810   Pain   In / Out 5 4   Subjective Functional Status/Changes: Pt pleasant, reports no new changes today. She reports this thinks her pain might be a little more \"twingy\" from the change in weather. Changes to: Allergies, Med Hx, Sx Hx? yes       TREATMENT AREA =  Left hip pain [M25.552]    OBJECTIVE      Therapeutic Procedures: Tx Min Billable or 1:1 Min (if diff from Tx Min) Procedure, Rationale, Specifics   15  03923 Therapeutic Exercise (timed):  increase ROM, strength, coordination, balance, and proprioception to improve patient's ability to progress to PLOF and address remaining functional goals. (see flow sheet as applicable)    Details if applicable:       15  14937 Neuromuscular Re-Education (timed):  improve balance, coordination, kinesthetic sense, posture, core stability and proprioception to improve patient's ability to develop conscious control of individual muscles and awareness of position of extremities in order to progress to PLOF and address remaining functional goals. (see flow sheet as applicable)    Details if applicable:     15  05883 Therapeutic Activity (timed):  use of dynamic activities replicating functional movements to increase ROM, strength, coordination, balance, and proprioception in order to improve patient's ability to progress to PLOF and address remaining functional goals.   (see flow sheet as applicable)     Details if applicable:     10  11673 Self Care/Home Management (timed):  improve patient knowledge and understanding of pain reducing techniques and posture/ergonomics  to improve patient's ability to progress to PLOF and address remaining

## 2023-11-03 ENCOUNTER — HOSPITAL ENCOUNTER (OUTPATIENT)
Facility: HOSPITAL | Age: 53
Setting detail: RECURRING SERIES
Discharge: HOME | End: 2023-11-06
Payer: COMMERCIAL

## 2023-11-03 PROCEDURE — 97110 THERAPEUTIC EXERCISES: CPT

## 2023-11-03 PROCEDURE — 97530 THERAPEUTIC ACTIVITIES: CPT

## 2023-11-03 PROCEDURE — 97112 NEUROMUSCULAR REEDUCATION: CPT

## 2023-11-03 NOTE — PROGRESS NOTES
reports 5/10 at worst when going from sit-stand met 8/31/23     Pt will have painfree left hip flexibility WFL as demonstrated by negative special testing below to aid in functional mechanics for ambulation/ADLs. Eval Status: Special Tests:   **no special testing performed on right hip secondary to posterior hip replacement**          Hip:            Loyde Fury:              [] Right     [x] Left      [x] +    [] -                           Scour:              [] Right     [x] Left      [x] +    [] -                           Piriformis:        [] Right     [x] Left      [x] +    [] -                           Arsenio Test:  [] Right     [x] Left      [x] +    [] -                           Obers:             [] Right     [x] Left      [x] +    [] -   9/14/23 PN:  Special Tests:   **no special testing performed on right hip secondary to posterior hip replacement**          Hip:            Loyde Fury:              [] Right     [x] Left      [x] +    [] - *improved*                          Scour:              [] Right     [x] Left      [x] +    [] -                           Piriformis:        [] Right     [x] Left      [x] +    [] -                           Arsenio Test:  [] Right     [x] Left      [] +    [x] -                           Obers:             [] Right     [x] Left      [] +    [x] -   10/12/23 PN:  Special Tests: progressing  **no special testing performed on right hip secondary to posterior hip replacement**          Hip:            Loyde Fury:              [] Right     [x] Left      [x] +    [] - *improved*                          Scour:              [] Right     [x] Left      [x] +    [] -                           Piriformis:        [] Right     [x] Left      [x] +    [] - *improved*                          Arsenio Test:  [] Right     [x] Left      [] +    [x] -                           Obers:             [] Right     [x] Left      [] +    [x] -     Long Term Goals:  To be accomplished in 12 treatments:  Patient

## 2023-11-10 ENCOUNTER — HOSPITAL ENCOUNTER (OUTPATIENT)
Facility: HOSPITAL | Age: 53
Setting detail: RECURRING SERIES
Discharge: HOME | End: 2023-11-13
Payer: COMMERCIAL

## 2023-11-10 PROCEDURE — 97112 NEUROMUSCULAR REEDUCATION: CPT

## 2023-11-10 PROCEDURE — 97530 THERAPEUTIC ACTIVITIES: CPT

## 2023-11-10 PROCEDURE — 97110 THERAPEUTIC EXERCISES: CPT

## 2023-11-10 NOTE — PROGRESS NOTES
PHYSICAL / OCCUPATIONAL THERAPY - DAILY TREATMENT NOTE (updated )    Patient Name: Apolonia Leiva    Date: 11/10/2023    : 1970  Insurance: Payor: Melissa Smiley / Plan: London Ulloa / Product Type: *No Product type* /      Patient  verified Yes     Visit #   Current / Total 16 24   Time   In / Out 0835 0930   Pain   In / Out 5 4   Subjective Functional Status/Changes: Pt pleasant, reports she feels a bit less sore today but she didn't have as much active functional activity to do this week so she feels rested. Changes to: Allergies, Med Hx, Sx Hx?   no       TREATMENT AREA =  Left hip pain [M25.552]    OBJECTIVE      Therapeutic Procedures: Tx Min Billable or 1:1 Min (if diff from Tx Min) Procedure, Rationale, Specifics   15  98131 Therapeutic Exercise (timed):  increase ROM, strength, coordination, balance, and proprioception to improve patient's ability to progress to PLOF and address remaining functional goals. (see flow sheet as applicable)    Details if applicable:       25  81420 Neuromuscular Re-Education (timed):  improve balance, coordination, kinesthetic sense, posture, core stability and proprioception to improve patient's ability to develop conscious control of individual muscles and awareness of position of extremities in order to progress to PLOF and address remaining functional goals. (see flow sheet as applicable)    Details if applicable:     15  83213 Therapeutic Activity (timed):  use of dynamic activities replicating functional movements to increase ROM, strength, coordination, balance, and proprioception in order to improve patient's ability to progress to PLOF and address remaining functional goals.   (see flow sheet as applicable)     Details if applicable:           Details if applicable:            Details if applicable:     [address] Totals Reminder: bill using total billable min of TIMED therapeutic procedures (example: do not include dry needle or estim

## 2024-02-29 ENCOUNTER — TRANSCRIBE ORDERS (OUTPATIENT)
Facility: HOSPITAL | Age: 54
End: 2024-02-29

## 2024-02-29 ENCOUNTER — HOSPITAL ENCOUNTER (OUTPATIENT)
Facility: HOSPITAL | Age: 54
End: 2024-02-29
Payer: OTHER GOVERNMENT

## 2024-02-29 ENCOUNTER — HOSPITAL ENCOUNTER (OUTPATIENT)
Facility: HOSPITAL | Age: 54
Discharge: HOME | End: 2024-02-29
Payer: OTHER GOVERNMENT

## 2024-02-29 DIAGNOSIS — M16.12 OSTEOARTHRITIS OF LEFT HIP, UNSPECIFIED OSTEOARTHRITIS TYPE: ICD-10-CM

## 2024-02-29 DIAGNOSIS — M16.12 OSTEOARTHRITIS OF LEFT HIP, UNSPECIFIED OSTEOARTHRITIS TYPE: Primary | ICD-10-CM

## 2024-02-29 LAB
ANION GAP SERPL CALC-SCNC: 3 MMOL/L (ref 3–18)
APPEARANCE UR: NORMAL
APTT PPP: 26.9 SEC (ref 23–36.4)
BASOPHILS # BLD: 0.1 K/UL (ref 0–0.1)
BASOPHILS NFR BLD: 1 % (ref 0–2)
BILIRUB UR QL: NEGATIVE
BUN SERPL-MCNC: 14 MG/DL (ref 7–18)
BUN/CREAT SERPL: 18 (ref 12–20)
CALCIUM SERPL-MCNC: 9.6 MG/DL (ref 8.5–10.1)
CHLORIDE SERPL-SCNC: 108 MMOL/L (ref 100–111)
CO2 SERPL-SCNC: 29 MMOL/L (ref 21–32)
COLOR UR: YELLOW
CREAT SERPL-MCNC: 0.78 MG/DL (ref 0.6–1.3)
DIFFERENTIAL METHOD BLD: ABNORMAL
EOSINOPHIL # BLD: 0.4 K/UL (ref 0–0.4)
EOSINOPHIL NFR BLD: 5 % (ref 0–5)
ERYTHROCYTE [DISTWIDTH] IN BLOOD BY AUTOMATED COUNT: 12.4 % (ref 11.6–14.5)
EST. AVERAGE GLUCOSE BLD GHB EST-MCNC: 128 MG/DL
GLUCOSE SERPL-MCNC: 116 MG/DL (ref 74–99)
GLUCOSE UR STRIP.AUTO-MCNC: NEGATIVE MG/DL
HBA1C MFR BLD: 6.1 % (ref 4.2–5.6)
HCT VFR BLD AUTO: 43 % (ref 35–45)
HGB BLD-MCNC: 14.3 G/DL (ref 12–16)
HGB UR QL STRIP: NEGATIVE
IMM GRANULOCYTES # BLD AUTO: 0.1 K/UL (ref 0–0.04)
IMM GRANULOCYTES NFR BLD AUTO: 1 % (ref 0–0.5)
INR PPP: 1 (ref 0.9–1.1)
KETONES UR QL STRIP.AUTO: NEGATIVE MG/DL
LEUKOCYTE ESTERASE UR QL STRIP.AUTO: NEGATIVE
LYMPHOCYTES # BLD: 2.4 K/UL (ref 0.9–3.6)
LYMPHOCYTES NFR BLD: 29 % (ref 21–52)
MCH RBC QN AUTO: 29.8 PG (ref 24–34)
MCHC RBC AUTO-ENTMCNC: 33.3 G/DL (ref 31–37)
MCV RBC AUTO: 89.6 FL (ref 78–100)
MONOCYTES # BLD: 0.7 K/UL (ref 0.05–1.2)
MONOCYTES NFR BLD: 8 % (ref 3–10)
NEUTS SEG # BLD: 4.7 K/UL (ref 1.8–8)
NEUTS SEG NFR BLD: 56 % (ref 40–73)
NITRITE UR QL STRIP.AUTO: NEGATIVE
NRBC # BLD: 0 K/UL (ref 0–0.01)
NRBC BLD-RTO: 0 PER 100 WBC
PH UR STRIP: 6.5 (ref 5–8)
PLATELET # BLD AUTO: 313 K/UL (ref 135–420)
PMV BLD AUTO: 8.5 FL (ref 9.2–11.8)
POTASSIUM SERPL-SCNC: 4.2 MMOL/L (ref 3.5–5.5)
PROT UR STRIP-MCNC: NEGATIVE MG/DL
PROTHROMBIN TIME: 13.7 SEC (ref 11.9–14.7)
RBC # BLD AUTO: 4.8 M/UL (ref 4.2–5.3)
SODIUM SERPL-SCNC: 140 MMOL/L (ref 136–145)
SP GR UR REFRACTOMETRY: 1.02 (ref 1–1.03)
UROBILINOGEN UR QL STRIP.AUTO: 0.2 EU/DL (ref 0.2–1)
WBC # BLD AUTO: 8.4 K/UL (ref 4.6–13.2)

## 2024-02-29 PROCEDURE — 71045 X-RAY EXAM CHEST 1 VIEW: CPT

## 2024-02-29 PROCEDURE — 85730 THROMBOPLASTIN TIME PARTIAL: CPT

## 2024-02-29 PROCEDURE — 81003 URINALYSIS AUTO W/O SCOPE: CPT

## 2024-02-29 PROCEDURE — 87086 URINE CULTURE/COLONY COUNT: CPT

## 2024-02-29 PROCEDURE — 80048 BASIC METABOLIC PNL TOTAL CA: CPT

## 2024-02-29 PROCEDURE — 85025 COMPLETE CBC W/AUTO DIFF WBC: CPT

## 2024-02-29 PROCEDURE — 36415 COLL VENOUS BLD VENIPUNCTURE: CPT

## 2024-02-29 PROCEDURE — 83036 HEMOGLOBIN GLYCOSYLATED A1C: CPT

## 2024-02-29 PROCEDURE — 85610 PROTHROMBIN TIME: CPT

## 2024-02-29 PROCEDURE — 93005 ELECTROCARDIOGRAM TRACING: CPT

## 2024-03-01 LAB
BACTERIA SPEC CULT: NORMAL
CC UR VC: NORMAL
SERVICE CMNT-IMP: NORMAL
SERVICE CMNT-IMP: NORMAL

## 2024-03-02 LAB
EKG ATRIAL RATE: 85 BPM
EKG DIAGNOSIS: NORMAL
EKG P AXIS: 76 DEGREES
EKG P-R INTERVAL: 170 MS
EKG Q-T INTERVAL: 352 MS
EKG QRS DURATION: 86 MS
EKG QTC CALCULATION (BAZETT): 418 MS
EKG R AXIS: 73 DEGREES
EKG T AXIS: 44 DEGREES
EKG VENTRICULAR RATE: 85 BPM

## 2024-03-05 ENCOUNTER — ANESTHESIA EVENT (OUTPATIENT)
Facility: HOSPITAL | Age: 54
DRG: 470 | End: 2024-03-05
Payer: OTHER GOVERNMENT

## 2024-03-05 NOTE — PERIOP NOTE
PAT Activity Status Questionnaire       Yes No Points for YES    Are you able to climb a flight of stairs or walk up a hill? [x] [] 1   Are you able to do heavy work around the house like lifting or moving heavy furniture? [] [x] 1   Do yardwork like raking leaves, weeding or pushing a power mower? [] [x] 1   Participate in strenuous sports like swimming, singles tennis, football, basketball or skiing? [] [x] 1   Total Score:        1       If TOTAL SCORE is <3, send chart for anesthesia review.    Bring CPAP on DOS. No removable prosthetic devices or family history of malignant hyperthermia. CHG wash x 3 days instructions reviewed. PCP is aware of the surgery. No participation in clinical trial or research study. Do not bring any valuables on DOS- jewelry, wallet, cash, laptop, medications.  Does not meet criteria for special population at this time. Possible time delay day of surgery reviewed. Instructed to bring Insurance card and ID on the day of surgery.  DNR status-none. Stop vitamins 3-.

## 2024-03-19 ENCOUNTER — HOSPITAL ENCOUNTER (OUTPATIENT)
Facility: HOSPITAL | Age: 54
Discharge: HOME | End: 2024-03-22
Payer: OTHER GOVERNMENT

## 2024-03-19 DIAGNOSIS — Z01.818 PRE-OP TESTING: ICD-10-CM

## 2024-03-19 LAB
APPEARANCE UR: CLEAR
BILIRUB UR QL: NEGATIVE
COLOR UR: YELLOW
GLUCOSE UR STRIP.AUTO-MCNC: NEGATIVE MG/DL
HGB UR QL STRIP: NEGATIVE
KETONES UR QL STRIP.AUTO: NEGATIVE MG/DL
LEUKOCYTE ESTERASE UR QL STRIP.AUTO: NEGATIVE
NITRITE UR QL STRIP.AUTO: NEGATIVE
PH UR STRIP: 6 (ref 5–8)
PROT UR STRIP-MCNC: NEGATIVE MG/DL
SP GR UR REFRACTOMETRY: 1.01 (ref 1–1.03)
UROBILINOGEN UR QL STRIP.AUTO: 0.2 EU/DL (ref 0.2–1)

## 2024-03-19 PROCEDURE — 87086 URINE CULTURE/COLONY COUNT: CPT

## 2024-03-19 PROCEDURE — 81003 URINALYSIS AUTO W/O SCOPE: CPT

## 2024-03-20 LAB
BACTERIA SPEC CULT: NORMAL
CC UR VC: NORMAL
SERVICE CMNT-IMP: NORMAL

## 2024-03-26 RX ORDER — INSULIN LISPRO 100 [IU]/ML
0-8 INJECTION, SOLUTION INTRAVENOUS; SUBCUTANEOUS ONCE
Status: CANCELLED | OUTPATIENT
Start: 2024-03-26 | End: 2024-03-26

## 2024-03-26 RX ORDER — SODIUM CHLORIDE 0.9 % (FLUSH) 0.9 %
5-40 SYRINGE (ML) INJECTION PRN
Status: CANCELLED | OUTPATIENT
Start: 2024-03-26

## 2024-03-26 RX ORDER — DEXTROSE MONOHYDRATE 100 MG/ML
INJECTION, SOLUTION INTRAVENOUS CONTINUOUS PRN
Status: CANCELLED | OUTPATIENT
Start: 2024-03-26

## 2024-03-26 RX ORDER — SODIUM CHLORIDE 0.9 % (FLUSH) 0.9 %
5-40 SYRINGE (ML) INJECTION EVERY 12 HOURS SCHEDULED
Status: CANCELLED | OUTPATIENT
Start: 2024-03-26

## 2024-03-26 RX ORDER — ACETAMINOPHEN 500 MG
1000 TABLET ORAL ONCE
Status: CANCELLED | OUTPATIENT
Start: 2024-03-26 | End: 2024-03-26

## 2024-03-26 RX ORDER — SODIUM CHLORIDE 9 MG/ML
INJECTION, SOLUTION INTRAVENOUS PRN
Status: CANCELLED | OUTPATIENT
Start: 2024-03-26

## 2024-03-27 ENCOUNTER — APPOINTMENT (OUTPATIENT)
Facility: HOSPITAL | Age: 54
DRG: 470 | End: 2024-03-27
Attending: ORTHOPAEDIC SURGERY
Payer: OTHER GOVERNMENT

## 2024-03-27 ENCOUNTER — ANESTHESIA (OUTPATIENT)
Facility: HOSPITAL | Age: 54
DRG: 470 | End: 2024-03-27
Payer: OTHER GOVERNMENT

## 2024-03-27 ENCOUNTER — HOSPITAL ENCOUNTER (INPATIENT)
Facility: HOSPITAL | Age: 54
LOS: 1 days | Discharge: HOME | DRG: 470 | End: 2024-03-27
Attending: ORTHOPAEDIC SURGERY | Admitting: ORTHOPAEDIC SURGERY
Payer: OTHER GOVERNMENT

## 2024-03-27 VITALS
SYSTOLIC BLOOD PRESSURE: 127 MMHG | HEART RATE: 98 BPM | RESPIRATION RATE: 16 BRPM | TEMPERATURE: 97.9 F | WEIGHT: 238.4 LBS | BODY MASS INDEX: 40.7 KG/M2 | DIASTOLIC BLOOD PRESSURE: 64 MMHG | HEIGHT: 64 IN | OXYGEN SATURATION: 94 %

## 2024-03-27 DIAGNOSIS — M16.12 OSTEOARTHRITIS OF LEFT HIP, UNSPECIFIED OSTEOARTHRITIS TYPE: ICD-10-CM

## 2024-03-27 DIAGNOSIS — Z01.818 PRE-OP TESTING: Primary | ICD-10-CM

## 2024-03-27 DIAGNOSIS — M16.12 UNILATERAL PRIMARY OSTEOARTHRITIS, LEFT HIP: ICD-10-CM

## 2024-03-27 LAB
ABO + RH BLD: NORMAL
APPEARANCE UR: CLEAR
BACTERIA URNS QL MICRO: ABNORMAL /HPF
BILIRUB UR QL: NEGATIVE
BLOOD GROUP ANTIBODIES SERPL: NORMAL
COLOR UR: YELLOW
EPITH CASTS URNS QL MICRO: ABNORMAL /LPF (ref 0–5)
GLUCOSE BLD STRIP.AUTO-MCNC: 107 MG/DL (ref 70–110)
GLUCOSE BLD STRIP.AUTO-MCNC: 144 MG/DL (ref 70–110)
GLUCOSE UR STRIP.AUTO-MCNC: NEGATIVE MG/DL
HGB UR QL STRIP: NEGATIVE
KETONES UR QL STRIP.AUTO: NEGATIVE MG/DL
LEUKOCYTE ESTERASE UR QL STRIP.AUTO: ABNORMAL
NITRITE UR QL STRIP.AUTO: NEGATIVE
PH UR STRIP: 7 (ref 5–8)
PROT UR STRIP-MCNC: NEGATIVE MG/DL
RBC #/AREA URNS HPF: ABNORMAL /HPF (ref 0–5)
SP GR UR REFRACTOMETRY: 1.01 (ref 1–1.03)
SPECIMEN EXP DATE BLD: NORMAL
UROBILINOGEN UR QL STRIP.AUTO: 0.2 EU/DL (ref 0.2–1)
WBC URNS QL MICRO: ABNORMAL /HPF (ref 0–5)

## 2024-03-27 PROCEDURE — 2709999900 HC NON-CHARGEABLE SUPPLY: Performed by: ORTHOPAEDIC SURGERY

## 2024-03-27 PROCEDURE — 97530 THERAPEUTIC ACTIVITIES: CPT

## 2024-03-27 PROCEDURE — 3600000002 HC SURGERY LEVEL 2 BASE: Performed by: ORTHOPAEDIC SURGERY

## 2024-03-27 PROCEDURE — 86850 RBC ANTIBODY SCREEN: CPT

## 2024-03-27 PROCEDURE — C1776 JOINT DEVICE (IMPLANTABLE): HCPCS | Performed by: ORTHOPAEDIC SURGERY

## 2024-03-27 PROCEDURE — 97116 GAIT TRAINING THERAPY: CPT

## 2024-03-27 PROCEDURE — 87186 SC STD MICRODIL/AGAR DIL: CPT

## 2024-03-27 PROCEDURE — 82962 GLUCOSE BLOOD TEST: CPT

## 2024-03-27 PROCEDURE — 2500000003 HC RX 250 WO HCPCS: Performed by: ORTHOPAEDIC SURGERY

## 2024-03-27 PROCEDURE — 3700000001 HC ADD 15 MINUTES (ANESTHESIA): Performed by: ORTHOPAEDIC SURGERY

## 2024-03-27 PROCEDURE — 6370000000 HC RX 637 (ALT 250 FOR IP): Performed by: ORTHOPAEDIC SURGERY

## 2024-03-27 PROCEDURE — 2580000003 HC RX 258: Performed by: ORTHOPAEDIC SURGERY

## 2024-03-27 PROCEDURE — 6360000002 HC RX W HCPCS: Performed by: ORTHOPAEDIC SURGERY

## 2024-03-27 PROCEDURE — 7100000000 HC PACU RECOVERY - FIRST 15 MIN: Performed by: ORTHOPAEDIC SURGERY

## 2024-03-27 PROCEDURE — 0SRB02A REPLACEMENT OF LEFT HIP JOINT WITH METAL ON POLYETHYLENE SYNTHETIC SUBSTITUTE, UNCEMENTED, OPEN APPROACH: ICD-10-PCS | Performed by: STUDENT IN AN ORGANIZED HEALTH CARE EDUCATION/TRAINING PROGRAM

## 2024-03-27 PROCEDURE — 97165 OT EVAL LOW COMPLEX 30 MIN: CPT

## 2024-03-27 PROCEDURE — 7100000001 HC PACU RECOVERY - ADDTL 15 MIN: Performed by: ORTHOPAEDIC SURGERY

## 2024-03-27 PROCEDURE — 97535 SELF CARE MNGMENT TRAINING: CPT

## 2024-03-27 PROCEDURE — 72170 X-RAY EXAM OF PELVIS: CPT

## 2024-03-27 PROCEDURE — 87088 URINE BACTERIA CULTURE: CPT

## 2024-03-27 PROCEDURE — 6370000000 HC RX 637 (ALT 250 FOR IP): Performed by: ANESTHESIOLOGY

## 2024-03-27 PROCEDURE — 2500000003 HC RX 250 WO HCPCS

## 2024-03-27 PROCEDURE — 86901 BLOOD TYPING SEROLOGIC RH(D): CPT

## 2024-03-27 PROCEDURE — 36415 COLL VENOUS BLD VENIPUNCTURE: CPT

## 2024-03-27 PROCEDURE — 1100000000 HC RM PRIVATE

## 2024-03-27 PROCEDURE — 6360000002 HC RX W HCPCS

## 2024-03-27 PROCEDURE — 97161 PT EVAL LOW COMPLEX 20 MIN: CPT

## 2024-03-27 PROCEDURE — 87086 URINE CULTURE/COLONY COUNT: CPT

## 2024-03-27 PROCEDURE — 3700000000 HC ANESTHESIA ATTENDED CARE: Performed by: ORTHOPAEDIC SURGERY

## 2024-03-27 PROCEDURE — 3600000012 HC SURGERY LEVEL 2 ADDTL 15MIN: Performed by: ORTHOPAEDIC SURGERY

## 2024-03-27 PROCEDURE — 86900 BLOOD TYPING SEROLOGIC ABO: CPT

## 2024-03-27 PROCEDURE — 81001 URINALYSIS AUTO W/SCOPE: CPT

## 2024-03-27 DEVICE — LFIT V40 FEMORAL HEAD
Type: IMPLANTABLE DEVICE | Site: HIP | Status: FUNCTIONAL
Brand: V40 HEAD

## 2024-03-27 DEVICE — 132 DEGREE NECK ANGLE HIP STEM
Type: IMPLANTABLE DEVICE | Site: HIP | Status: FUNCTIONAL
Brand: ACCOLADE

## 2024-03-27 DEVICE — X3 INSERT FOR MDM LINER
Type: IMPLANTABLE DEVICE | Site: HIP | Status: FUNCTIONAL
Brand: MDM X3 INSERT

## 2024-03-27 DEVICE — LINER- CEMENTLESS
Type: IMPLANTABLE DEVICE | Site: HIP | Status: FUNCTIONAL
Brand: MDM

## 2024-03-27 DEVICE — TRIDENT II TRITANIUM CLUSTER 50D
Type: IMPLANTABLE DEVICE | Site: HIP | Status: FUNCTIONAL
Brand: TRIDENT II

## 2024-03-27 RX ORDER — SODIUM CHLORIDE 9 MG/ML
INJECTION, SOLUTION INTRAVENOUS PRN
Status: DISCONTINUED | OUTPATIENT
Start: 2024-03-27 | End: 2024-03-27 | Stop reason: HOSPADM

## 2024-03-27 RX ORDER — DOCUSATE SODIUM 100 MG/1
100 CAPSULE, LIQUID FILLED ORAL 2 TIMES DAILY PRN
Qty: 28 CAPSULE | Refills: 0
Start: 2024-03-27 | End: 2024-04-10

## 2024-03-27 RX ORDER — FENTANYL CITRATE 50 UG/ML
INJECTION, SOLUTION INTRAMUSCULAR; INTRAVENOUS PRN
Status: DISCONTINUED | OUTPATIENT
Start: 2024-03-27 | End: 2024-03-27 | Stop reason: SDUPTHER

## 2024-03-27 RX ORDER — PROPOFOL 10 MG/ML
INJECTION, EMULSION INTRAVENOUS PRN
Status: DISCONTINUED | OUTPATIENT
Start: 2024-03-27 | End: 2024-03-27 | Stop reason: SDUPTHER

## 2024-03-27 RX ORDER — SODIUM CHLORIDE 0.9 % (FLUSH) 0.9 %
5-40 SYRINGE (ML) INJECTION EVERY 12 HOURS SCHEDULED
Status: DISCONTINUED | OUTPATIENT
Start: 2024-03-27 | End: 2024-03-27 | Stop reason: HOSPADM

## 2024-03-27 RX ORDER — SODIUM CHLORIDE, SODIUM LACTATE, POTASSIUM CHLORIDE, CALCIUM CHLORIDE 600; 310; 30; 20 MG/100ML; MG/100ML; MG/100ML; MG/100ML
INJECTION, SOLUTION INTRAVENOUS CONTINUOUS
Status: DISCONTINUED | OUTPATIENT
Start: 2024-03-27 | End: 2024-03-27 | Stop reason: HOSPADM

## 2024-03-27 RX ORDER — ONDANSETRON 4 MG/1
4 TABLET, ORALLY DISINTEGRATING ORAL EVERY 8 HOURS PRN
Status: DISCONTINUED | OUTPATIENT
Start: 2024-03-27 | End: 2024-03-27 | Stop reason: HOSPADM

## 2024-03-27 RX ORDER — DROPERIDOL 2.5 MG/ML
0.62 INJECTION, SOLUTION INTRAMUSCULAR; INTRAVENOUS
Status: DISCONTINUED | OUTPATIENT
Start: 2024-03-27 | End: 2024-03-27 | Stop reason: HOSPADM

## 2024-03-27 RX ORDER — VANCOMYCIN HYDROCHLORIDE 1 G/20ML
INJECTION, POWDER, LYOPHILIZED, FOR SOLUTION INTRAVENOUS PRN
Status: DISCONTINUED | OUTPATIENT
Start: 2024-03-27 | End: 2024-03-27 | Stop reason: ALTCHOICE

## 2024-03-27 RX ORDER — OXYCODONE HYDROCHLORIDE 5 MG/1
10 TABLET ORAL EVERY 4 HOURS PRN
Status: DISCONTINUED | OUTPATIENT
Start: 2024-03-27 | End: 2024-03-27 | Stop reason: HOSPADM

## 2024-03-27 RX ORDER — AMOXICILLIN 500 MG/1
500 CAPSULE ORAL
Status: ON HOLD | COMMUNITY
Start: 2024-03-05 | End: 2024-03-27 | Stop reason: HOSPADM

## 2024-03-27 RX ORDER — OXYCODONE HYDROCHLORIDE 5 MG/1
5 TABLET ORAL
Status: DISCONTINUED | OUTPATIENT
Start: 2024-03-27 | End: 2024-03-27 | Stop reason: HOSPADM

## 2024-03-27 RX ORDER — ASPIRIN 325 MG
325 TABLET, DELAYED RELEASE (ENTERIC COATED) ORAL DAILY
Qty: 30 TABLET | Refills: 0
Start: 2024-03-27 | End: 2024-04-26

## 2024-03-27 RX ORDER — SODIUM CHLORIDE 9 MG/ML
INJECTION, SOLUTION INTRAVENOUS CONTINUOUS
Status: DISCONTINUED | OUTPATIENT
Start: 2024-03-27 | End: 2024-03-27 | Stop reason: HOSPADM

## 2024-03-27 RX ORDER — LIDOCAINE HYDROCHLORIDE 20 MG/ML
INJECTION, SOLUTION EPIDURAL; INFILTRATION; INTRACAUDAL; PERINEURAL PRN
Status: DISCONTINUED | OUTPATIENT
Start: 2024-03-27 | End: 2024-03-27 | Stop reason: SDUPTHER

## 2024-03-27 RX ORDER — SODIUM CHLORIDE, SODIUM LACTATE, POTASSIUM CHLORIDE, AND CALCIUM CHLORIDE .6; .31; .03; .02 G/100ML; G/100ML; G/100ML; G/100ML
IRRIGANT IRRIGATION PRN
Status: DISCONTINUED | OUTPATIENT
Start: 2024-03-27 | End: 2024-03-27 | Stop reason: ALTCHOICE

## 2024-03-27 RX ORDER — DROPERIDOL 2.5 MG/ML
INJECTION, SOLUTION INTRAMUSCULAR; INTRAVENOUS PRN
Status: DISCONTINUED | OUTPATIENT
Start: 2024-03-27 | End: 2024-03-27 | Stop reason: SDUPTHER

## 2024-03-27 RX ORDER — MEPERIDINE HYDROCHLORIDE 50 MG/ML
12.5 INJECTION INTRAMUSCULAR; INTRAVENOUS; SUBCUTANEOUS ONCE
Status: DISCONTINUED | OUTPATIENT
Start: 2024-03-27 | End: 2024-03-27 | Stop reason: HOSPADM

## 2024-03-27 RX ORDER — PHENYLEPHRINE HCL IN 0.9% NACL 1 MG/10 ML
SYRINGE (ML) INTRAVENOUS PRN
Status: DISCONTINUED | OUTPATIENT
Start: 2024-03-27 | End: 2024-03-27 | Stop reason: SDUPTHER

## 2024-03-27 RX ORDER — ONDANSETRON 2 MG/ML
4 INJECTION INTRAMUSCULAR; INTRAVENOUS EVERY 6 HOURS PRN
Status: DISCONTINUED | OUTPATIENT
Start: 2024-03-27 | End: 2024-03-27 | Stop reason: HOSPADM

## 2024-03-27 RX ORDER — DIPHENHYDRAMINE HYDROCHLORIDE 50 MG/ML
25 INJECTION INTRAMUSCULAR; INTRAVENOUS EVERY 6 HOURS PRN
Status: DISCONTINUED | OUTPATIENT
Start: 2024-03-27 | End: 2024-03-27 | Stop reason: HOSPADM

## 2024-03-27 RX ORDER — ONDANSETRON 2 MG/ML
INJECTION INTRAMUSCULAR; INTRAVENOUS PRN
Status: DISCONTINUED | OUTPATIENT
Start: 2024-03-27 | End: 2024-03-27 | Stop reason: SDUPTHER

## 2024-03-27 RX ORDER — DIPHENHYDRAMINE HYDROCHLORIDE 50 MG/ML
12.5 INJECTION INTRAMUSCULAR; INTRAVENOUS
Status: DISCONTINUED | OUTPATIENT
Start: 2024-03-27 | End: 2024-03-27 | Stop reason: HOSPADM

## 2024-03-27 RX ORDER — TRANEXAMIC ACID 10 MG/ML
1000 INJECTION, SOLUTION INTRAVENOUS
Status: COMPLETED | OUTPATIENT
Start: 2024-03-27 | End: 2024-03-27

## 2024-03-27 RX ORDER — SENNOSIDES A AND B 8.6 MG/1
1 TABLET, FILM COATED ORAL DAILY PRN
Status: DISCONTINUED | OUTPATIENT
Start: 2024-03-27 | End: 2024-03-27 | Stop reason: HOSPADM

## 2024-03-27 RX ORDER — LABETALOL HYDROCHLORIDE 5 MG/ML
10 INJECTION, SOLUTION INTRAVENOUS
Status: DISCONTINUED | OUTPATIENT
Start: 2024-03-27 | End: 2024-03-27 | Stop reason: HOSPADM

## 2024-03-27 RX ORDER — IPRATROPIUM BROMIDE AND ALBUTEROL SULFATE 2.5; .5 MG/3ML; MG/3ML
1 SOLUTION RESPIRATORY (INHALATION)
Status: DISCONTINUED | OUTPATIENT
Start: 2024-03-27 | End: 2024-03-27 | Stop reason: HOSPADM

## 2024-03-27 RX ORDER — HYDROMORPHONE HYDROCHLORIDE 1 MG/ML
0.5 INJECTION, SOLUTION INTRAMUSCULAR; INTRAVENOUS; SUBCUTANEOUS
Status: DISCONTINUED | OUTPATIENT
Start: 2024-03-27 | End: 2024-03-27 | Stop reason: HOSPADM

## 2024-03-27 RX ORDER — SCOLOPAMINE TRANSDERMAL SYSTEM 1 MG/1
1 PATCH, EXTENDED RELEASE TRANSDERMAL
Status: DISCONTINUED | OUTPATIENT
Start: 2024-03-27 | End: 2024-03-27 | Stop reason: HOSPADM

## 2024-03-27 RX ORDER — NALOXONE HYDROCHLORIDE 0.4 MG/ML
INJECTION, SOLUTION INTRAMUSCULAR; INTRAVENOUS; SUBCUTANEOUS PRN
Status: DISCONTINUED | OUTPATIENT
Start: 2024-03-27 | End: 2024-03-27 | Stop reason: HOSPADM

## 2024-03-27 RX ORDER — HYDROMORPHONE HYDROCHLORIDE 1 MG/ML
0.5 INJECTION, SOLUTION INTRAMUSCULAR; INTRAVENOUS; SUBCUTANEOUS EVERY 5 MIN PRN
Status: DISCONTINUED | OUTPATIENT
Start: 2024-03-27 | End: 2024-03-27 | Stop reason: HOSPADM

## 2024-03-27 RX ORDER — ACETAMINOPHEN 500 MG
1000 TABLET ORAL ONCE
Status: DISCONTINUED | OUTPATIENT
Start: 2024-03-27 | End: 2024-03-27 | Stop reason: HOSPADM

## 2024-03-27 RX ORDER — SODIUM CHLORIDE 0.9 % (FLUSH) 0.9 %
5-40 SYRINGE (ML) INJECTION PRN
Status: DISCONTINUED | OUTPATIENT
Start: 2024-03-27 | End: 2024-03-27 | Stop reason: HOSPADM

## 2024-03-27 RX ORDER — DEXAMETHASONE SODIUM PHOSPHATE 4 MG/ML
INJECTION, SOLUTION INTRA-ARTICULAR; INTRALESIONAL; INTRAMUSCULAR; INTRAVENOUS; SOFT TISSUE PRN
Status: DISCONTINUED | OUTPATIENT
Start: 2024-03-27 | End: 2024-03-27 | Stop reason: SDUPTHER

## 2024-03-27 RX ORDER — DIPHENHYDRAMINE HCL 25 MG
25 CAPSULE ORAL EVERY 6 HOURS PRN
Status: DISCONTINUED | OUTPATIENT
Start: 2024-03-27 | End: 2024-03-27 | Stop reason: HOSPADM

## 2024-03-27 RX ORDER — ONDANSETRON 2 MG/ML
4 INJECTION INTRAMUSCULAR; INTRAVENOUS
Status: DISCONTINUED | OUTPATIENT
Start: 2024-03-27 | End: 2024-03-27 | Stop reason: HOSPADM

## 2024-03-27 RX ORDER — GLYCOPYRROLATE 0.2 MG/ML
INJECTION INTRAMUSCULAR; INTRAVENOUS PRN
Status: DISCONTINUED | OUTPATIENT
Start: 2024-03-27 | End: 2024-03-27 | Stop reason: SDUPTHER

## 2024-03-27 RX ORDER — ROCURONIUM BROMIDE 10 MG/ML
INJECTION, SOLUTION INTRAVENOUS PRN
Status: DISCONTINUED | OUTPATIENT
Start: 2024-03-27 | End: 2024-03-27 | Stop reason: SDUPTHER

## 2024-03-27 RX ORDER — ASPIRIN 325 MG
325 TABLET, DELAYED RELEASE (ENTERIC COATED) ORAL 2 TIMES DAILY
Status: DISCONTINUED | OUTPATIENT
Start: 2024-03-27 | End: 2024-03-27 | Stop reason: HOSPADM

## 2024-03-27 RX ORDER — MIDAZOLAM HYDROCHLORIDE 1 MG/ML
INJECTION INTRAMUSCULAR; INTRAVENOUS PRN
Status: DISCONTINUED | OUTPATIENT
Start: 2024-03-27 | End: 2024-03-27 | Stop reason: SDUPTHER

## 2024-03-27 RX ORDER — OXYCODONE HYDROCHLORIDE AND ACETAMINOPHEN 5; 325 MG/1; MG/1
1 TABLET ORAL EVERY 4 HOURS PRN
Qty: 50 TABLET | Refills: 0
Start: 2024-03-27 | End: 2024-04-10

## 2024-03-27 RX ORDER — KETAMINE HCL IN NACL, ISO-OSM 100MG/10ML
SYRINGE (ML) INJECTION PRN
Status: DISCONTINUED | OUTPATIENT
Start: 2024-03-27 | End: 2024-03-27 | Stop reason: SDUPTHER

## 2024-03-27 RX ORDER — OXYCODONE HYDROCHLORIDE 5 MG/1
5 TABLET ORAL EVERY 4 HOURS PRN
Status: DISCONTINUED | OUTPATIENT
Start: 2024-03-27 | End: 2024-03-27 | Stop reason: HOSPADM

## 2024-03-27 RX ORDER — FENTANYL CITRATE 50 UG/ML
25 INJECTION, SOLUTION INTRAMUSCULAR; INTRAVENOUS EVERY 5 MIN PRN
Status: DISCONTINUED | OUTPATIENT
Start: 2024-03-27 | End: 2024-03-27 | Stop reason: HOSPADM

## 2024-03-27 RX ORDER — ACETAMINOPHEN 325 MG/1
650 TABLET ORAL EVERY 6 HOURS
Status: DISCONTINUED | OUTPATIENT
Start: 2024-03-27 | End: 2024-03-27 | Stop reason: HOSPADM

## 2024-03-27 RX ADMIN — PROPOFOL 15 MCG/KG/MIN: 10 INJECTION, EMULSION INTRAVENOUS at 09:01

## 2024-03-27 RX ADMIN — OXYCODONE 10 MG: 5 TABLET ORAL at 12:09

## 2024-03-27 RX ADMIN — MIDAZOLAM 2 MG: 1 INJECTION INTRAMUSCULAR; INTRAVENOUS at 08:08

## 2024-03-27 RX ADMIN — ROCURONIUM BROMIDE 50 MG: 10 INJECTION, SOLUTION INTRAVENOUS at 08:14

## 2024-03-27 RX ADMIN — GLYCOPYRROLATE 0.1 MG: 0.2 INJECTION INTRAMUSCULAR; INTRAVENOUS at 09:43

## 2024-03-27 RX ADMIN — Medication 10 MG: at 09:47

## 2024-03-27 RX ADMIN — Medication 20 MG: at 08:57

## 2024-03-27 RX ADMIN — CEFTRIAXONE 1000 MG: 1 INJECTION, POWDER, FOR SOLUTION INTRAMUSCULAR; INTRAVENOUS at 10:33

## 2024-03-27 RX ADMIN — SODIUM CHLORIDE: 9 INJECTION, SOLUTION INTRAVENOUS at 12:10

## 2024-03-27 RX ADMIN — Medication 20 MG: at 09:43

## 2024-03-27 RX ADMIN — DIPHENHYDRAMINE HYDROCHLORIDE 25 MG: 25 CAPSULE ORAL at 18:04

## 2024-03-27 RX ADMIN — LIDOCAINE HYDROCHLORIDE 80 MG: 20 INJECTION, SOLUTION EPIDURAL; INFILTRATION; INTRACAUDAL; PERINEURAL at 08:13

## 2024-03-27 RX ADMIN — HYDROMORPHONE HYDROCHLORIDE 0.5 MG: 1 INJECTION, SOLUTION INTRAMUSCULAR; INTRAVENOUS; SUBCUTANEOUS at 10:30

## 2024-03-27 RX ADMIN — HYDROMORPHONE HYDROCHLORIDE 0.5 MG: 1 INJECTION, SOLUTION INTRAMUSCULAR; INTRAVENOUS; SUBCUTANEOUS at 10:40

## 2024-03-27 RX ADMIN — FENTANYL CITRATE 50 MCG: 50 INJECTION, SOLUTION INTRAMUSCULAR; INTRAVENOUS at 09:14

## 2024-03-27 RX ADMIN — PROPOFOL 160 MG: 10 INJECTION, EMULSION INTRAVENOUS at 08:13

## 2024-03-27 RX ADMIN — ONDANSETRON 4 MG: 2 INJECTION INTRAMUSCULAR; INTRAVENOUS at 15:40

## 2024-03-27 RX ADMIN — Medication 100 MCG: at 09:07

## 2024-03-27 RX ADMIN — ROCURONIUM BROMIDE 20 MG: 10 INJECTION, SOLUTION INTRAVENOUS at 09:15

## 2024-03-27 RX ADMIN — WATER 2000 MG: 1 INJECTION INTRAMUSCULAR; INTRAVENOUS; SUBCUTANEOUS at 15:40

## 2024-03-27 RX ADMIN — ACETAMINOPHEN 650 MG: 325 TABLET ORAL at 12:10

## 2024-03-27 RX ADMIN — SODIUM CHLORIDE, SODIUM LACTATE, POTASSIUM CHLORIDE, AND CALCIUM CHLORIDE: 600; 310; 30; 20 INJECTION, SOLUTION INTRAVENOUS at 07:11

## 2024-03-27 RX ADMIN — WATER 2000 MG: 1 INJECTION INTRAMUSCULAR; INTRAVENOUS; SUBCUTANEOUS at 08:25

## 2024-03-27 RX ADMIN — TRANEXAMIC ACID 1000 MG: 10 INJECTION, SOLUTION INTRAVENOUS at 08:25

## 2024-03-27 RX ADMIN — DROPERIDOL 0.62 MG: 2.5 INJECTION, SOLUTION INTRAMUSCULAR; INTRAVENOUS at 08:25

## 2024-03-27 RX ADMIN — ACETAMINOPHEN 650 MG: 325 TABLET ORAL at 18:04

## 2024-03-27 RX ADMIN — OXYCODONE 10 MG: 5 TABLET ORAL at 16:16

## 2024-03-27 RX ADMIN — TRANEXAMIC ACID 1000 MG: 10 INJECTION, SOLUTION INTRAVENOUS at 10:27

## 2024-03-27 RX ADMIN — ONDANSETRON HYDROCHLORIDE 4 MG: 2 INJECTION INTRAMUSCULAR; INTRAVENOUS at 10:33

## 2024-03-27 RX ADMIN — SUGAMMADEX 250 MG: 100 INJECTION, SOLUTION INTRAVENOUS at 10:35

## 2024-03-27 RX ADMIN — DEXAMETHASONE SODIUM PHOSPHATE 8 MG: 4 INJECTION, SOLUTION INTRAMUSCULAR; INTRAVENOUS at 08:25

## 2024-03-27 RX ADMIN — SODIUM CHLORIDE, SODIUM LACTATE, POTASSIUM CHLORIDE, AND CALCIUM CHLORIDE: 600; 310; 30; 20 INJECTION, SOLUTION INTRAVENOUS at 09:59

## 2024-03-27 RX ADMIN — FENTANYL CITRATE 50 MCG: 50 INJECTION, SOLUTION INTRAMUSCULAR; INTRAVENOUS at 08:10

## 2024-03-27 ASSESSMENT — PROMIS GLOBAL HEALTH SCALE
HOW IS THE PROMIS V1.1 BEING ADMINISTERED?: PAPER
IN GENERAL, WOULD YOU SAY YOUR HEALTH IS...[ON A SCALE OF 1 (POOR) TO 5 (EXCELLENT)]: GOOD
TO WHAT EXTENT ARE YOU ABLE TO CARRY OUT YOUR EVERYDAY PHYSICAL ACTIVITIES SUCH AS WALKING, CLIMBING STAIRS, CARRYING GROCERIES, OR MOVING A CHAIR [ON A SCALE OF 1 (NOT AT ALL) TO 5 (COMPLETELY)]?: A LITTLE
IN THE PAST 7 DAYS, HOW WOULD YOU RATE YOUR FATIGUE ON AVERAGE [ON A SCALE FROM 1 (NONE) TO 5 (VERY SEVERE)]?: MODERATE
SUM OF RESPONSES TO QUESTIONS 3, 6, 7, & 8: 14
IN THE PAST 7 DAYS, HOW OFTEN HAVE YOU BEEN BOTHERED BY EMOTIONAL PROBLEMS, SUCH AS FEELING ANXIOUS, DEPRESSED, OR IRRITABLE [ON A SCALE FROM 1 (NEVER) TO 5 (ALWAYS)]?: RARELY
IN GENERAL, WOULD YOU SAY YOUR QUALITY OF LIFE IS...[ON A SCALE OF 1 (POOR) TO 5 (EXCELLENT)]: VERY GOOD
IN GENERAL, HOW WOULD YOU RATE YOUR PHYSICAL HEALTH [ON A SCALE OF 1 (POOR) TO 5 (EXCELLENT)]?: GOOD
IN THE PAST 7 DAYS, HOW WOULD YOU RATE YOUR PAIN ON AVERAGE [ON A SCALE FROM 0 (NO PAIN) TO 10 (WORST IMAGINABLE PAIN)]?: 6
IN GENERAL, PLEASE RATE HOW WELL YOU CARRY OUT YOUR USUAL SOCIAL ACTIVITIES (INCLUDES ACTIVITIES AT HOME, AT WORK, AND IN YOUR COMMUNITY, AND RESPONSIBILITIES AS A PARENT, CHILD, SPOUSE, EMPLOYEE, FRIEND, ETC) [ON A SCALE OF 1 (POOR) TO 5 (EXCELLENT)]?: VERY GOOD
WHO IS THE PERSON COMPLETING THE PROMIS V1.1 SURVEY?: SELF
IN GENERAL, HOW WOULD YOU RATE YOUR SATISFACTION WITH YOUR SOCIAL ACTIVITIES AND RELATIONSHIPS [ON A SCALE OF 1 (POOR) TO 5 (EXCELLENT)]?: GOOD

## 2024-03-27 ASSESSMENT — PAIN DESCRIPTION - DESCRIPTORS
DESCRIPTORS: SHARP;ACHING
DESCRIPTORS: SHARP
DESCRIPTORS: SHARP

## 2024-03-27 ASSESSMENT — PAIN DESCRIPTION - ORIENTATION
ORIENTATION: LEFT
ORIENTATION: LEFT

## 2024-03-27 ASSESSMENT — HOOS JR
WALKING ON UNEVEN SURFACE: EXTREME
LYING IN BED (TURNING OVER, MAINTAINING HIP POSITION): SEVERE
HOOS JR RAW SCORE: 19
BENDING TO THE FLOOR TO PICK UP OBJECT: SEVERE
GOING UP OR DOWN STAIRS: EXTREME
SITTING: MODERATE
HOOS JR RAW SCORE: 19
RISING FROM SITTING: SEVERE
HOOS JR TOTAL INTERVAL SCORE: 29.009

## 2024-03-27 ASSESSMENT — PAIN - FUNCTIONAL ASSESSMENT
PAIN_FUNCTIONAL_ASSESSMENT: 0-10
PAIN_FUNCTIONAL_ASSESSMENT: PREVENTS OR INTERFERES SOME ACTIVE ACTIVITIES AND ADLS
PAIN_FUNCTIONAL_ASSESSMENT: PREVENTS OR INTERFERES SOME ACTIVE ACTIVITIES AND ADLS

## 2024-03-27 ASSESSMENT — PAIN SCALES - GENERAL
PAINLEVEL_OUTOF10: 8
PAINLEVEL_OUTOF10: 4
PAINLEVEL_OUTOF10: 7
PAINLEVEL_OUTOF10: 4

## 2024-03-27 ASSESSMENT — PAIN DESCRIPTION - LOCATION
LOCATION: HIP
LOCATION: HIP

## 2024-03-27 NOTE — PERIOP NOTE
Notified ALINE Harvey RN that pt has redness under abdomen in the belt area with redness in left hip area. She states she will notify Dr. Oliver prior to procedure so he can examine area.

## 2024-03-27 NOTE — OP NOTE
Accolade 2 stem.  Trial reduction of the 22.2 mm inner/ 38 mm outer  head with a +0 neck length was undertaken. The hip was stable in all planes and leg lengths were equal.  The patient's components were in good position by portable xray.       With this stability, the trial components were removed and the size 3 implantable stem was impacted into position.  A 38D Restoration MDM X3 insert over a 22.2 mm metal inner head was impacted onto the trunion.  The hip was reduced and taken through a range of motion and found to be stable. The wound was copiously irrigated, closed in layers. The posterior capsule was repaired with #5 Ethibond through drill holes in the proximal femur.  The fascia aman was closed with #1 Stratofix and the dermal layer with 2-0 vicryl buried interrupted sutures.  The skin was reapposed with staples. A compression dressing was applied.    Instrument, sponge, and needle counts were correct prior to wound closure and at the conclusion of the case.     Findings: severe left hip degenerative joint disease    Estimated Blood Loss:  400 cc    Specimens: None    Implant:   Implant Name Type Inv. Item Serial No.  Lot No. LRB No. Used Action   SHELL ACET SZ D [id number] CLUS H TRITANIUM PRESSFIT GARRY - [id number]  SHELL ACET SZ D [id number] 3 CLUS H TRITANIUM PRESSFIT GARRY  [employer] [id number] Left 1 Implanted   LINER ACET SZ D ID38MM HIP CO CHROM MOD 2 MOBILITY MDM - [id number]  LINER ACET SZ D ID38MM HIP CO CHROM MOD 2 MOBILITY [facility] [id number] Left 1 Implanted   STEM FEM SZ 3 L102MM NK L30MM 35MM OFFSET 132DEG HIP TI - [id number]  STEM FEM SZ 3 L102MM NK L30MM 35MM OFFSET 132DEG HIP TI  [employer] [id number] Left 1 Implanted   INSERT ACET 22.2X38 MM HIP X3 MDM - [id number]  INSERT ACET 22.2X38 MM HIP X3 MDM  [employer] [id number] Left 1 Implanted   HEAD FEM [id number] +0MM OFFSET HIP CO CHROM V40 TAPR LO [facility] - [id number]

## 2024-03-27 NOTE — PERIOP NOTE
TRANSFER - IN REPORT:    Verbal report received from OR RN on Taniya Valdez  being received from OR for routine progression of patient care      Report consisted of patient's Situation, Background, Assessment and   Recommendations(SBAR).     Information from the following report(s) Adult Overview, Surgery Report, Intake/Output, and MAR was reviewed with the receiving nurse.    Opportunity for questions and clarification was provided.      Assessment completed upon patient's arrival to unit and care assumed.

## 2024-03-27 NOTE — INTERVAL H&P NOTE
Update History & Physical    The patient's History and Physical of March 19, 2024 was reviewed with the patient and I examined the patient. There was no change. The surgical site was confirmed by the patient and me.     Plan: The risks, benefits, expected outcome, and alternative to the recommended procedure have been discussed with the patient. Patient understands and wants to proceed with the procedure.     Electronically signed by Garrett Oliver MD on 3/27/2024 at 7:59 AM

## 2024-03-27 NOTE — ANESTHESIA POSTPROCEDURE EVALUATION
Post-Anesthesia Evaluation and Assessment    Cardiovascular Function/Vital Signs  Visit Vitals  BP (!) 144/57   Pulse 91   Temp 97.9 °F (36.6 °C) (Temporal)   Resp 15   Ht 1.626 m (5' 4.02\")   Wt 108.1 kg (238 lb 6.4 oz)   SpO2 95%   BMI 40.90 kg/m²       Patient is status post Procedure(s):  LEFT TOTAL HIP ARTHROPLASTY.    Nausea/Vomiting: Controlled.    Postoperative hydration reviewed and adequate.    Pain:      Managed.    Neurological Status:       At baseline.    Mental Status and Level of Consciousness: Progressing to baseline appropriately     Pulmonary Status:       Adequate oxygenation and airway patent.    Complications related to anesthesia: None    Post-anesthesia assessment completed. No concerns.        Signed By: CHRISTIAN PAYNE MD    March 27, 2024

## 2024-03-27 NOTE — DISCHARGE INSTRUCTIONS
Total Hip Replacement Instructions:    ACTIVITIES :  Follow your Hip Precautions:   Do NOT bend over beyond 90 degrees at your waist.  Do NOT cross your legs   Do NOT turn your toes inward  Do NOT cross the imaginary line in the middle of your body when getting in or out of bed or a car.   Do not sit for more than 1 hour at a time while you are awake. Walking is the best way to recover after hipreplacement.    Do the exercises as directed by your Physical Therapist   Use your walker until your Physical Therapist tells you it is safe to stop.   Sit in chairs with arms.   Rest when you feel tired. You may take a nap, but don't stay in bed all day.   Avoid jogging, running, kneeling, vacuuming, carrying heavy bags, or bending over.  Ask Dr. Oliver when it is safe to have sex.      BATHING and INCISION CARE:  Apply Optifoam Dressing on second day after surgery.   Keep the dressing clean and dry.  The incision is closed with staples that can't get wet.   If the tape starts to come off, then reinforce it with the tape from the hospital.   No showering until after your first post op checkup.   Bathe off daily using dial soap.   No lotion, creams, powders, or ointments near the hip dressing.   Call Dr. Oliver if the dressings get wet, soiled, has drainage, or comes loose.        MEDICATIONS -  Daily Medications:  Resume the medications you were taking before surgery (unless instructed by your doctor not to).   You may want to wait on taking herbal supplements, vitamins,  and over the counter medication the first few days after surgery.   Blood Thinner Medication:   Take your blood thinner medication as prescribed.    DO NOT skip a dose.   Remember you will bruise and bleed easier while taking this medication to prevent blood clots.   Narcotic Pain Medication:  Take the narcotic pain medication as prescribed.   Do not take more than prescribed.    Eat before taking the narcotic pain medication (even in the middle of the

## 2024-03-27 NOTE — PERIOP NOTE
Reviewed PTA medication list with patient/caregiver and patient/caregiver denies any additional medications.     Patient admits to having a responsible adult care for them at home for at least 24 hours after surgery.    Patient encouraged to use gown warming system and informed that using said warming gown to regulate body temperature prior to a procedure has been shown to help reduce the risks of blood clots and infection.    Patient's pharmacy of choice verified and documented in PTA medication section.    Dual skin assessment & fall risk band verification completed with Angela MORSE RN.      No, not prescribed...

## 2024-03-27 NOTE — DISCHARGE SUMMARY
Admit date: 3/27/2024   Admitting Provider: Garrett Oliver MD     Discharge date: 3/27/2024  Discharging Provider: Garrett Oliver MD      * Admission Diagnoses: Osteoarthritis of left hip, unspecified osteoarthritis type [M16.12]  Unilateral primary osteoarthritis, left hip [M16.12]    * Discharge Diagnoses:      * Hospital Course: Patient was admitted s/p left ANGELO.  Patient was able to participate in PT on DOS.  On DOS patient's pain remained well controlled and patient was able to ambulate with assistance.  Patient was able to tolerate a regular diet and was able to void on their own.     On exam, patient was alert and oriented x 3.  They appeared comfortable.  Breathing was non labored.  The dressings were clean and dry and there were no focal neurovascular deficits.      Labs and post op imaging was reviewed.       Discharge instructions were provided.    * Procedures:   Procedure(s):  LEFT TOTAL HIP ARTHROPLASTY      Consults: PT/OT    Significant Diagnostic Studies:   Recent Results (from the past 24 hour(s))   TYPE AND SCREEN    Collection Time: 03/27/24  6:55 AM   Result Value Ref Range    Crossmatch expiration date 03/30/2024,2359     ABO/Rh O POSITIVE     Antibody Screen NEG    POCT Glucose    Collection Time: 03/27/24  7:09 AM   Result Value Ref Range    POC Glucose 107 70 - 110 mg/dL   Urinalysis    Collection Time: 03/27/24  8:20 AM   Result Value Ref Range    Color, UA YELLOW      Appearance CLEAR      Specific Gravity, UA 1.007 1.005 - 1.030      pH, Urine 7.0 5.0 - 8.0      Protein, UA Negative NEG mg/dL    Glucose, UA Negative NEG mg/dL    Ketones, Urine Negative NEG mg/dL    Bilirubin Urine Negative NEG      Blood, Urine Negative NEG      Urobilinogen, Urine 0.2 0.2 - 1.0 EU/dL    Nitrite, Urine Negative NEG      Leukocyte Esterase, Urine TRACE (A) NEG     Urinalysis, Micro    Collection Time: 03/27/24  8:20 AM   Result Value Ref Range    WBC, UA 0 to 3 0 - 5 /hpf    RBC, UA 0 to 3 0 - 5

## 2024-03-27 NOTE — PROGRESS NOTES
1148  Patient AAOx4. PIV is C/D/I with fluids infusing, no s/s of infiltration or phlebitis. VSS on RA. Breath sounds clear bilaterally. Patient reaching 2250 on IS. ABD pad and tape dressing to left hip is C/D/I. Sensation intact to BLE, pedal pulses present and palpable to BLE. SCDs intact to BLE. No other concerns at this time. Possessions and call bell within reach. Will continue to monitor.       1209  Patient states pain 7/10, PRN pain meds given per mar    1540  Patient c/o nausea and had one episode vomiting, PRN zofran given per mar    1616  Patient states pain 8/10, PRN pain meds given per mar.  Patient voided 100mL urine per PT/OT.    1756  Patient ambulated to bathroom and voided 250mL urine.   
Discharge education reviewed with pt and mother. All questions answered. IV removed, no complications.  Fabiola Felipe RN    
Occupational Therapy Evaluation/Treatment Attempt    Chart reviewed. Attempted Occupational Therapy Evaluation/Treatment, however, patient unable to be seen due to:  []  Nausea/vomiting  [x]  Eating-  Pt educated on therapy evaluation and home environment questions asked. Pt verbalized 100% understanding regarding her posterior hip precautions. Pt's food was warmed up for her and pt was left semi-reclined in bed with call bell in reach and tray table set up in front of her.   []  Pain  []  Patient too lethargic  []  Off Unit for testing/procedure  []  Dialysis treatment in progress   []  Telemetry Results  []  Other:     Will follow up later as patient's schedule allows.   Thank you for this referral.  Jaimee Leonardo, OTR/L  
PT and OT consult pending. Per patient's floor nurse, pending PT/OT clearance, patient will potentially discharge today. SW met patient at beside regarding the discharge planning. Patient's wife accompanied him in the room. Patient confirmed he had not ambulated yet. Patient reports that she has the following DMEs: 3-in1 bedside commode, shower chair, walker, and cold therapy ice machine; has outpatient therapy prearranged; and upon discharge his wife will transport and support him during recovery at home.      Ayo Warren MSW  Supervisee in Social Work  Care Management Department    
Physical Therapy Evaluation Attempt    Chart reviewed.  Pt unable to participate in physical therapy session due to:    [x]  Eating meal  []  Nausea  []  Dizziness  []  Lethargy  []  Lab Results  []  Blood Transfusion  []  Dialysis  []  Pain  [x]  Other:  Able to obtain social and home environment information.  Pt rating pain in L hip 4/10 using numerical pain scale.  Pt requesting to eat at this time but did voice desire to go home today if possible.  Reheated soup for pt as requested.    Will attempt later today as pt schedule allows.    Katelyn Shabazz PT   
SW called patient by room phone regarding the discharge planning. Patient confirmed she had ambulated. Patient reports that she has the following DMEs: raise toilet seat, walker, cane, and cold therapy ice machine; has outpatient therapy prearranged; and upon discharge her mom will transport and support her during recovery at home.      THUAN Kaiser  Supervisee in Social Work  Care Management Department   
consideration.     SUBJECTIVE:   Patient stated “I want to go home.”    OBJECTIVE DATA SUMMARY:     Past Medical History:   Diagnosis Date    Chronic pain     left hip    Diabetes (HCC)     pre- followed by PCP    Nausea & vomiting     after anesthesia    Osteoarthritis     legt hip    PCOS (polycystic ovarian syndrome)     PONV (postoperative nausea and vomiting)     Psychiatric disorder     depression    Sleep apnea 2019    cpap    Wears glasses      Past Surgical History:   Procedure Laterality Date    HYSTERECTOMY (CERVIX STATUS UNKNOWN)  2008    ORTHOPEDIC SURGERY  2003    right knee torm menicus    TOTAL HIP ARTHROPLASTY Right 2022       Home Situation:  Social/Functional History  Lives With: Alone, Parent  Type of Home: House  Home Layout: One level  Home Access: Stairs to enter with rails  Entrance Stairs - Number of Steps: 4  Entrance Stairs - Rails: Both (wide)  Bathroom Shower/Tub: Tub/Shower unit  Bathroom Equipment: Toilet raiser, Shower chair  Home Equipment: Cane, Walker, rolling, Grab bars  Receives Help From: Family  ADL Assistance: Independent  Critical Behavior:  Orientation  Overall Orientation Status: Within Functional Limits  Orientation Level: Oriented to place;Oriented to situation;Oriented to person  Cognition  Overall Cognitive Status: WFL  Cognition Comment: drowsy  Strength:    Strength: Generally decreased, functional  Tone & Sensation:   Tone: Normal  Sensation: Impaired (L hip)  Range Of Motion:  AROM: Generally decreased, functional  PROM: Generally decreased, functional  Functional Mobility:  Bed Mobility:  Bed Mobility Training  Bed Mobility Training: Yes  Interventions: Safety awareness training;Verbal cues  Supine to Sit: Contact-guard assistance;Additional time;Stand-by assistance  Sit to Supine: Contact-guard assistance;Additional time  Scooting: Contact-guard assistance;Stand-by assistance;Additional time  Transfers:  Transfer Training  Transfer Training: Yes  Interventions: 
functional  Strength: Generally decreased, functional    Tone & Sensation: BUE  Tone: Normal  Sensation: Impaired (L hip)          Range of Motion: BUE  AROM: Generally decreased, functional  PROM: Generally decreased, functional  AROM: Generally decreased, functional  PROM: Generally decreased, functional      Functional Mobility and Transfers for ADLs:  Bed Mobility:     Bed Mobility Training  Bed Mobility Training: Yes  Interventions: Safety awareness training;Tactile cues;Verbal cues  Supine to Sit: Minimum assistance  Sit to Supine: Minimum assistance  Scooting: Contact-guard assistance  Transfers:                 Transfer Training  Transfer Training: Yes  Interventions: Safety awareness training;Tactile cues;Verbal cues  Sit to Stand: Contact-guard assistance  Stand to Sit: Contact-guard assistance  Toilet Transfer: Contact-guard assistance    ADL Assessment:                  UE Dressing: Supervision  LE Dressing: Minimal assistance  Toileting: Contact guard assistance    ADL Intervention:    Therapeutic Exercise/Neuromuscular Re-education:    Pain:  Pain level pre-treatment: 4/10   Pain level post-treatment: NR/10   Pain Intervention(s): Rest, Ice, Repositioning   Response to intervention: Nurse notified regarding pt's performance with therapy.     Activity Tolerance:   Activity Tolerance: Patient Tolerated treatment well  Please refer to the flowsheet for vital signs taken during this treatment.    After treatment:   [x] Patient left in no apparent distress sitting up in chair  [] Patient left in no apparent distress in bed  [x] Call bell left within reach  [x] Nursing notified  [] Caregiver present  [] Bed alarm activated    COMMUNICATION/EDUCATION:   Patient Education  Education Given To: Patient;Family  Education Provided: Fall Prevention Strategies;Precautions;Plan of Care;ADL Adaptive Strategies;Transfer Training;Equipment;Energy Conservation;Family Education;Role of Therapy  Education Method: 
done

## 2024-03-27 NOTE — PERIOP NOTE
TRANSFER - OUT REPORT:    Verbal report given to Ileana FERRER on Taniya Valdez  being transferred to [address] [place] [address] for routine progression of patient care       Report consisted of patient's Situation, Background, Assessment and   Recommendations(SBAR).     Information from the following report(s) Adult Overview, Surgery Report, Intake/Output, and MAR was reviewed with the receiving nurse.           Lines:   Peripheral IV 03/27/24 Posterior;Right Hand (Active)   Site Assessment Clean, dry & intact 03/27/24 1116   Line Status Infusing 03/27/24 1116   Line Care Connections checked and tightened 03/27/24 0710   Phlebitis Assessment No symptoms 03/27/24 1116   Infiltration Assessment 0 03/27/24 1116   Dressing Status Clean, dry & intact 03/27/24 1116   Dressing Type Transparent 03/27/24 1116        Opportunity for questions and clarification was provided.      Patient transported with:  Registered Nurse

## 2024-03-27 NOTE — ANESTHESIA PRE PROCEDURE
Department of Anesthesiology  Preprocedure Note       Name:  Taniya Valdez   Age:  53 y.o.  :  1970                                          MRN:  [id number]         Date:  3/27/2024      Surgeon: Surgeon(s):  Garrett Oliver MD    Procedure: Procedure(s):  LEFT TOTAL HIP ARTHROPLASTY W/C-ARM    Medications prior to admission:   Prior to Admission medications    Medication Sig Start Date End Date Taking? Authorizing Provider   amoxicillin (AMOXIL) 500 MG capsule Take 1 capsule by mouth 3/5/24 3/5/25 Yes Provider, MD Katia   DICLOFENAC PO Take 75 mg by mouth in the morning and at bedtime Indications: hip pain   Yes Provider, MD Katia   buPROPion (WELLBUTRIN XL) 300 MG extended release tablet Take 1 tablet by mouth daily Indications: depression 22   Automatic Reconciliation, Ar   vitamin D3 (CHOLECALCIFEROL) 125 MCG (5000 UT) TABS tablet Take 1 tablet by mouth daily    Automatic Reconciliation, Ar   rosuvastatin (CRESTOR) 20 MG tablet Take 0.5 tablets by mouth nightly Indications: high cholesterol    Automatic Reconciliation, Ar       Current medications:    Current Facility-Administered Medications   Medication Dose Route Frequency Provider Last Rate Last Admin    lactated ringers IV soln infusion   IntraVENous Continuous Garrett Oliver  mL/hr at 24 0723 NoRateChange at 24 0723    ceFAZolin (ANCEF) 2,000 mg in sterile water 20 mL IV syringe  2,000 mg IntraVENous On Call to OR Garrett Oliver MD        tranexamic acid-NaCl IVPB premix 1,000 mg  1,000 mg IntraVENous On Call to OR Garrett Oliver MD        scopolamine (TRANSDERM-SCOP) transdermal patch 1 patch  1 patch TransDERmal Q72H Jag Marcelino MD   1 patch at 24 0710       Allergies:    Allergies   Allergen Reactions    Macadamia Nut Oil Itching    Statins Myalgia    Metformin Nausea And Vomiting    Sulfa Antibiotics Rash       Problem List:    Patient Active Problem List   Diagnosis Code

## 2024-03-27 NOTE — PLAN OF CARE
Problem: Chronic Conditions and Co-morbidities  Goal: Patient's chronic conditions and co-morbidity symptoms are monitored and maintained or improved  Outcome: Adequate for Discharge     Problem: Pain  Goal: Verbalizes/displays adequate comfort level or baseline comfort level  Outcome: Adequate for Discharge     Problem: Safety - Adult  Goal: Free from fall injury  Outcome: Adequate for Discharge     Problem: Discharge Planning  Goal: Discharge to home or other facility with appropriate resources  Outcome: Adequate for Discharge     Problem: ABCDS Injury Assessment  Goal: Absence of physical injury  Outcome: Adequate for Discharge

## 2024-03-29 ENCOUNTER — HOSPITAL ENCOUNTER (OUTPATIENT)
Facility: HOSPITAL | Age: 54
Setting detail: RECURRING SERIES
Discharge: HOME | End: 2024-04-01
Payer: OTHER GOVERNMENT

## 2024-03-29 LAB
BACTERIA SPEC CULT: ABNORMAL
CC UR VC: ABNORMAL
SERVICE CMNT-IMP: ABNORMAL

## 2024-03-29 PROCEDURE — 97161 PT EVAL LOW COMPLEX 20 MIN: CPT

## 2024-03-29 PROCEDURE — 97535 SELF CARE MNGMENT TRAINING: CPT

## 2024-03-29 NOTE — PROGRESS NOTES
[facility] at [facility] [place] [address]  Ph [phone]  Fx [phone]    Plan of Care/ Statement of Necessity for Physical Therapy Services      Patient name: Taniya Valdez Start of Care: 3/29/2024   Referral source: Garrett Oliver MD : 1970    Medical Diagnosis: Pain in left hip [M25.552]   Onset Date:3/27/24    Treatment Diagnosis: M25.552  LEFT HIP PAIN      Prior Hospitalization: see medical history Provider#: [id number]   Medications: Verified on Patient summary List   Comorbidities: s/p left THR with posterior approach 3/27/24, s/p right THR with posterior approach 2022, OA, morbid obesity, hysterectomy, 2 vaginal deliveries  Substance Use: [] tobacco use, [] alcohol use, [] other:   Patients Self-Rated Overall Health Status: [] poor, [] fair, [x] good, [] excellent  Number of Falls Within the Past Year: [x] none, []   Prior Level of Function:   [x] Unrestricted with functional activities and ADL's  [x] No assistive device  [] active lifestyle, [x] moderately active lifestyle, [] sedentary lifestyle  Patients Goals:  \"I hope to be functional with everything and painfree.\"      The Plan of Care and following information is based on the information from the initial evaluation.  Assessment/ key information: Patient is a pleasant 53 y.o. female presenting to skilled PT s/p left THR with posterior approach on 3/27/24 by Dr. Oliver.  Patient reports being discharged from the hospital to home the day of surgery.  Patient states pain has been significant since surgery, despite narcotic pain medications and icing an ice machine.  Patient is with difficulty and pain during all forms of ADL's and functional activities, to include but not limited to walking, standing, sitting, sit <> stands, stair negotiations, curb negotiations, getting in/out bed and car, and rolling over in bed.  She is currently using a FWW to ambulate and is not currently driving.  
To:   []  Other:    Thank you for the referral to [facility].  Sissy Lobato, PT     3/29/2024     10:59 AM

## 2024-04-01 ENCOUNTER — HOSPITAL ENCOUNTER (OUTPATIENT)
Facility: HOSPITAL | Age: 54
Setting detail: RECURRING SERIES
Discharge: HOME | End: 2024-04-04
Payer: OTHER GOVERNMENT

## 2024-04-01 PROCEDURE — 97530 THERAPEUTIC ACTIVITIES: CPT

## 2024-04-01 PROCEDURE — 97112 NEUROMUSCULAR REEDUCATION: CPT

## 2024-04-01 PROCEDURE — 97110 THERAPEUTIC EXERCISES: CPT

## 2024-04-01 NOTE — PROGRESS NOTES
PHYSICAL / OCCUPATIONAL THERAPY - DAILY TREATMENT NOTE     Patient Name: Taniya Valdez    Date: 2024    : 1970  Insurance: Payor:  EAST / Plan:  EAST / Product Type: *No Product type* /      Patient  verified Yes     Visit #   Current / Total 2 24   Time   In / Out 110 146   Pain   In / Out 8/10 8/10   Subjective Functional Status/Changes: Pt reported that she has been nauseous since surgery   Changes to:  Allergies, Med Hx, Sx Hx?   no       TREATMENT AREA =  Pain in left hip [M25.552]    OBJECTIVE    Therapeutic Procedures:  Tx Min Billable or 1:1 Min (if diff from Tx Min) Procedure, Rationale, Specifics   10 10 52930 Therapeutic Exercise (timed):  increase ROM, strength, coordination, balance, and proprioception to improve patient's ability to progress to PLOF and address remaining functional goals. (see flow sheet as applicable)    Details if applicable:       10 10 43965 Neuromuscular Re-Education (timed):  improve balance, coordination, kinesthetic sense, posture, core stability and proprioception to improve patient's ability to develop conscious control of individual muscles and awareness of position of extremities in order to progress to PLOF and address remaining functional goals. (see flow sheet as applicable)    Details if applicable:      22319 Therapeutic Activity (timed):  use of dynamic activities replicating functional movements to increase ROM, strength, coordination, balance, and proprioception in order to improve patient's ability to progress to PLOF and address remaining functional goals.  (see flow sheet as applicable)     Details if applicable:     36 36 [facility] Totals Reminder: bill using total billable min of TIMED therapeutic procedures (example: do not include dry needle or estim unattended, both untimed codes, in totals to left)  8-22 min = 1 unit; 23-37 min = 2 units; 38-52 min = 3 units; 53-67 min = 4 units; 68-82 min = 5 units   Total Total     TOTAL

## 2024-04-03 ENCOUNTER — HOSPITAL ENCOUNTER (OUTPATIENT)
Facility: HOSPITAL | Age: 54
Setting detail: RECURRING SERIES
Discharge: HOME | End: 2024-04-06
Payer: OTHER GOVERNMENT

## 2024-04-03 PROCEDURE — 97535 SELF CARE MNGMENT TRAINING: CPT

## 2024-04-03 PROCEDURE — 97110 THERAPEUTIC EXERCISES: CPT

## 2024-04-03 PROCEDURE — 97530 THERAPEUTIC ACTIVITIES: CPT

## 2024-04-03 NOTE — PROGRESS NOTES
PHYSICAL / OCCUPATIONAL THERAPY - DAILY TREATMENT NOTE     Patient Name: Taniya Valdez    Date: 4/3/2024    : 1970  Insurance: Payor:  EAST / Plan:  EAST / Product Type: *No Product type* /      Patient  verified Yes     Visit #   Current / Total 3 24   Time   In / Out 12:30 1:25   Pain   In / Out 8/10 9/10   Subjective Functional Status/Changes: \"I have a lot of pain in my Left Hip.\"   Changes to:  Allergies, Med Hx, Sx Hx?   no       TREATMENT AREA =  Pain in left hip [M25.552]    OBJECTIVE    Therapeutic Procedures:  Tx Min Billable or 1:1 Min (if diff from Tx Min) Procedure, Rationale, Specifics   20  32564 Therapeutic Exercise (timed):  increase ROM, strength, coordination, balance, and proprioception to improve patient's ability to progress to PLOF and address remaining functional goals. (see flow sheet as applicable)    Details if applicable:       10  03733 Neuromuscular Re-Education (timed):  improve balance, coordination, kinesthetic sense, posture, core stability and proprioception to improve patient's ability to develop conscious control of individual muscles and awareness of position of extremities in order to progress to PLOF and address remaining functional goals. (see flow sheet as applicable)    Details if applicable:     15  22929 Therapeutic Activity (timed):  use of dynamic activities replicating functional movements to increase ROM, strength, coordination, balance, and proprioception in order to improve patient's ability to progress to PLOF and address remaining functional goals.  (see flow sheet as applicable)     Details if applicable:     10  72689 Self Care/Home Management (timed):  improve patient knowledge and understanding of pain reducing techniques, positioning, and posture/ergonomics  to improve patient's ability to progress to PLOF and address remaining functional goals.  (see flow sheet as applicable)    Details if applicable:            Details if

## 2024-04-08 ENCOUNTER — HOSPITAL ENCOUNTER (OUTPATIENT)
Facility: HOSPITAL | Age: 54
Setting detail: RECURRING SERIES
Discharge: HOME | End: 2024-04-11
Payer: OTHER GOVERNMENT

## 2024-04-08 PROCEDURE — 97530 THERAPEUTIC ACTIVITIES: CPT

## 2024-04-08 PROCEDURE — 97112 NEUROMUSCULAR REEDUCATION: CPT

## 2024-04-08 PROCEDURE — 97110 THERAPEUTIC EXERCISES: CPT

## 2024-04-08 NOTE — PROGRESS NOTES
distances without AD and functional gait mechanics in order to improve overall mobility and return patient to his or her PLOF.  Eval: with FWW: forward flexed trunk, decreased left LE weight bearing during stance phase contributing to shorter right LE stride length, decreased left hip extension, WBOS, and slow gait speed, household distances     Patient will be able to safely negotiate a full flight of stairs with a step-through pattern while holding onto at least one handrail in order to return to normal with this functional activity and improve safety on stairs.  Eval: step-to pattern with difficulty     Patient will be able to perform at least 12 reps of sit <> stands with good form/control during 30\" STS test to demonstrate improved LE strength and stability during this functional activity, thus reducing the patients risk of falls.  Eval: 30\" STS: 3 reps, with use of hands to push from chair (18.5\" tall) and noted difficulty    PLAN  Yes  Continue plan of care  []  Upgrade activities as tolerated  []  Discharge due to :  []  Other:    Sissy Lobato, PT    4/8/2024    12:54 PM    Future Appointments   Date Time Provider Department Center   4/10/2024 12:30 PM Maren Pillo MORSE, PTA [facility]   4/16/2024  8:30 AM Maren Pillo MORSE, PTA [facility]   4/19/2024  8:30 AM Maren Pillo MORSE, PTA [facility]   4/22/2024  7:50 AM Sissy Lobato, PT [facility]   4/24/2024  7:10 AM Alannah Singleton, PT [facility]   4/29/2024  4:30 PM Maren, Pillo MORSE, PTA [facility]   5/1/2024  4:30 PM Maren, Pillo H, PTA [facility]   5/6/2024  7:50 AM Sissy Lobato, PT [facility]   5/8/2024  3:10 PM Maren Pillo MORSE, PTA [facility]   5/13/2024  7:50 AM Ileana Harris, PT [facility]   5/15/2024  7:50 AM Ileana Harris, PT [facility]   5/20/2024  7:50 AM Ileana Harris, PT [facility]

## 2024-04-10 ENCOUNTER — HOSPITAL ENCOUNTER (OUTPATIENT)
Facility: HOSPITAL | Age: 54
Setting detail: RECURRING SERIES
Discharge: HOME | End: 2024-04-13
Payer: OTHER GOVERNMENT

## 2024-04-10 PROCEDURE — 97112 NEUROMUSCULAR REEDUCATION: CPT

## 2024-04-10 PROCEDURE — 97530 THERAPEUTIC ACTIVITIES: CPT

## 2024-04-10 PROCEDURE — 97110 THERAPEUTIC EXERCISES: CPT

## 2024-04-10 NOTE — PROGRESS NOTES
Current: Patient reports daily compliance with her HEP.   4/8/24, MET     Patient will be able to correctly recite their 3 total hip precautions s/p THR with posterior approach to assist in reducing their risk of hip dislocation or injury.              Eval: total hip precautions reviewed with patient              Current: Patient able to correctly recite 3 total hip precautions.   4/8/24, MET     Patient will report at least a 25% reduction in pain/symptoms when performing ADLs/functional activities in order to improve overall tolerance to functional movements and progress towards PLOF.  Eval: 8/10 pain at worst, but an average daily pain of 8/10  Current: 9/10 pain at worst in the last week   4/8/24     Patient will be able to safely at least 500' with SPC and functional gait mechanics to improve patient's ambulation when attending doctor's appointments.  Eval: with FWW: forward flexed trunk, decreased left LE weight bearing during stance phase contributing to shorter right LE stride length, decreased left hip extension, WBOS, and slow gait speed, household distances     Patient will be able to perform at least 8 reps of sit <> stands with good form/control during 30\" STS test to demonstrate improved LE strength and stability during this functional activity, thus reducing the patients risk of falls.  Eval: 30\" STS: 3 reps, with use of hands to push from chair (18.5\" tall) and noted difficulty  Current: improving ability and control with sit <> stands from 20\" tall table   4/10/24, IN PROGRESS        Long Term Goals:     to be accomplished within 24  treatments:     Patient will score at least 44 points on FOTO in order to maximize function and promote patient satisfaction with overall outcome.  (FOTO is an established functional score where 100 = no disability)  Eval: 21     Patient will report an average daily pain of 2/10 or less during all functional activities in order to improve QOL and return to patient's

## 2024-04-16 ENCOUNTER — HOSPITAL ENCOUNTER (OUTPATIENT)
Facility: HOSPITAL | Age: 54
Setting detail: RECURRING SERIES
Discharge: HOME | End: 2024-04-19
Payer: OTHER GOVERNMENT

## 2024-04-16 PROCEDURE — 97530 THERAPEUTIC ACTIVITIES: CPT

## 2024-04-16 PROCEDURE — 97112 NEUROMUSCULAR REEDUCATION: CPT

## 2024-04-16 PROCEDURE — 97110 THERAPEUTIC EXERCISES: CPT

## 2024-04-16 NOTE — PROGRESS NOTES
patient's PLOF.  Eval: 8/10 pain at worst, but an average daily pain of 8/10    Current:     Patient will demonstrate 5/5 strength of bilateral LE's in order to be able to safely perform functional activities and demonstrate improved stability and strength.  Eval: MMT not assessed at this time due to limited AROM, will test when appropriate  Current: addressing LE/core strength via standing and seated exercises   4/8/24     Patient will be able to safely ambulate community distances without AD and functional gait mechanics in order to improve overall mobility and return patient to his or her PLOF.  Eval: with FWW: forward flexed trunk, decreased left LE weight bearing during stance phase contributing to shorter right LE stride length, decreased left hip extension, WBOS, and slow gait speed, household distances  Current: pt able to walk around home and to work at this time with RW with continued limitations on distance 4/16/24     Patient will be able to safely negotiate a full flight of stairs with a step-through pattern while holding onto at least one handrail in order to return to normal with this functional activity and improve safety on stairs.  Eval: step-to pattern with difficulty     Patient will be able to perform at least 12 reps of sit <> stands with good form/control during 30\" STS test to demonstrate improved LE strength and stability during this functional activity, thus reducing the patients risk of falls.  Eval: 30\" STS: 3 reps, with use of hands to push from chair (18.5\" tall) and noted difficulty   Current:     PLAN  Yes  Continue plan of care  []  Upgrade activities as tolerated  []  Discharge due to :  []  Other:    Pillo Engel PTA    4/16/2024    8:05 AM    Future Appointments   Date Time Provider Department Center   4/16/2024  8:30 AM Pillo Engel PTA [facility]   4/19/2024  8:30 AM Pillo Engel PTA [facility]   4/22/2024  7:50 AM Sissy Lobato, PT [facility]   4/24/2024

## 2024-04-18 ENCOUNTER — APPOINTMENT (OUTPATIENT)
Facility: HOSPITAL | Age: 54
End: 2024-04-18
Payer: OTHER GOVERNMENT

## 2024-04-19 ENCOUNTER — HOSPITAL ENCOUNTER (OUTPATIENT)
Facility: HOSPITAL | Age: 54
Setting detail: RECURRING SERIES
Discharge: HOME | End: 2024-04-22
Payer: OTHER GOVERNMENT

## 2024-04-19 PROCEDURE — 97110 THERAPEUTIC EXERCISES: CPT

## 2024-04-19 PROCEDURE — 97112 NEUROMUSCULAR REEDUCATION: CPT

## 2024-04-19 PROCEDURE — 97116 GAIT TRAINING THERAPY: CPT

## 2024-04-19 PROCEDURE — 97530 THERAPEUTIC ACTIVITIES: CPT

## 2024-04-19 NOTE — PROGRESS NOTES
PHYSICAL / OCCUPATIONAL THERAPY - DAILY TREATMENT NOTE     Patient Name: Taniya Valdez    Date: 2024    : 1970  Insurance: Payor:  EAST / Plan:  EAST / Product Type: *No Product type* /      Patient  verified Yes     Visit #   Current / Total 7 24   Time   In / Out 8:32 9:26   Pain   In / Out 3/10 4/10   Subjective Functional Status/Changes: \"I have some pain today. I actually slept through the night for the first time.\"   Changes to:  Allergies, Med Hx, Sx Hx?   no       TREATMENT AREA =  Pain in left hip [M25.552]    OBJECTIVE    Therapeutic Procedures:  Tx Min Billable or 1:1 Min (if diff from Tx Min) Procedure, Rationale, Specifics   20  05880 Therapeutic Exercise (timed):  increase ROM, strength, coordination, balance, and proprioception to improve patient's ability to progress to PLOF and address remaining functional goals. (see flow sheet as applicable)    Details if applicable:       12  46587 Neuromuscular Re-Education (timed):  improve balance, coordination, kinesthetic sense, posture, core stability and proprioception to improve patient's ability to develop conscious control of individual muscles and awareness of position of extremities in order to progress to PLOF and address remaining functional goals. (see flow sheet as applicable)    Details if applicable:     14  80277 Therapeutic Activity (timed):  use of dynamic activities replicating functional movements to increase ROM, strength, coordination, balance, and proprioception in order to improve patient's ability to progress to PLOF and address remaining functional goals.  (see flow sheet as applicable)     Details if applicable:     8  54543 Self Care/Home Management (timed):  improve patient knowledge and understanding of pain reducing techniques, positioning, and posture/ergonomics  to improve patient's ability to progress to PLOF and address remaining functional goals.  (see flow sheet as applicable)    Details if

## 2024-04-22 ENCOUNTER — HOSPITAL ENCOUNTER (OUTPATIENT)
Facility: HOSPITAL | Age: 54
Setting detail: RECURRING SERIES
Discharge: HOME | End: 2024-04-25
Payer: OTHER GOVERNMENT

## 2024-04-22 PROCEDURE — 97110 THERAPEUTIC EXERCISES: CPT

## 2024-04-22 PROCEDURE — 97530 THERAPEUTIC ACTIVITIES: CPT

## 2024-04-22 PROCEDURE — 97112 NEUROMUSCULAR REEDUCATION: CPT

## 2024-04-22 NOTE — PROGRESS NOTES
promote patient satisfaction with overall outcome.  (FOTO is an established functional score where 100 = no disability)  Eval: 21     Patient will report an average daily pain of 2/10 or less during all functional activities in order to improve QOL and return to patient's PLOF.  Eval: 8/10 pain at worst, but an average daily pain of 8/10              Current: 3/10 average daily pain   4/22/24, PROGRESSING     Patient will demonstrate 5/5 strength of bilateral LE's in order to be able to safely perform functional activities and demonstrate improved stability and strength.  Eval: MMT not assessed at this time due to limited AROM, will test when appropriate  Current: addressing LE/core strength via standing and seated exercises   4/8/24     Patient will be able to safely ambulate community distances without AD and functional gait mechanics in order to improve overall mobility and return patient to his or her PLOF.  Eval: with FWW: forward flexed trunk, decreased left LE weight bearing during stance phase contributing to shorter right LE stride length, decreased left hip extension, WBOS, and slow gait speed, household distances  Current: Patient is able to ambulate community distances with Lofstrand crutch.  Gait mechanics are improving, though patient is still with decreased left LE weight shifting contributing to decreased left LE stance time, decreased left hip extension, WBOS, slow gait speed, and antalgia.  4/22/24, PROGRESSING     Patient will be able to safely negotiate a full flight of stairs with a step-through pattern while holding onto at least one handrail in order to return to normal with this functional activity and improve safety on stairs.  Eval: step-to pattern with difficulty     Patient will be able to perform at least 12 reps of sit <> stands with good form/control during 30\" STS test to demonstrate improved LE strength and stability during this functional activity, thus reducing the patients risk of

## 2024-04-24 ENCOUNTER — HOSPITAL ENCOUNTER (OUTPATIENT)
Facility: HOSPITAL | Age: 54
Setting detail: RECURRING SERIES
Discharge: HOME | End: 2024-04-27
Payer: OTHER GOVERNMENT

## 2024-04-24 PROCEDURE — 97110 THERAPEUTIC EXERCISES: CPT

## 2024-04-24 PROCEDURE — 97530 THERAPEUTIC ACTIVITIES: CPT

## 2024-04-24 PROCEDURE — 97112 NEUROMUSCULAR REEDUCATION: CPT

## 2024-04-24 NOTE — PROGRESS NOTES
[facility] at [facility]  [address] [place] [address]  Ph [phone]  Fx [phone]    Physical Therapy Progress Note  Patient name: Taniya Valdez Start of Care: 3/29/2024   Referral source: Garrett Oliver MD : 1970               Medical Diagnosis: Pain in left hip [M25.552]    Onset Date:3/27/24               Treatment Diagnosis: M25.552  LEFT HIP PAIN      Prior Hospitalization: see medical history Provider#: [id number]   Medications: Verified on Patient summary List   Comorbidities: s/p left THR with posterior approach 3/27/24, s/p right THR with posterior approach 2022, OA, morbid obesity, hysterectomy, 2 vaginal deliveries  Substance Use: [] tobacco use, [] alcohol use, [] other:   Patients Self-Rated Overall Health Status: [] poor, [] fair, [x] good, [] excellent  Number of Falls Within the Past Year: [x] none, []   Prior Level of Function:   [x] Unrestricted with functional activities and ADL's  [x] No assistive device  [] active lifestyle, [x] moderately active lifestyle, [] sedentary lifestyle  Patients Goals:  \"I hope to be functional with everything and painfree.\"    Visits from Start of Care: 9    Missed Visits: 0    Updated Goals/Measure of Progress: To be achieved in 15 treatments:    Pt has attended 9 PT visits thus far with good progress toward goals however still demonstrates impaired strength, gait mechanics and stair negotiation and would benefit from continued skilled PT intervention to address these deficits. Patient tolerated treatment session well today. Patient had no complaints with addition of increased resistance to row/ext to exercise program to accomplish improved core stability and strength.   Patient continues to make good progress toward goals and would benefit from continued skilled PT intervention to address remaining deficits outlined in goals below.        Patient will continue to benefit from skilled PT / OT services to modify and

## 2024-04-24 NOTE — PROGRESS NOTES
PHYSICAL / OCCUPATIONAL THERAPY - DAILY TREATMENT NOTE     Patient Name: Taniya Valdez    Date: 2024    : 1970  Insurance: Payor:  EAST / Plan:  EAST / Product Type: *No Product type* /      Patient  verified Yes     Visit #   Current / Total 9 24   Time   In / Out 0710 0805   Pain   In / Out 3/10 4/10   Subjective Functional Status/Changes: Pt pleasant, reports no new changes.   Changes to:  Allergies, Med Hx, Sx Hx?   no       TREATMENT AREA =  Pain in left hip [M25.552]    OBJECTIVE    Therapeutic Procedures:  Tx Min Billable or 1:1 Min (if diff from Tx Min) Procedure, Rationale, Specifics   15  12946 Therapeutic Exercise (timed):  increase ROM, strength, coordination, balance, and proprioception to improve patient's ability to progress to PLOF and address remaining functional goals. (see flow sheet as applicable)    Details if applicable:       20  27815 Neuromuscular Re-Education (timed):  improve balance, coordination, kinesthetic sense, posture, core stability and proprioception to improve patient's ability to develop conscious control of individual muscles and awareness of position of extremities in order to progress to PLOF and address remaining functional goals. (see flow sheet as applicable)    Details if applicable:     20  67416 Therapeutic Activity (timed):  use of dynamic activities replicating functional movements to increase ROM, strength, coordination, balance, and proprioception in order to improve patient's ability to progress to PLOF and address remaining functional goals.  (see flow sheet as applicable)     Details if applicable:           Details if applicable:            Details if applicable:     55  [facility] Totals Reminder: bill using total billable min of TIMED therapeutic procedures (example: do not include dry needle or estim unattended, both untimed codes, in totals to left)  8-22 min = 1 unit; 23-37 min = 2 units; 38-52 min = 3 units; 53-67 min = 4 units;

## 2024-04-29 ENCOUNTER — HOSPITAL ENCOUNTER (OUTPATIENT)
Facility: HOSPITAL | Age: 54
Setting detail: RECURRING SERIES
Discharge: HOME | End: 2024-05-02
Payer: OTHER GOVERNMENT

## 2024-04-29 PROCEDURE — 97112 NEUROMUSCULAR REEDUCATION: CPT

## 2024-04-29 PROCEDURE — 97110 THERAPEUTIC EXERCISES: CPT

## 2024-04-29 PROCEDURE — 97535 SELF CARE MNGMENT TRAINING: CPT

## 2024-04-29 PROCEDURE — 97530 THERAPEUTIC ACTIVITIES: CPT

## 2024-04-29 NOTE — PROGRESS NOTES
PHYSICAL / OCCUPATIONAL THERAPY - DAILY TREATMENT NOTE     Patient Name: Taniya Valdez    Date: 2024    : 1970  Insurance: Payor:  EAST / Plan:  EAST / Product Type: *No Product type* /      Patient  verified Yes     Visit #   Current / Total 10 24   Time   In / Out 4:30 5:23   Pain   In / Out 3/10 3/10   Subjective Functional Status/Changes: \"I have some pain in my Left Hip.\"   Changes to:  Allergies, Med Hx, Sx Hx?   no       TREATMENT AREA =  Pain in left hip [M25.552]    OBJECTIVE    Therapeutic Procedures:  Tx Min Billable or 1:1 Min (if diff from Tx Min) Procedure, Rationale, Specifics   23  25764 Therapeutic Exercise (timed):  increase ROM, strength, coordination, balance, and proprioception to improve patient's ability to progress to PLOF and address remaining functional goals. (see flow sheet as applicable)    Details if applicable:       10  04478 Neuromuscular Re-Education (timed):  improve balance, coordination, kinesthetic sense, posture, core stability and proprioception to improve patient's ability to develop conscious control of individual muscles and awareness of position of extremities in order to progress to PLOF and address remaining functional goals. (see flow sheet as applicable)    Details if applicable:     12  97823 Therapeutic Activity (timed):  use of dynamic activities replicating functional movements to increase ROM, strength, coordination, balance, and proprioception in order to improve patient's ability to progress to PLOF and address remaining functional goals.  (see flow sheet as applicable)     Details if applicable:     8  68333 Self Care/Home Management (timed):  improve patient knowledge and understanding of pain reducing techniques, positioning, and posture/ergonomics  to improve patient's ability to progress to PLOF and address remaining functional goals.  (see flow sheet as applicable)    Details if applicable:            Details if applicable:

## 2024-05-01 ENCOUNTER — HOSPITAL ENCOUNTER (OUTPATIENT)
Facility: HOSPITAL | Age: 54
Setting detail: RECURRING SERIES
Discharge: HOME | End: 2024-05-04
Payer: OTHER GOVERNMENT

## 2024-05-01 PROCEDURE — 97110 THERAPEUTIC EXERCISES: CPT

## 2024-05-01 PROCEDURE — 97530 THERAPEUTIC ACTIVITIES: CPT

## 2024-05-01 NOTE — PROGRESS NOTES
PHYSICAL / OCCUPATIONAL THERAPY - DAILY TREATMENT NOTE     Patient Name: Taniya Valdez    Date: 2024    : 1970  Insurance: Payor: /      Patient  verified Yes     Visit #   Current / Total 11 24   Time   In / Out 4:30 5:12   Pain   In / Out 3/10 3/10   Subjective Functional Status/Changes: \"I have some pain in my hip today.\"   Changes to:  Allergies, Med Hx, Sx Hx?   no       TREATMENT AREA =  Pain in left hip [M25.552]    OBJECTIVE  Therapeutic Procedures:  Tx Min Billable or 1:1 Min (if diff from Tx Min) Procedure, Rationale, Specifics   30  92189 Therapeutic Exercise (timed):  increase ROM, strength, coordination, balance, and proprioception to improve patient's ability to progress to PLOF and address remaining functional goals. (see flow sheet as applicable)    Details if applicable:         35827 Neuromuscular Re-Education (timed):  improve balance, coordination, kinesthetic sense, posture, core stability and proprioception to improve patient's ability to develop conscious control of individual muscles and awareness of position of extremities in order to progress to PLOF and address remaining functional goals. (see flow sheet as applicable)    Details if applicable:     12  79137 Therapeutic Activity (timed):  use of dynamic activities replicating functional movements to increase ROM, strength, coordination, balance, and proprioception in order to improve patient's ability to progress to PLOF and address remaining functional goals.  (see flow sheet as applicable)     Details if applicable:       20726 Self Care/Home Management (timed):  improve patient knowledge and understanding of pain reducing techniques, positioning, and posture/ergonomics  to improve patient's ability to progress to PLOF and address remaining functional goals.  (see flow sheet as applicable)    Details if applicable:            Details if applicable:     42  [facility] Totals Reminder: bill using total billable min of TIMED

## 2024-05-06 ENCOUNTER — HOSPITAL ENCOUNTER (OUTPATIENT)
Facility: HOSPITAL | Age: 54
Setting detail: RECURRING SERIES
Discharge: HOME | End: 2024-05-09
Payer: OTHER GOVERNMENT

## 2024-05-06 PROCEDURE — 97112 NEUROMUSCULAR REEDUCATION: CPT

## 2024-05-06 PROCEDURE — 97530 THERAPEUTIC ACTIVITIES: CPT

## 2024-05-06 PROCEDURE — 97110 THERAPEUTIC EXERCISES: CPT

## 2024-05-08 ENCOUNTER — APPOINTMENT (OUTPATIENT)
Facility: HOSPITAL | Age: 54
End: 2024-05-08
Payer: OTHER GOVERNMENT

## 2024-05-09 ENCOUNTER — HOSPITAL ENCOUNTER (OUTPATIENT)
Facility: HOSPITAL | Age: 54
Setting detail: RECURRING SERIES
Discharge: HOME | End: 2024-05-12
Payer: OTHER GOVERNMENT

## 2024-05-09 PROCEDURE — 97530 THERAPEUTIC ACTIVITIES: CPT

## 2024-05-09 PROCEDURE — 97110 THERAPEUTIC EXERCISES: CPT

## 2024-05-09 PROCEDURE — 97112 NEUROMUSCULAR REEDUCATION: CPT
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
## 2024-05-13 ENCOUNTER — HOSPITAL ENCOUNTER (OUTPATIENT)
Facility: HOSPITAL | Age: 54
Setting detail: RECURRING SERIES
Discharge: HOME | End: 2024-05-16
Payer: OTHER GOVERNMENT

## 2024-05-13 PROCEDURE — 97116 GAIT TRAINING THERAPY: CPT

## 2024-05-13 PROCEDURE — 97530 THERAPEUTIC ACTIVITIES: CPT

## 2024-05-13 PROCEDURE — 97112 NEUROMUSCULAR REEDUCATION: CPT

## 2024-05-13 PROCEDURE — 97110 THERAPEUTIC EXERCISES: CPT

## 2024-05-13 NOTE — PROGRESS NOTES
PHYSICAL / OCCUPATIONAL THERAPY - DAILY TREATMENT NOTE     Patient Name: Taniya Valdez    Date: 2024    : 1970  Insurance: Payor:  EAST / Plan:  EAST PRIME / Product Type: *No Product type* /      Patient  verified Yes     Visit #   Current / Total 14 24   Time   In / Out 745 830   Pain   In / Out 2/10 2/10   Subjective Functional Status/Changes: Pt reported that she was feeling good today better than last time.   She returns to ortho tomorrow.    Changes to:  Allergies, Med Hx, Sx Hx?   no       TREATMENT AREA =  Pain in left hip [M25.552]    OBJECTIVE    Therapeutic Procedures:  Tx Min Billable or 1:1 Min (if diff from Tx Min) Procedure, Rationale, Specifics   15 15 12526 Therapeutic Exercise (timed):  increase ROM, strength, coordination, balance, and proprioception to improve patient's ability to progress to PLOF and address remaining functional goals. (see flow sheet as applicable)    Details if applicable:       12 12 58253 Neuromuscular Re-Education (timed):  improve balance, coordination, kinesthetic sense, posture, core stability and proprioception to improve patient's ability to develop conscious control of individual muscles and awareness of position of extremities in order to progress to PLOF and address remaining functional goals. (see flow sheet as applicable)    Details if applicable:     10 10 06018 Therapeutic Activity (timed):  use of dynamic activities replicating functional movements to increase ROM, strength, coordination, balance, and proprioception in order to improve patient's ability to progress to PLOF and address remaining functional goals.  (see flow sheet as applicable)     Details if applicable:     8 8 68090 Gait Training (timed):    300 feet with no AD (assistive device) over even surfaces with Sup level of assist. Cuing for heel toe gait pattern.  To improve safety and dynamic movement with household/community ambulation.  (see flow sheet as

## 2024-05-15 ENCOUNTER — HOSPITAL ENCOUNTER (OUTPATIENT)
Facility: HOSPITAL | Age: 54
Setting detail: RECURRING SERIES
Discharge: HOME | End: 2024-05-18
Payer: OTHER GOVERNMENT

## 2024-05-15 PROCEDURE — 97112 NEUROMUSCULAR REEDUCATION: CPT

## 2024-05-15 PROCEDURE — 97530 THERAPEUTIC ACTIVITIES: CPT

## 2024-05-15 PROCEDURE — 97110 THERAPEUTIC EXERCISES: CPT

## 2024-05-15 NOTE — PROGRESS NOTES
gait pattern but has pain with terminal stance phase initial swing phase in hip flexor muscle.      Patient will be able to safely negotiate a full flight of stairs with a step-through pattern while holding onto at least one handrail in order to return to normal with this functional activity and improve safety on stairs.  Eval: step-to pattern with difficulty  4/24/24 PN: step to pattern with use of HR and right LE bearing weight   Current: Pt is able to perform step through gait mechanics with stair ambulation but, requiring bilateral UE support with moderate antalgia 5/1/24  5/15/24: continues to struggle with eccentric loading of left LE during stairs     Patient will be able to perform at least 12 reps of sit <> stands with good form/control during 30\" STS test to demonstrate improved LE strength and stability during this functional activity, thus reducing the patients risk of falls.  Eval: 30\" STS: 3 reps, with use of hands to push from chair (18.5\" tall) and noted difficulty              4/24/24 PN:  9 reps from 20inch seat with no use of UE PROGRESSING    PLAN  Yes  Continue plan of care  []  Upgrade activities as tolerated  []  Discharge due to :  []  Other:    Ileana Harris PT    5/15/2024    7:38 AM    Future Appointments   Date Time Provider Department Center   5/15/2024  7:50 AM Ileana Harris PT [facility]   5/20/2024  7:50 AM Ileana Harris PT [facility]

## 2024-05-20 ENCOUNTER — HOSPITAL ENCOUNTER (OUTPATIENT)
Facility: HOSPITAL | Age: 54
Setting detail: RECURRING SERIES
Discharge: HOME | End: 2024-05-23
Payer: OTHER GOVERNMENT

## 2024-05-20 PROCEDURE — 97110 THERAPEUTIC EXERCISES: CPT

## 2024-05-20 PROCEDURE — 97112 NEUROMUSCULAR REEDUCATION: CPT

## 2024-05-20 PROCEDURE — 97530 THERAPEUTIC ACTIVITIES: CPT

## 2024-05-20 PROCEDURE — 97116 GAIT TRAINING THERAPY: CPT

## 2024-05-20 NOTE — PROGRESS NOTES
less during all functional activities in order to improve QOL and return to patient's PLOF.  Eval: 8/10 pain at worst, but an average daily pain of 8/10              4/24/24 PN: 3/10 average daily pain; 5/10 at worst in past week   PROGRESSING             PN 5/20/24: 2/10 on average; 3/10 at worst PROGRESSING       Patient will demonstrate 5/5 strength of bilateral LE's in order to be able to safely perform functional activities and demonstrate improved stability and strength.  Eval: MMT not assessed at this time due to limited AROM, will test when appropriate  4/24/24 PN:   Lower Extremity/Lumbar Left  (0-5) Right (0-5)   Hip Flexion (L1,2) 4- 4   Knee Extension (L3,4) 4+ 4+   Ankle Dorsiflexion (L4) 5 5   Ankle Plantarflexion (S1) 5 5   Knee Flexion (S1,2) 4+ 4+   Gluteus [place] Antonio 3 4-   Hip Abduction 3+ 4    PN 5/15/24  Lower Extremity/Lumbar Left  (0-5) Right (0-5)   Hip Flexion (L1,2) 4- 4   Knee Extension (L3,4) 4+ 4+   Ankle Dorsiflexion (L4) 5 5   Ankle Plantarflexion (S1) 5 5   Knee Flexion (S1,2) 4+ 4+   Gluteus [place] 3 4-   Hip Abduction 3+ 4         Patient will be able to safely ambulate community distances without AD and functional gait mechanics in order to improve overall mobility and return patient to his or her PLOF.  Eval: with FWW: forward flexed trunk, decreased left LE weight bearing during stance phase contributing to shorter right LE stride length, decreased left hip extension, WBOS, and slow gait speed, household distances  4/24/24 PN: Patient is able to ambulate community distances with Lofstrand crutch.  Gait mechanics are improving, though patient is still with decreased left LE weight shifting contributing to decreased left LE stance time, decreased left hip extension, WBOS, slow gait speed, and antalgia. PROGRESSING  PN 5/20/24: ambulated with no AD with lateral lean to left during left stance phase 160 feet      Patient will be able to safely negotiate a full flight of stairs with a 
and stability during this functional activity, thus reducing the patients risk of falls.  Eval: 30\" STS: 3 reps, with use of hands to push from chair (18.5\" tall) and noted difficulty  4/24/24 PN: 9 reps from 20\" tall table    MET        Long Term Goals:     to be accomplished within 24  treatments:     Patient will score at least 44 points on FOTO in order to maximize function and promote patient satisfaction with overall outcome.  (FOTO is an established functional score where 100 = no disability)  Eval: 21  4/24/24: 39 progressing well     Patient will report an average daily pain of 2/10 or less during all functional activities in order to improve QOL and return to patient's PLOF.  Eval: 8/10 pain at worst, but an average daily pain of 8/10              4/24/24 PN: 3/10 average daily pain; 5/10 at worst in past week   PROGRESSING             PN 5/20/24: 2/10 on average; 3/10 at worst PROGRESSING       Patient will demonstrate 5/5 strength of bilateral LE's in order to be able to safely perform functional activities and demonstrate improved stability and strength.  Eval: MMT not assessed at this time due to limited AROM, will test when appropriate  4/24/24 PN:   Lower Extremity/Lumbar Left  (0-5) Right (0-5)   Hip Flexion (L1,2) 4- 4   Knee Extension (L3,4) 4+ 4+   Ankle Dorsiflexion (L4) 5 5   Ankle Plantarflexion (S1) 5 5   Knee Flexion (S1,2) 4+ 4+   Gluteus [place] 3 4-   Hip Abduction 3+ 4    PN 5/15/24  Lower Extremity/Lumbar Left  (0-5) Right (0-5)   Hip Flexion (L1,2) 4- 4   Knee Extension (L3,4) 4+ 4+   Ankle Dorsiflexion (L4) 5 5   Ankle Plantarflexion (S1) 5 5   Knee Flexion (S1,2) 4+ 4+   Gluteus [place] 3 4-   Hip Abduction 3+ 4         Patient will be able to safely ambulate community distances without AD and functional gait mechanics in order to improve overall mobility and return patient to his or her PLOF.  Eval: with FWW: forward flexed trunk, decreased left LE weight bearing during stance phase

## 2024-05-23 ENCOUNTER — HOSPITAL ENCOUNTER (OUTPATIENT)
Facility: HOSPITAL | Age: 54
Setting detail: RECURRING SERIES
Discharge: HOME | End: 2024-05-26
Payer: OTHER GOVERNMENT

## 2024-05-23 PROCEDURE — 97110 THERAPEUTIC EXERCISES: CPT

## 2024-05-23 PROCEDURE — 97112 NEUROMUSCULAR REEDUCATION: CPT

## 2024-05-23 PROCEDURE — 97530 THERAPEUTIC ACTIVITIES: CPT

## 2024-05-23 NOTE — PROGRESS NOTES
PHYSICAL / OCCUPATIONAL THERAPY - DAILY TREATMENT NOTE     Patient Name: Taniya Valdez    Date: 2024    : 1970  Insurance: Payor:  EAST / Plan: [email or web address] EAST PRIME / Product Type: *No Product type* /      Patient  verified Yes     Visit #   Current / Total 17 24   Time   In / Out 0748 0830   Pain   In / Out 0 0   Subjective Functional Status/Changes: Pt pleasant, reports no new changes.   Changes to:  Allergies, Med Hx, Sx Hx?   no       TREATMENT AREA =  Pain in left hip [M25.552]    OBJECTIVE      Therapeutic Procedures:  Tx Min Billable or 1:1 Min (if diff from Tx Min) Procedure, Rationale, Specifics   10  71834 Therapeutic Exercise (timed):  increase ROM, strength, coordination, balance, and proprioception to improve patient's ability to progress to PLOF and address remaining functional goals. (see flow sheet as applicable)    Details if applicable:       20  80079 Neuromuscular Re-Education (timed):  improve balance, coordination, kinesthetic sense, posture, core stability and proprioception to improve patient's ability to develop conscious control of individual muscles and awareness of position of extremities in order to progress to PLOF and address remaining functional goals. (see flow sheet as applicable)    Details if applicable:     12  74008 Therapeutic Activity (timed):  use of dynamic activities replicating functional movements to increase ROM, strength, coordination, balance, and proprioception in order to improve patient's ability to progress to PLOF and address remaining functional goals.  (see flow sheet as applicable)     Details if applicable:           Details if applicable:            Details if applicable:     42  [facility] Totals Reminder: bill using total billable min of TIMED therapeutic procedures (example: do not include dry needle or estim unattended, both untimed codes, in totals to left)  8-22 min = 1 unit; 23-37 min = 2 units; 38-52 min = 3 units; 53-67 min = 4

## 2024-05-31 ENCOUNTER — HOSPITAL ENCOUNTER (OUTPATIENT)
Facility: HOSPITAL | Age: 54
Setting detail: RECURRING SERIES
End: 2024-05-31
Payer: OTHER GOVERNMENT

## 2024-05-31 PROCEDURE — 97112 NEUROMUSCULAR REEDUCATION: CPT

## 2024-05-31 PROCEDURE — 97530 THERAPEUTIC ACTIVITIES: CPT

## 2024-05-31 PROCEDURE — 97110 THERAPEUTIC EXERCISES: CPT

## 2024-05-31 NOTE — PROGRESS NOTES
PHYSICAL / OCCUPATIONAL THERAPY - DAILY TREATMENT NOTE     Patient Name: Taniya Valdez    Date: 2024    : 1970  Insurance: Payor:  EAST / Plan:  EAST PRIME / Product Type: *No Product type* /      Patient  verified Yes     Visit #   Current / Total 18 24   Time   In / Out 2:35 3:31   Pain   In / Out 1 0   Subjective Functional Status/Changes: Patient has no complaints.   Changes to:  Allergies, Med Hx, Sx Hx?   no       TREATMENT AREA =  Pain in left hip [M25.552]    OBJECTIVE    Therapeutic Procedures:  Tx Min Billable or 1:1 Min (if diff from Tx Min) Procedure, Rationale, Specifics   9  18685 Therapeutic Exercise (timed):  increase ROM, strength, coordination, balance, and proprioception to improve patient's ability to progress to PLOF and address remaining functional goals. (see flow sheet as applicable)    Details if applicable:       24  06541 Therapeutic Activity (timed):  use of dynamic activities replicating functional movements to increase ROM, strength, coordination, balance, and proprioception in order to improve patient's ability to progress to PLOF and address remaining functional goals.  (see flow sheet as applicable)    Details if applicable:     23  37105 Neuromuscular Re-Education (timed):  improve balance, coordination, kinesthetic sense, posture, core stability and proprioception to improve patient's ability to develop conscious control of individual muscles and awareness of position of extremities in order to progress to PLOF and address remaining functional goals. (see flow sheet as applicable)     Details if applicable:     56  [facility] Totals Reminder: bill using total billable min of TIMED therapeutic procedures (example: do not include dry needle or estim unattended, both untimed codes, in totals to left)  8-22 min = 1 unit; 23-37 min = 2 units; 38-52 min = 3 units; 53-67 min = 4 units; 68-82 min = 5 units   Total Total     TOTAL TREATMENT TIME:        56     [x]  through gait mechanics with stair ambulation but, requiring bilateral UE support with moderate antalgia 5/1/24 5/20/24: continues to struggle with eccentric loading of left LE during stairs     Patient will be able to perform at least 12 reps of sit <> stands with good form/control during 30\" STS test to demonstrate improved LE strength and stability during this functional activity, thus reducing the patients risk of falls.  Eval: 30\" STS: 3 reps, with use of hands to push from chair (18.5\" tall) and noted difficulty              4/24/24 PN:  9 reps from 20inch seat with no use of UE PROGRESSING              5/20/24 PN: 11 reps standard chair PROGRESSING     PLAN  Yes  Continue plan of care  []  Upgrade activities as tolerated  []  Discharge due to :  []  Other:    Sissy Lobato, PT    5/31/2024    3:38 PM    Future Appointments   Date Time Provider Department Center   6/3/2024  7:50 AM Ssisy Lobato, PT [facility]   6/6/2024  7:50 AM Pillo Engel, PTA [facility]   6/17/2024  7:50 AM Sissy Lobato, PT [facility]   6/20/2024  7:50 AM Alannah Singleton, PT [facility]   6/24/2024  7:50 AM Ileana Harris, PT [facility]   6/26/2024  7:50 AM Ileana Harris, PT [facility]

## 2024-06-03 ENCOUNTER — HOSPITAL ENCOUNTER (OUTPATIENT)
Facility: HOSPITAL | Age: 54
Setting detail: RECURRING SERIES
Discharge: HOME | End: 2024-06-06
Payer: OTHER GOVERNMENT

## 2024-06-03 PROCEDURE — 97112 NEUROMUSCULAR REEDUCATION: CPT

## 2024-06-03 PROCEDURE — 97530 THERAPEUTIC ACTIVITIES: CPT

## 2024-06-03 NOTE — PROGRESS NOTES
PHYSICAL / OCCUPATIONAL THERAPY - DAILY TREATMENT NOTE     Patient Name: Taniya Valdez    Date: 6/3/2024    : 1970  Insurance: Payor:  EAST / Plan: [employer] EAST PRIME / Product Type: *No Product type* /      Patient  verified Yes     Visit #   Current / Total 19 24   Time   In / Out 7:50 8:30   Pain   In / Out 1 1   Subjective Functional Status/Changes: Patient reports no difficulty or pain when ambulating community distances, including stating she is now back to doing her own grocery shopping instead of using [employer].   Changes to:  Allergies, Med Hx, Sx Hx?   no       TREATMENT AREA =  Pain in left hip [M25.552]    OBJECTIVE    Therapeutic Procedures:  Tx Min Billable or 1:1 Min (if diff from Tx Min) Procedure, Rationale, Specifics   15  87199 Therapeutic Activity (timed):  use of dynamic activities replicating functional movements to increase ROM, strength, coordination, balance, and proprioception in order to improve patient's ability to progress to PLOF and address remaining functional goals.  (see flow sheet as applicable)    Details if applicable:     25  28175 Neuromuscular Re-Education (timed):  improve balance, coordination, kinesthetic sense, posture, core stability and proprioception to improve patient's ability to develop conscious control of individual muscles and awareness of position of extremities in order to progress to PLOF and address remaining functional goals. (see flow sheet as applicable)     Details if applicable:     40  [facility] Totals Reminder: bill using total billable min of TIMED therapeutic procedures (example: do not include dry needle or estim unattended, both untimed codes, in totals to left)  8-22 min = 1 unit; 23-37 min = 2 units; 38-52 min = 3 units; 53-67 min = 4 units; 68-82 min = 5 units   Total Total     TOTAL TREATMENT TIME:        40     [x]  Patient Education billed concurrently with other procedures   [x] Review HEP    [] Progressed/Changed HEP,

## 2024-06-06 ENCOUNTER — HOSPITAL ENCOUNTER (OUTPATIENT)
Facility: HOSPITAL | Age: 54
Setting detail: RECURRING SERIES
Discharge: HOME | End: 2024-06-09
Payer: OTHER GOVERNMENT

## 2024-06-06 PROCEDURE — 97112 NEUROMUSCULAR REEDUCATION: CPT

## 2024-06-06 PROCEDURE — 97110 THERAPEUTIC EXERCISES: CPT

## 2024-06-06 PROCEDURE — 97530 THERAPEUTIC ACTIVITIES: CPT

## 2024-06-06 NOTE — PROGRESS NOTES
PHYSICAL / OCCUPATIONAL THERAPY - DAILY TREATMENT NOTE     Patient Name: Taniya Valdez    Date: 2024    : 1970  Insurance: Payor: [employer] EAST / Plan: [employer] EAST PRIME / Product Type: *No Product type* /      Patient  verified Yes     Visit #   Current / Total 20 24   Time   In / Out 0745 0830   Pain   In / Out 1 0   Subjective Functional Status/Changes: Pt pleasant, reports she is stiff today.   Changes to:  Allergies, Med Hx, Sx Hx?   no       TREATMENT AREA =  Pain in left hip [M25.552]    OBJECTIVE    Therapeutic Procedures:  Tx Min Billable or 1:1 Min (if diff from Tx Min) Procedure, Rationale, Specifics   10  38406 Therapeutic Exercise (timed):  increase ROM, strength, coordination, balance, and proprioception to improve patient's ability to progress to PLOF and address remaining functional goals. (see flow sheet as applicable)    Details if applicable:       8  22470 Therapeutic Activity (timed):  use of dynamic activities replicating functional movements to increase ROM, strength, coordination, balance, and proprioception in order to improve patient's ability to progress to PLOF and address remaining functional goals.  (see flow sheet as applicable)    Details if applicable:     27  82073 Neuromuscular Re-Education (timed):  improve balance, coordination, kinesthetic sense, posture, core stability and proprioception to improve patient's ability to develop conscious control of individual muscles and awareness of position of extremities in order to progress to PLOF and address remaining functional goals. (see flow sheet as applicable)     Details if applicable:           Details if applicable:            Details if applicable:     45  [facility] Totals Reminder: bill using total billable min of TIMED therapeutic procedures (example: do not include dry needle or estim unattended, both untimed codes, in totals to left)  8-22 min = 1 unit; 23-37 min = 2 units; 38-52 min = 3 units; 53-67 min = 4

## 2024-06-17 ENCOUNTER — APPOINTMENT (OUTPATIENT)
Facility: HOSPITAL | Age: 54
End: 2024-06-17
Payer: OTHER GOVERNMENT

## 2024-06-20 ENCOUNTER — HOSPITAL ENCOUNTER (OUTPATIENT)
Facility: HOSPITAL | Age: 54
Setting detail: RECURRING SERIES
Discharge: HOME | End: 2024-06-23
Payer: OTHER GOVERNMENT

## 2024-06-20 PROCEDURE — 97530 THERAPEUTIC ACTIVITIES: CPT

## 2024-06-20 PROCEDURE — 97535 SELF CARE MNGMENT TRAINING: CPT

## 2024-06-20 PROCEDURE — 97112 NEUROMUSCULAR REEDUCATION: CPT

## 2024-06-20 PROCEDURE — 97110 THERAPEUTIC EXERCISES: CPT

## 2024-06-20 NOTE — PROGRESS NOTES
PHYSICAL / OCCUPATIONAL THERAPY - DAILY TREATMENT NOTE     Patient Name: Taniya Valdez    Date: 2024    : 1970  Insurance: Payor:  EAST / Plan: [employer] EAST PRIME / Product Type: *No Product type* /      Patient  verified Yes     Visit #   Current / Total 21 24   Time   In / Out 7:50 8:35   Pain   In / Out 2/10 2/10   Subjective Functional Status/Changes: \"I have a little pain but, not bad.\"   Changes to:  Allergies, Med Hx, Sx Hx?   no       TREATMENT AREA =  Pain in left hip [M25.552]    OBJECTIVE    Therapeutic Procedures:  Tx Min Billable or 1:1 Min (if diff from Tx Min) Procedure, Rationale, Specifics   17  58302 Therapeutic Exercise (timed):  increase ROM, strength, coordination, balance, and proprioception to improve patient's ability to progress to PLOF and address remaining functional goals. (see flow sheet as applicable)    Details if applicable:       8  24248 Neuromuscular Re-Education (timed):  improve balance, coordination, kinesthetic sense, posture, core stability and proprioception to improve patient's ability to develop conscious control of individual muscles and awareness of position of extremities in order to progress to PLOF and address remaining functional goals. (see flow sheet as applicable)    Details if applicable:  balance and gait mechanics and control with stair ambulation    10  07500 Therapeutic Activity (timed):  use of dynamic activities replicating functional movements to increase ROM, strength, coordination, balance, and proprioception in order to improve patient's ability to progress to PLOF and address remaining functional goals.  (see flow sheet as applicable)     Details if applicable:     10  91758 Self Care/Home Management (timed):  improve patient knowledge and understanding of pain reducing techniques, positioning, and posture/ergonomics  to improve patient's ability to progress to PLOF and address remaining functional goals.  (see flow sheet as

## 2024-06-24 ENCOUNTER — APPOINTMENT (OUTPATIENT)
Facility: HOSPITAL | Age: 54
End: 2024-06-24
Payer: OTHER GOVERNMENT

## 2024-06-24 NOTE — PROGRESS NOTES
[facility]  [address], [place] [address]  Phone [phone]  Fax [phone]    Physical Therapy Discharge Summary    Patient name: Taniya Valdez Start of Care: 3/29/2024   Referral source: Garrett Oliver MD : 1970               Medical Diagnosis: Pain in left hip [M25.552]    Onset Date:3/27/24               Treatment Diagnosis: M25.552  LEFT HIP PAIN      Prior Hospitalization: see medical history Provider#: [id number]   Medications: Verified on Patient summary List   Comorbidities: s/p left THR with posterior approach 3/27/24, s/p right THR with posterior approach 2022, OA, morbid obesity, hysterectomy, 2 vaginal deliveries  Substance Use: [] tobacco use, [] alcohol use, [] other:   Patients Self-Rated Overall Health Status: [] poor, [] fair, [x] good, [] excellent  Number of Falls Within the Past Year: [x] none, []   Prior Level of Function:   [x] Unrestricted with functional activities and ADL's  [x] No assistive device  [] active lifestyle, [x] moderately active lifestyle, [] sedentary lifestyle  Patients Goals:  \"I hope to be functional with everything and painfree.\"    Visits from Start of Care: 21    Missed Visits: 1    Reporting Period : 3/29/24 to 24    Assessment / Summary of Care:  Patient has completed 21 sessions of skilled PT for treatment of left hip pain s/p left THR with posterior approach on 3/27/24.  Patient has made excellent progress during her course of treatment, including now having met all goals with the exception of still having mild to moderate weakness of bilateral glutes.  Patient is able to ambulate community distances without AD and normal gait mechanics, perform normal stair negotations, and is able to sit safely in a normal height chair.  Despite having continued gluteal weakness, patient is independent with her HEP and she feels confident in continuing on her own at this time.  Thank you for the referral to [facility]

## 2024-06-26 ENCOUNTER — APPOINTMENT (OUTPATIENT)
Facility: HOSPITAL | Age: 54
End: 2024-06-26
Payer: OTHER GOVERNMENT

## (undated) DEVICE — PILLOW ABDUCTN DSPB 6X18X25IN -- LG

## (undated) DEVICE — GLOVE SURG SZ 75 L12IN FNGR THK79MIL GRN LTX FREE

## (undated) DEVICE — DRAPE C ARM UNIV W41XL74IN CLR PLAS XR VELC CLSR POLY STRP

## (undated) DEVICE — HOOD, PEEL-AWAY: Brand: FLYTE

## (undated) DEVICE — GARMENT,MEDLINE,DVT,INT,CALF,MED, GEN2: Brand: MEDLINE

## (undated) DEVICE — 3M™ STERI-DRAPE™ U-DRAPE 1015: Brand: STERI-DRAPE™

## (undated) DEVICE — 3M™ IOBAN™ 2 ANTIMICROBIAL INCISE DRAPE 6650EZ: Brand: IOBAN™ 2

## (undated) DEVICE — HANDPIECE SET WITH HIGH FLOW TIP AND SUCTION TUBE: Brand: INTERPULSE

## (undated) DEVICE — HEWSON SUTURE RETRIEVER: Brand: HEWSON SUTURE RETRIEVER

## (undated) DEVICE — SYRINGE, LUER LOCK, 30ML: Brand: MEDLINE

## (undated) DEVICE — GLOVE ORANGE PI 7 1/2   MSG9075

## (undated) DEVICE — NEEDLE,HYPODERM,SAFETY,21GX1.5": Brand: MEDLINE

## (undated) DEVICE — PEEL-AWAY TOGA, X-LARGE: Brand: FLYTE

## (undated) DEVICE — SUT VCRL + 2-0 27IN SH UD --

## (undated) DEVICE — PACK PROCEDURE SURG TOT HIP CUST

## (undated) DEVICE — SOLUTION IRRIG 3000ML 0.9% SOD CHL USP UROMATIC PLAS CONT

## (undated) DEVICE — SUTURE VCRL + SZ 0 L18IN ABSRB UD L36MM CT-1 1/2 CIR VCP840D

## (undated) DEVICE — STRYKER PERFORMANCE SERIES SAGITTAL BLADE: Brand: STRYKER PERFORMANCE SERIES

## (undated) DEVICE — MAYO STAND COVER: Brand: CONVERTORS

## (undated) DEVICE — BLADE SAW 1.19X20X90 MM FOR LG BNE

## (undated) DEVICE — CONTAINER,SPECIMEN,OR STERILE,4OZ: Brand: MEDLINE

## (undated) DEVICE — BLADE ELECTRODE: Brand: EDGE

## (undated) DEVICE — 3M™ IOBAN™ 2 ANTIMICROBIAL INCISE DRAPE 6651EZ: Brand: IOBAN™ 2

## (undated) DEVICE — SUTURE STRATAFIX SYMMETRIC PDS + SZ 1 L18IN ABSRB VLT L48MM SXPP1A400

## (undated) DEVICE — SPONGE GZ W4XL4IN COT 12 PLY TYP VII WVN C FLD DSGN

## (undated) DEVICE — Device

## (undated) DEVICE — INTENT TO BE USED WITH SUTURE MATERIAL FOR TISSUE CLOSURE: Brand: RICHARD-ALLAN® NEEDLE 1/2 CIRCLE TAPER

## (undated) DEVICE — SUTURE VCRL + SZ 3-0 L18IN ABSRB UD SH 1/2 CIR TAPERCUT NDL VCP864D

## (undated) DEVICE — SPONGE GZ W4XL4IN COT 12 PLY TYP VII WVN C FLD DSGN STERILE

## (undated) DEVICE — DRESSING,GAUZE,XEROFORM,CURAD,1"X8",ST: Brand: CURAD

## (undated) DEVICE — TUBING, SUCTION, 1/4" X 12', STRAIGHT: Brand: MEDLINE

## (undated) DEVICE — SOL IRR SOD CL 0.9% 3000ML BG --

## (undated) DEVICE — COVER LT HNDL PLAS RIG 2 PER PK

## (undated) DEVICE — SUTURE TICRON SZ 2 L30IN NONABSORBABLE BLU HGS-21 1/2 CIR 8886311381

## (undated) DEVICE — SUTURE VCRL + SZ 2-0 L36IN ABSRB UD L36MM CT-1 1/2 CIR VCP945H

## (undated) DEVICE — TOWEL,OR,DSP,ST,BLUE,STD,4/PK,20PK/CS: Brand: MEDLINE

## (undated) DEVICE — SUTURE ETHBND EXCEL SZ 5 L30IN NONABSORBABLE GRN L48MM CCS MB47G

## (undated) DEVICE — SYR 50ML LR LCK 1ML GRAD NSAF --

## (undated) DEVICE — PAD,ABDOMINAL,5"X9",ST,LF,25/BX: Brand: MEDLINE INDUSTRIES, INC.

## (undated) DEVICE — SUT VCRL + 0 36IN CT1 UD --